# Patient Record
Sex: MALE | Race: WHITE | Employment: FULL TIME | URBAN - METROPOLITAN AREA
[De-identification: names, ages, dates, MRNs, and addresses within clinical notes are randomized per-mention and may not be internally consistent; named-entity substitution may affect disease eponyms.]

---

## 2017-02-02 ENCOUNTER — OFFICE VISIT (OUTPATIENT)
Dept: FAMILY MEDICINE | Facility: CLINIC | Age: 67
End: 2017-02-02
Payer: COMMERCIAL

## 2017-02-02 ENCOUNTER — HOSPITAL ENCOUNTER (OUTPATIENT)
Dept: GENERAL RADIOLOGY | Facility: CLINIC | Age: 67
Discharge: HOME | End: 2017-02-02
Attending: NURSE PRACTITIONER | Admitting: NURSE PRACTITIONER
Payer: COMMERCIAL

## 2017-02-02 VITALS
HEIGHT: 71 IN | WEIGHT: 181 LBS | HEART RATE: 80 BPM | SYSTOLIC BLOOD PRESSURE: 130 MMHG | BODY MASS INDEX: 25.34 KG/M2 | TEMPERATURE: 97.2 F | DIASTOLIC BLOOD PRESSURE: 76 MMHG

## 2017-02-02 DIAGNOSIS — S90.31XA CONTUSION OF FOOT, RIGHT: ICD-10-CM

## 2017-02-02 DIAGNOSIS — S99.921A FOOT INJURY, RIGHT, INITIAL ENCOUNTER: Primary | ICD-10-CM

## 2017-02-02 DIAGNOSIS — S99.921A FOOT INJURY, RIGHT, INITIAL ENCOUNTER: ICD-10-CM

## 2017-02-02 PROCEDURE — 73630 X-RAY EXAM OF FOOT: CPT | Mod: TC

## 2017-02-02 PROCEDURE — 99213 OFFICE O/P EST LOW 20 MIN: CPT | Performed by: NURSE PRACTITIONER

## 2017-02-02 NOTE — PROGRESS NOTES
"  SUBJECTIVE:                                                    Dion López is a 66 year old male who presents to clinic today for the following health issues:      Joint Pain     Onset: 1 week ago     Description:   Location: right foot  Character: Dull ache    Intensity: moderate    Progression of Symptoms: same    Accompanying Signs & Symptoms:  Other symptoms: radiation of pain to right knee, swelling and discoloration of foot, up leg   History:   Previous similar pain: no       Precipitating factors:   Trauma or overuse: YES- stubbed foot at home walking into a wall    Alleviating factors:  Improved : soaking       Therapies Tried and outcome: soaking      The patient is a 66-year-old male seen in clinic today with an injury to the right foot. Last week he got up during the middle of the night and stubbed the toes of the right foot into the wall. He states he jammed it very hard, actually felt pain radiate all the way up the front of his leg. He now has a sense of numbness and tightness in the calf of the leg, and numbness over the dorsum of the foot into the toes. Minimal pain.    Problem list and histories reviewed & adjusted, as indicated.  Additional history: as documented    BP Readings from Last 3 Encounters:   02/02/17 130/76   10/14/16 114/72   04/07/16 128/80    Wt Readings from Last 3 Encounters:   02/02/17 181 lb (82.101 kg)   10/14/16 188 lb (85.276 kg)   04/07/16 184 lb (83.462 kg)                    ROS:  Constitutional, HEENT, cardiovascular, pulmonary, gi and gu systems are negative, except as otherwise noted.    OBJECTIVE:                                                    /76 mmHg  Pulse 80  Temp(Src) 97.2  F (36.2  C) (Tympanic)  Ht 5' 11\" (1.803 m)  Wt 181 lb (82.101 kg)  BMI 25.26 kg/m2  Body mass index is 25.26 kg/(m^2).  GENERAL: healthy, alert and no distress  MS: Focused exam of right lower extremity: There is no edema. Pedal and posttibial pulses are easily palpated. " Diffuse ecchymosis across the second and third toes, the dorsum of the forefoot, the lateral aspect of the foot, and around the lateral malleolus. He is able to move all toes, there is still mild swelling of the second and third toes. His first toe is deformed appearing area and states this is due to an injury as a young child when it was severed and sewed back on. He has decreased sensation to tactile stimuli across the dorsum of the foot and around the ankle. No pain to palpation of the calf, no redness or obvious swelling.    X-ray appears negative for acute bony injury. Formal reading pending      ASSESSMENT/PLAN:                                                      Problem List Items Addressed This Visit        Medium    Foot injury, right, initial encounter - Primary    Relevant Orders    XR Foot Right G/E 3 Views      Other Visit Diagnoses     Contusion of foot, right              Warm water soaks for 10-15 minutes twice daily to improve circulation, help reabsorb the ecchymosis.    Avoid prolonged standing or walking for the next 2-3 days    Follow-up in clinic if still experiencing significant numbness in the foot    BP Screening:   Last 3 BP Readings:    BP Readings from Last 3 Encounters:   02/02/17 130/76   10/14/16 114/72   04/07/16 128/80       The following was recommended to the patient:  Re-screen BP within a year and recommended lifestyle modifications          DONNA Lezama [facility]

## 2017-02-02 NOTE — Clinical Note
[address] [address]  Phone: [phone]  Fax: [phone]    February 2, 2017        Dion López  [address] [address]          To whom it may concern:    RE: Dion López    Patient was seen and treated today at our clinic.  Patient may return to work 2/6/17 with the following:  No restrictions    Please contact me for questions or concerns.      Sincerely,        DONNA Lezama CNP

## 2017-02-02 NOTE — NURSING NOTE
"Chief Complaint   Patient presents with     Musculoskeletal Problem       Initial /76 mmHg  Pulse 80  Temp(Src) 97.2  F (36.2  C) (Tympanic)  Ht 5' 11\" (1.803 m)  Wt 181 lb (82.101 kg)  BMI 25.26 kg/m2 Estimated body mass index is 25.26 kg/(m^2) as calculated from the following:    Height as of this encounter: 5' 11\" (1.803 m).    Weight as of this encounter: 181 lb (82.101 kg).  BP completed using cuff size: regular  "

## 2017-04-13 ENCOUNTER — DOCUMENTATION ONLY (OUTPATIENT)
Dept: VASCULAR SURGERY | Facility: CLINIC | Age: 67
End: 2017-04-13

## 2017-04-13 DIAGNOSIS — Z13.6 ENCOUNTER FOR ABDOMINAL AORTIC ANEURYSM (AAA) SCREENING: Primary | ICD-10-CM

## 2017-09-01 ENCOUNTER — HOSPITAL ENCOUNTER (EMERGENCY)
Facility: CLINIC | Age: 67
Discharge: HOME | End: 2017-09-01
Attending: NURSE PRACTITIONER | Admitting: NURSE PRACTITIONER
Payer: COMMERCIAL

## 2017-09-01 ENCOUNTER — APPOINTMENT (OUTPATIENT)
Dept: GENERAL RADIOLOGY | Facility: CLINIC | Age: 67
End: 2017-09-01
Attending: NURSE PRACTITIONER
Payer: COMMERCIAL

## 2017-09-01 ENCOUNTER — TELEPHONE (OUTPATIENT)
Dept: FAMILY MEDICINE | Facility: CLINIC | Age: 67
End: 2017-09-01

## 2017-09-01 VITALS
BODY MASS INDEX: 25.1 KG/M2 | OXYGEN SATURATION: 96 % | HEART RATE: 63 BPM | TEMPERATURE: 97.7 F | SYSTOLIC BLOOD PRESSURE: 135 MMHG | DIASTOLIC BLOOD PRESSURE: 82 MMHG | RESPIRATION RATE: 14 BRPM | WEIGHT: 180 LBS

## 2017-09-01 DIAGNOSIS — R06.02 SHORTNESS OF BREATH: ICD-10-CM

## 2017-09-01 DIAGNOSIS — F41.9 ANXIETY: ICD-10-CM

## 2017-09-01 DIAGNOSIS — R07.9 CHEST PAIN, UNSPECIFIED: ICD-10-CM

## 2017-09-01 LAB
ALBUMIN SERPL-MCNC: 3.6 G/DL (ref 3.4–5)
ALP SERPL-CCNC: 104 U/L (ref 40–150)
ALT SERPL W P-5'-P-CCNC: 44 U/L (ref 0–70)
ANION GAP SERPL CALCULATED.3IONS-SCNC: 13 MMOL/L (ref 3–14)
AST SERPL W P-5'-P-CCNC: 32 U/L (ref 0–45)
BASOPHILS # BLD AUTO: 0 10E9/L (ref 0–0.2)
BASOPHILS NFR BLD AUTO: 0.4 %
BILIRUB SERPL-MCNC: 0.5 MG/DL (ref 0.2–1.3)
BUN SERPL-MCNC: 27 MG/DL (ref 7–30)
CALCIUM SERPL-MCNC: 8.5 MG/DL (ref 8.5–10.1)
CHLORIDE SERPL-SCNC: 107 MMOL/L (ref 94–109)
CO2 SERPL-SCNC: 24 MMOL/L (ref 20–32)
CREAT SERPL-MCNC: 1.05 MG/DL (ref 0.66–1.25)
DIFFERENTIAL METHOD BLD: NORMAL
EOSINOPHIL # BLD AUTO: 0.2 10E9/L (ref 0–0.7)
EOSINOPHIL NFR BLD AUTO: 2.3 %
ERYTHROCYTE [DISTWIDTH] IN BLOOD BY AUTOMATED COUNT: 13.4 % (ref 10–15)
GFR SERPL CREATININE-BSD FRML MDRD: 70 ML/MIN/1.7M2
GLUCOSE SERPL-MCNC: 106 MG/DL (ref 70–99)
HCT VFR BLD AUTO: 40.8 % (ref 40–53)
HGB BLD-MCNC: 13.9 G/DL (ref 13.3–17.7)
IMM GRANULOCYTES # BLD: 0 10E9/L (ref 0–0.4)
IMM GRANULOCYTES NFR BLD: 0.1 %
LYMPHOCYTES # BLD AUTO: 2.1 10E9/L (ref 0.8–5.3)
LYMPHOCYTES NFR BLD AUTO: 30 %
MCH RBC QN AUTO: 30.5 PG (ref 26.5–33)
MCHC RBC AUTO-ENTMCNC: 34.1 G/DL (ref 31.5–36.5)
MCV RBC AUTO: 90 FL (ref 78–100)
MONOCYTES # BLD AUTO: 0.6 10E9/L (ref 0–1.3)
MONOCYTES NFR BLD AUTO: 8.2 %
NEUTROPHILS # BLD AUTO: 4.2 10E9/L (ref 1.6–8.3)
NEUTROPHILS NFR BLD AUTO: 59 %
PLATELET # BLD AUTO: 227 10E9/L (ref 150–450)
POTASSIUM SERPL-SCNC: 3.8 MMOL/L (ref 3.4–5.3)
PROT SERPL-MCNC: 6.7 G/DL (ref 6.8–8.8)
RBC # BLD AUTO: 4.55 10E12/L (ref 4.4–5.9)
SODIUM SERPL-SCNC: 144 MMOL/L (ref 133–144)
TROPONIN I SERPL-MCNC: <0.015 UG/L (ref 0–0.04)
TSH SERPL DL<=0.005 MIU/L-ACNC: 1.7 MU/L (ref 0.4–4)
WBC # BLD AUTO: 7 10E9/L (ref 4–11)

## 2017-09-01 PROCEDURE — 84443 ASSAY THYROID STIM HORMONE: CPT | Performed by: NURSE PRACTITIONER

## 2017-09-01 PROCEDURE — 85025 COMPLETE CBC W/AUTO DIFF WBC: CPT | Performed by: NURSE PRACTITIONER

## 2017-09-01 PROCEDURE — 71020 XR CHEST 2 VW: CPT | Mod: TC

## 2017-09-01 PROCEDURE — 93005 ELECTROCARDIOGRAM TRACING: CPT | Performed by: NURSE PRACTITIONER

## 2017-09-01 PROCEDURE — 99285 EMERGENCY DEPT VISIT HI MDM: CPT | Mod: 25 | Performed by: NURSE PRACTITIONER

## 2017-09-01 PROCEDURE — 93010 ELECTROCARDIOGRAM REPORT: CPT | Mod: Z6 | Performed by: NURSE PRACTITIONER

## 2017-09-01 PROCEDURE — 84484 ASSAY OF TROPONIN QUANT: CPT | Performed by: NURSE PRACTITIONER

## 2017-09-01 PROCEDURE — 80053 COMPREHEN METABOLIC PANEL: CPT | Performed by: NURSE PRACTITIONER

## 2017-09-01 RX ORDER — LIDOCAINE 40 MG/G
CREAM TOPICAL
Status: DISCONTINUED | OUTPATIENT
Start: 2017-09-01 | End: 2017-09-01 | Stop reason: HOSPADM

## 2017-09-01 RX ORDER — ASPIRIN 81 MG/1
324 TABLET, CHEWABLE ORAL ONCE
Status: DISCONTINUED | OUTPATIENT
Start: 2017-09-01 | End: 2017-09-01 | Stop reason: HOSPADM

## 2017-09-01 ASSESSMENT — ENCOUNTER SYMPTOMS
NERVOUS/ANXIOUS: 1
CHEST TIGHTNESS: 1
MYALGIAS: 1

## 2017-09-01 NOTE — DISCHARGE INSTRUCTIONS

## 2017-09-01 NOTE — ED AVS SNAPSHOT
[facility] Emergency Department    [address] [place] [address]    Phone:  [phone]    Fax:  [phone]                                       Dion López   MRN: [id number]    Department:  [facility] Emergency Department   Date of Visit:  9/1/2017           Patient Information     Date Of Birth          1950        Your diagnoses for this visit were:     Anxiety        You were seen by Jolanta Cheema APRN CNP.      Follow-up Information     Follow up with Sharri Bonner MD.    Specialty:  Family Practice    Why:  As scheduled    Contact information:    [address] [place] [address] [phone]          Follow up with [facility] Emergency Department.    Specialty:  EMERGENCY MEDICINE    Why:  If symptoms worsen    Contact information:    [address] [place] [address] [phone]    Additional information:    From [address]: Exit at [address] on [place] side of [place]. Turn right on [address]. Turn left at stoplight on [address]. [facility] will be in view two blocks ahead        Discharge Instructions         Anxiety Reaction  Anxiety is the feeling we all get when we think something bad might happen. It is a normal response to stress and usually causes only a mild reaction. When anxiety becomes more severe, it can interfere with daily life. In some cases, you may not even be aware of what it is you re anxious about. There may also be a genetic link or it may be a learned behavior in the home.  Both psychological and physical triggers cause stress reaction. It's often a response to fear or emotional stress, real or imagined. This stress may come from home, family, work, or social relationships.  During an anxiety reaction, you may feel:    Helpless    Nervous    Depressed    Irritable  Your body may show signs of anxiety in many ways. You may experience:    Dry  mouth    Shakiness    Dizziness    Weakness    Trouble breathing    Breathing fast (hyperventilating)    Chest pressure    Sweating    Headache    Nausea    Diarrhea    Tiredness    Inability to sleep    Sexual problems  Home care    Try to locate the sources of stress in your life. They may not be obvious. These may include:    Daily hassles of life (traffic jams, missed appointments, car troubles, etc.)    Major life changes, both good (new baby, job promotion) and bad (loss of job, loss of loved one)    Overload: feeling that you have too many responsibilities and can't take care of all of them at once    Feeling helpless, feeling that your problems are beyond what you re able to solve    Notice how your body reacts to stress. Learn to listen to your body signals. This will help you take action before the stress becomes severe.    When you can, do something about the source of your stress. (Avoid hassles, limit the amount of change that happens in your life at one time and take a break when you feel overloaded).    Unfortunately, many stressful situations can't be avoided. It is necessary to learn how to better manage stress. There are many proven methods that will reduce your anxiety. These include simple things like exercise, good nutrition and adequate rest. Also, there are certain techniques that are helpful:    Relaxation    Breathing exercises    Visualization    Biofeedback    Meditation  For more information about this, consult your doctor or go to a local bookstore and review the many books and tapes available on this subject.  Follow-up care  If you feel that your anxiety is not responding to self-help measures, contact your doctor or make an appointment with a counselor. You may need short-term psychological counseling and temporary medicine to help you manage stress.  Call 911  Call your healthcare provider right away if any of these occur:    Trouble breathing    Confusion    Drowsiness or trouble  wakening    Fainting or loss of consciousness    Rapid heart rate    Seizure    New chest pain that becomes more severe, lasts longer, or spreads into your shoulder, arm, neck, jaw, or back  When to seek medical advice  Call your healthcare provider right away if any of these occur:    Your symptoms get worse    Severe headache not relieved by rest and mild pain reliever  Date Last Reviewed: 9/29/2015 2000-2017 [employer]. [address] [place] [address], [place] [address]. All rights reserved. This information is not intended as a substitute for professional medical care. Always follow your healthcare professional's instructions.          24 Hour Appointment Hotline       To make an appointment at any [facility], call [phone] ([phone]). If you don't have a family doctor or clinic, we will help you find one. [facility] are conveniently located to serve the needs of you and your family.             Review of your medicines      Our records show that you are taking the medicines listed below. If these are incorrect, please call your family doctor or clinic.        Dose / Directions Last dose taken    cinnamon 500 MG Tabs        Refills:  0        NAPROXEN PO   Dose:  250 mg        Take 250 mg by mouth 2 times daily (with meals)   Refills:  0        omega 3 1000 MG Caps   Dose:  1 g   Quantity:  90 capsule        Take 1 g by mouth daily   Refills:  0        RANITIDINE HCL PO   Dose:  150 mg        Take 150 mg by mouth daily   Refills:  0                Procedures and tests performed during your visit     CBC with platelets differential    Comprehensive metabolic panel    EKG 12 lead    Peripheral IV catheter    TSH with free T4 reflex    Troponin I    XR Chest 2 Views      Orders Needing Specimen Collection     None      Pending Results     No orders found from 8/30/2017 to 9/2/2017.            Pending Culture Results     No orders found from 8/30/2017 to 9/2/2017.            Pending  "Results Instructions     If you had any lab results that were not finalized at the time of your Discharge, you can call the ED Lab Result RN at [phone]. You will be contacted by this team for any positive Lab results or changes in treatment. The nurses are available 7 days a week from 10A to 6:30P.  You can leave a message 24 hours per day and they will return your call.        Thank you for choosing [facility]       Thank you for choosing [facility] for your care. Our goal is always to provide you with excellent care. Hearing back from our patients is one way we can continue to improve our services. Please take a few minutes to complete the written survey that you may receive in the mail after you visit with us. Thank you!        [employer] Information     [employer] lets you send messages to your doctor, view your test results, renew your prescriptions, schedule appointments and more. To sign up, go to [email or web address] . Click on \"Log in\" on the left side of the screen, which will take you to the Welcome page. Then click on \"Sign up Now\" on the right side of the page.     You will be asked to enter the access code listed below, as well as some personal information. Please follow the directions to create your username and password.     Your access code is: [email or web address]  Expires: 2017  4:30 PM     Your access code will  in 90 days. If you need help or a new code, please call your [facility] clinic or [phone].        Care EveryWhere ID     This is your Care EveryWhere ID. This could be used by other organizations to access your [facility] medical records  [id number]        Equal Access to Services     [facility]: Hadii anjel Landrum, waaxda luqadaha, qaybta kaaldesi gutier[address]. So [facility] [phone].    ATENCIÓN: Si habla español, tiene a garcia disposición servicios gratuitos de asistencia lingüística. Llame al [phone].    We comply with " applicable federal civil rights laws and [place] laws. We do not discriminate on the basis of race, color, national origin, age, disability sex, sexual orientation or gender identity.            After Visit Summary       This is your record. Keep this with you and show to your community pharmacist(s) and doctor(s) at your next visit.

## 2017-09-01 NOTE — ED AVS SNAPSHOT
[facility] Emergency Department    [address] [place] [address]    Phone:  [phone]    Fax:  [phone]                                       Dion López   MRN: [id number]    Department:  [facility] Emergency Department   Date of Visit:  9/1/2017           After Visit Summary Signature Page     I have received my discharge instructions, and my questions have been answered. I have discussed any challenges I see with this plan with the nurse or doctor.    ..........................................................................................................................................  Patient/Patient Representative Signature      ..........................................................................................................................................  Patient Representative Print Name and Relationship to Patient    ..................................................               ................................................  Date                                            Time    ..........................................................................................................................................  Reviewed by Signature/Title    ...................................................              ..............................................  Date                                                            Time

## 2017-09-01 NOTE — TELEPHONE ENCOUNTER
"Patient is calling to report he has been experiencing chest pain/tightness for a while now off and on.  Sometimes he reports it is very painful and feels like a bear is hugging him.  He states the pain will radiate around to his back in the shoulder blade area.  He is experiencing numbness in his toes.  He states when he has the pain, he does also have breathing problems but, \"I just work through it\".  He states he has had high BP's in the past and does not know the last time he had is cholesterol checked.  He states he is very fatigued, but cannot tell if this is due to the 50+ hours he has been working per week, or from this.      Patient is informed he will need to get to the ED as soon as possible.  He states he needs to take a shower and eat something first.  It is explained to him that he may be having intermittent slight heart attacks and needs to be seen ASAP.  He states he will be fine and that he is not experiencing any symptoms currently.  He is informed he will need to have someone drive him in case he has symptoms while on the road, but he is insistent that he is fine and driving will not be a problem.  He states he can pull over if he experiences any symptoms.  He again is informed he should have someone drive him to the ED now, but he states he is hungry and will get there as soon as he can.    Closing this encounter.  Luli Stockton RN      "

## 2017-09-01 NOTE — ED NOTES
Pt who is generally well taking naproxen and zantac PRn states that for some wks he has had intermittent left sided chest pain that goes into his shoulder blades.  He has had a complete cardiac workup some years ago with holter and w/o cardiac dx.  Today is the 3rd Anniversary of his wife's death from cancer. NAD. Denies any SOB/n/v/f/c.

## 2017-09-01 NOTE — ED PROVIDER NOTES
"  History     Chief Complaint   Patient presents with     Chest Pain     tighness     The history is provided by the patient.     Dion López is a 66 year old male who presents to the emergency department today with complaints of chest pain that radiates to his back and bilateral shoulder pain that has been occurring on and off for the last 2 weeks.  Patient reports that his symptoms usually start while he is at work and feels stressed to meet a deadline.  Patient denies any known heart issues, he has not seen a primary care provider in years.  Patient is not on any medication for blood pressure.  Patient takes multivitamins and Zantac daily, he is on naproxen as needed.  Patient currently denies any symptoms.    I have reviewed the Medications, Allergies, Past Medical and Surgical History, and Social History in the Epic system.    Allergies: No Known Allergies      No current facility-administered medications on file prior to encounter.   Current Outpatient Prescriptions on File Prior to Encounter:  omega 3 1000 MG CAPS Take 1 g by mouth daily   cinnamon 500 MG TABS    RANITIDINE HCL PO Take 150 mg by mouth daily   NAPROXEN PO Take 250 mg by mouth 2 times daily (with meals)       Patient Active Problem List   Diagnosis     Counseling regarding advanced directives     Foot injury, right, initial encounter       No past surgical history on file.    Social History   Substance Use Topics     Smoking status: Never Smoker     Smokeless tobacco: Never Used     Alcohol use No         There is no immunization history on file for this patient.    BMI: Estimated body mass index is 25.1 kg/(m^2) as calculated from the following:    Height as of 2/2/17: 1.803 m (5' 11\").    Weight as of this encounter: 81.6 kg (180 lb).      Review of Systems   Respiratory: Positive for chest tightness.    Musculoskeletal: Positive for myalgias.   Psychiatric/Behavioral: The patient is nervous/anxious.    All other systems reviewed and are " negative.      Physical Exam   BP: (!) 132/111  Pulse: 63  Heart Rate: 57  Temp: 97.7  F (36.5  C)  Resp: 18  Weight: 81.6 kg (180 lb)  SpO2: 95 %  Physical Exam   Constitutional: He is oriented to person, place, and time. He appears well-developed and well-nourished. No distress.   HENT:   Head: Normocephalic.   Mouth/Throat: Oropharynx is clear and moist.   Eyes: Conjunctivae are normal.   Neck: Normal range of motion. Neck supple.   Cardiovascular: Normal rate and regular rhythm.    Pulmonary/Chest: Effort normal and breath sounds normal. He exhibits no tenderness.   Abdominal: Soft. Bowel sounds are normal.   Musculoskeletal: Normal range of motion.   Neurological: He is alert and oriented to person, place, and time.   Skin: Skin is warm and dry. He is not diaphoretic.   Psychiatric: He has a normal mood and affect.       ED Course     ED Course     Procedures          EKG Interpretation:      Interpreted by Jolanta Cheema  Time reviewed:1440   Symptoms at time of EKG: None   Rhythm: Normal sinus   Rate: Mild Bradycardia at 58  Axis: Right Axis Deviation  Ectopy: None  Conduction: Normal and Left anterior fasciclar block  ST Segments/ T Waves: No ST-T wave changes and No acute ischemic changes  Q Waves: None  Comparison to prior: No old EKG available  Clinical Impression: no acute changes    Results for orders placed or performed during the hospital encounter of 09/01/17   XR Chest 2 Views    Narrative    CHEST TWO VIEWS  9/1/2017 4:00 PM     HISTORY: Chest pain.     COMPARISON: Chest x-ray 10/13/2016.      Impression    IMPRESSION: PA and lateral views of the chest. Lungs are clear. Heart  is normal in size. No effusions are evident. No pneumothorax. No  interval change.    VINNY KUMAR MD   CBC with platelets differential   Result Value Ref Range    WBC 7.0 4.0 - 11.0 10e9/L    RBC Count 4.55 4.4 - 5.9 10e12/L    Hemoglobin 13.9 13.3 - 17.7 g/dL    Hematocrit 40.8 40.0 - 53.0 %    MCV 90 78 - 100 fl    MCH  30.5 26.5 - 33.0 pg    MCHC 34.1 31.5 - 36.5 g/dL    RDW 13.4 10.0 - 15.0 %    Platelet Count 227 150 - 450 10e9/L    Diff Method Automated Method     % Neutrophils 59.0 %    % Lymphocytes 30.0 %    % Monocytes 8.2 %    % Eosinophils 2.3 %    % Basophils 0.4 %    % Immature Granulocytes 0.1 %    Absolute Neutrophil 4.2 1.6 - 8.3 10e9/L    Absolute Lymphocytes 2.1 0.8 - 5.3 10e9/L    Absolute Monocytes 0.6 0.0 - 1.3 10e9/L    Absolute Eosinophils 0.2 0.0 - 0.7 10e9/L    Absolute Basophils 0.0 0.0 - 0.2 10e9/L    Abs Immature Granulocytes 0.0 0 - 0.4 10e9/L   Comprehensive metabolic panel   Result Value Ref Range    Sodium 144 133 - 144 mmol/L    Potassium 3.8 3.4 - 5.3 mmol/L    Chloride 107 94 - 109 mmol/L    Carbon Dioxide 24 20 - 32 mmol/L    Anion Gap 13 3 - 14 mmol/L    Glucose 106 (H) 70 - 99 mg/dL    Urea Nitrogen 27 7 - 30 mg/dL    Creatinine 1.05 0.66 - 1.25 mg/dL    GFR Estimate 70 >60 mL/min/1.7m2    GFR Estimate If Black 85 >60 mL/min/1.7m2    Calcium 8.5 8.5 - 10.1 mg/dL    Bilirubin Total 0.5 0.2 - 1.3 mg/dL    Albumin 3.6 3.4 - 5.0 g/dL    Protein Total 6.7 (L) 6.8 - 8.8 g/dL    Alkaline Phosphatase 104 40 - 150 U/L    ALT 44 0 - 70 U/L    AST 32 0 - 45 U/L   Troponin I   Result Value Ref Range    Troponin I ES <0.015 0.000 - 0.045 ug/L   TSH with free T4 reflex   Result Value Ref Range    TSH 1.70 0.40 - 4.00 mU/L       Labs Ordered and Resulted from Time of ED Arrival Up to the Time of Departure from the ED - No data to display    Assessments & Plan (with Medical Decision Making)  Dion is an otherwise healthy male who presents to the emergency department today with complaints of chest pain that radiates to his back and bilateral shoulder pain with mild shortness of breath that happens on and off for the last 2 weeks typically when patient is feeling stressed to meet a deadline at work.  Patient's symptoms certainly sound consistent with anxiety.  Cardiac etiology will be ruled out today, EKG was  done and is as noted above with no acute ischemic findings.  Patient's blood pressure is stable here, he arrives here asymptomatic.  Blood work was obtained including a troponin and TSH, CBC, and CMP all of which are unremarkable.  Chest x-ray was obtained as well which is negative for any acute findings.  I discussed the findings of today's exam and test results with patient.  I did discuss with patient that I felt his symptoms were largely anxiety related.  Patient has not seen his primary care provider for years, he is overdue for a colonoscopy and general physical including a recent cholesterol test.  I encouraged these with the patient, a follow-up appointment was made with Dr. Bonner in clinic on the 14th.  Patient can discuss his symptoms that brought him to the emergency room today at that time.  Reasons to return to the emergency department were discussed with patient in detail, he is agreeable to plan of care and was discharged in stable condition.       I have reviewed the nursing notes.    I have reviewed the findings, diagnosis, plan and need for follow up with the patient.    Discharge Medication List as of 9/1/2017  4:30 PM          Final diagnoses:   Anxiety       9/1/2017   [facility] EMERGENCY DEPARTMENT     Jolanta Cheema, DONNA CNP  09/01/17 1954

## 2017-09-05 NOTE — TELEPHONE ENCOUNTER
Tried to reach patient, left message for patient to call the clinic back.  Wendy Bourgeois, [facility]

## 2017-09-14 ENCOUNTER — OFFICE VISIT (OUTPATIENT)
Dept: FAMILY MEDICINE | Facility: CLINIC | Age: 67
End: 2017-09-14
Payer: COMMERCIAL

## 2017-09-14 VITALS
TEMPERATURE: 97.2 F | HEART RATE: 68 BPM | BODY MASS INDEX: 25.66 KG/M2 | OXYGEN SATURATION: 95 % | DIASTOLIC BLOOD PRESSURE: 78 MMHG | WEIGHT: 184 LBS | RESPIRATION RATE: 16 BRPM | SYSTOLIC BLOOD PRESSURE: 136 MMHG

## 2017-09-14 DIAGNOSIS — R07.89 ATYPICAL CHEST PAIN: Primary | ICD-10-CM

## 2017-09-14 DIAGNOSIS — I10 BENIGN ESSENTIAL HYPERTENSION: ICD-10-CM

## 2017-09-14 LAB
ALBUMIN SERPL-MCNC: 3.7 G/DL (ref 3.4–5)
ALP SERPL-CCNC: 105 U/L (ref 40–150)
ALT SERPL W P-5'-P-CCNC: 43 U/L (ref 0–70)
ANION GAP SERPL CALCULATED.3IONS-SCNC: 9 MMOL/L (ref 3–14)
AST SERPL W P-5'-P-CCNC: 31 U/L (ref 0–45)
BASOPHILS # BLD AUTO: 0 10E9/L (ref 0–0.2)
BASOPHILS NFR BLD AUTO: 0.5 %
BILIRUB SERPL-MCNC: 0.4 MG/DL (ref 0.2–1.3)
BUN SERPL-MCNC: 27 MG/DL (ref 7–30)
CALCIUM SERPL-MCNC: 8.7 MG/DL (ref 8.5–10.1)
CHLORIDE SERPL-SCNC: 111 MMOL/L (ref 94–109)
CHOLEST SERPL-MCNC: 177 MG/DL
CO2 SERPL-SCNC: 26 MMOL/L (ref 20–32)
CREAT SERPL-MCNC: 0.86 MG/DL (ref 0.66–1.25)
D DIMER PPP FEU-MCNC: 0.3 UG/ML FEU (ref 0–0.5)
DIFFERENTIAL METHOD BLD: NORMAL
EOSINOPHIL # BLD AUTO: 0.2 10E9/L (ref 0–0.7)
EOSINOPHIL NFR BLD AUTO: 2.7 %
ERYTHROCYTE [DISTWIDTH] IN BLOOD BY AUTOMATED COUNT: 13.7 % (ref 10–15)
GFR SERPL CREATININE-BSD FRML MDRD: 89 ML/MIN/1.7M2
GLUCOSE SERPL-MCNC: 99 MG/DL (ref 70–99)
HCT VFR BLD AUTO: 40.9 % (ref 40–53)
HDLC SERPL-MCNC: 55 MG/DL
HGB BLD-MCNC: 14.1 G/DL (ref 13.3–17.7)
IMM GRANULOCYTES # BLD: 0 10E9/L (ref 0–0.4)
IMM GRANULOCYTES NFR BLD: 0.2 %
LDLC SERPL CALC-MCNC: 98 MG/DL
LYMPHOCYTES # BLD AUTO: 3.1 10E9/L (ref 0.8–5.3)
LYMPHOCYTES NFR BLD AUTO: 36.3 %
MCH RBC QN AUTO: 30.9 PG (ref 26.5–33)
MCHC RBC AUTO-ENTMCNC: 34.5 G/DL (ref 31.5–36.5)
MCV RBC AUTO: 90 FL (ref 78–100)
MONOCYTES # BLD AUTO: 0.7 10E9/L (ref 0–1.3)
MONOCYTES NFR BLD AUTO: 8.2 %
NEUTROPHILS # BLD AUTO: 4.4 10E9/L (ref 1.6–8.3)
NEUTROPHILS NFR BLD AUTO: 52.1 %
NONHDLC SERPL-MCNC: 122 MG/DL
PLATELET # BLD AUTO: 211 10E9/L (ref 150–450)
POTASSIUM SERPL-SCNC: 4 MMOL/L (ref 3.4–5.3)
PROT SERPL-MCNC: 6.6 G/DL (ref 6.8–8.8)
RBC # BLD AUTO: 4.56 10E12/L (ref 4.4–5.9)
SODIUM SERPL-SCNC: 146 MMOL/L (ref 133–144)
TRIGL SERPL-MCNC: 121 MG/DL
TROPONIN I SERPL-MCNC: <0.015 UG/L (ref 0–0.04)
WBC # BLD AUTO: 8.4 10E9/L (ref 4–11)

## 2017-09-14 PROCEDURE — 93000 ELECTROCARDIOGRAM COMPLETE: CPT | Performed by: FAMILY MEDICINE

## 2017-09-14 PROCEDURE — 80053 COMPREHEN METABOLIC PANEL: CPT | Performed by: FAMILY MEDICINE

## 2017-09-14 PROCEDURE — 36415 COLL VENOUS BLD VENIPUNCTURE: CPT | Performed by: FAMILY MEDICINE

## 2017-09-14 PROCEDURE — 80061 LIPID PANEL: CPT | Performed by: FAMILY MEDICINE

## 2017-09-14 PROCEDURE — 85379 FIBRIN DEGRADATION QUANT: CPT | Performed by: FAMILY MEDICINE

## 2017-09-14 PROCEDURE — 85025 COMPLETE CBC W/AUTO DIFF WBC: CPT | Performed by: FAMILY MEDICINE

## 2017-09-14 PROCEDURE — 84484 ASSAY OF TROPONIN QUANT: CPT | Performed by: FAMILY MEDICINE

## 2017-09-14 PROCEDURE — 99215 OFFICE O/P EST HI 40 MIN: CPT | Performed by: FAMILY MEDICINE

## 2017-09-14 RX ORDER — LISINOPRIL 5 MG/1
5 TABLET ORAL DAILY
Qty: 30 TABLET | Refills: 1 | Status: SHIPPED | OUTPATIENT
Start: 2017-09-14 | End: 2017-10-31

## 2017-09-14 RX ORDER — CALCIUM CARBONATE 500(1250)
1 TABLET ORAL DAILY
COMMUNITY

## 2017-09-14 RX ORDER — NITROGLYCERIN 0.4 MG/1
TABLET SUBLINGUAL
Qty: 25 TABLET | Refills: 0 | Status: SHIPPED | OUTPATIENT
Start: 2017-09-14

## 2017-09-14 ASSESSMENT — PAIN SCALES - GENERAL: PAINLEVEL: NO PAIN (0)

## 2017-09-14 NOTE — MR AVS SNAPSHOT
"              After Visit Summary   2017    Dion López    MRN: [id number]           Patient Information     Date Of Birth          1950        Visit Information        Provider Department      2017 2:40 PM Sharri Bonner MD [facility]        Today's Diagnoses     Atypical chest pain    -  1       Follow-ups after your visit        Future tests that were ordered for you today     Open Future Orders        Priority Expected Expires Ordered    NM Exercise stress test Routine  2018            Who to contact     If you have questions or need follow up information about today's clinic visit or your schedule please contact [facility] directly at [phone].  Normal or non-critical lab and imaging results will be communicated to you by MyChart, letter or phone within 4 business days after the clinic has received the results. If you do not hear from us within 7 days, please contact the clinic through MyChart or phone. If you have a critical or abnormal lab result, we will notify you by phone as soon as possible.  Submit refill requests through [employer] or call your pharmacy and they will forward the refill request to us. Please allow 3 business days for your refill to be completed.          Additional Information About Your Visit        MyChart Information     [employer] lets you send messages to your doctor, view your test results, renew your prescriptions, schedule appointments and more. To sign up, go to [email or web address] . Click on \"Log in\" on the left side of the screen, which will take you to the Welcome page. Then click on \"Sign up Now\" on the right side of the page.     You will be asked to enter the access code listed below, as well as some personal information. Please follow the directions to create your username and password.     Your access code is: [email or web address]  Expires: 2017  4:30 PM     Your access code will  in 90 days. If you need " help or a new code, please call your [facility] clinic or [phone].        Care EveryWhere ID     This is your Care EveryWhere ID. This could be used by other organizations to access your [facility] medical records  [id number]        Your Vitals Were     Pulse Temperature Respirations Pulse Oximetry BMI (Body Mass Index)       68 97.2  F (36.2  C) (Temporal) 16 95% 25.66 kg/m2        Blood Pressure from Last 3 Encounters:   09/14/17 (P) 150/72   09/01/17 135/82   02/02/17 130/76    Weight from Last 3 Encounters:   09/14/17 184 lb (83.5 kg)   09/01/17 180 lb (81.6 kg)   02/02/17 181 lb (82.1 kg)              We Performed the Following     CBC with platelets and differential     Comprehensive metabolic panel (BMP + Alb, Alk Phos, ALT, AST, Total. Bili, TP)     D dimer, quantitative     EKG 12-lead complete w/read - Clinics     Lipid panel reflex to direct LDL     Troponin I          Today's Medication Changes          These changes are accurate as of: 9/14/17  3:31 PM.  If you have any questions, ask your nurse or doctor.               Start taking these medicines.        Dose/Directions    aspirin 81 MG tablet   Used for:  Atypical chest pain   Started by:  Sharri Bonner MD        Dose:  81 mg   Take 1 tablet (81 mg) by mouth daily   Quantity:  30 tablet   Refills:  0            Where to get your medicines      Some of these will need a paper prescription and others can be bought over the counter.  Ask your nurse if you have questions.     You don't need a prescription for these medications     aspirin 81 MG tablet                Primary Care Provider Office Phone #    [facility] [phone]       No address on file        Equal Access to Services     MARGARETH ROCHA AH: Killian Landrum, boy arteaga, desi jerome. So [facility] [phone].    ATENCIÓN: Si habla español, tiene a garcia disposición servicios gratuitos de asistencia  lingüística. Ayana al [phone].    We comply with applicable federal civil rights laws and [place] laws. We do not discriminate on the basis of race, color, national origin, age, disability sex, sexual orientation or gender identity.            Thank you!     Thank you for choosing [facility]  for your care. Our goal is always to provide you with excellent care. Hearing back from our patients is one way we can continue to improve our services. Please take a few minutes to complete the written survey that you may receive in the mail after your visit with us. Thank you!             Your Updated Medication List - Protect others around you: Learn how to safely use, store and throw away your medicines at www.disposemymeds.org.          This list is accurate as of: 9/14/17  3:31 PM.  Always use your most recent med list.                   Brand Name Dispense Instructions for use Diagnosis    aspirin 81 MG tablet     30 tablet    Take 1 tablet (81 mg) by mouth daily    Atypical chest pain       calcium carbonate 1250 MG tablet    OS-QUIANA 500 mg [place]. [place]     Take 1 tablet by mouth daily        cinnamon 500 MG Tabs           IRON SUPPLEMENT PO      Take 325 mg by mouth daily        MAGNESIUM OXIDE PO      Take 200 mg by mouth daily        MULTIPLE VITAMIN PO      Take 1 tablet by mouth daily        NAPROXEN PO      Take 250 mg by mouth 2 times daily (with meals)        omega 3 1000 MG Caps     90 capsule    Take 1 g by mouth daily        PROBIOTIC DAILY PO      Take 1 tablet by mouth daily        RANITIDINE HCL PO      Take 150 mg by mouth daily        vitamin b complex w/vitamin C Tabs tablet      Take 1 tablet by mouth daily        VITAMIN C PO      Take 1,000 mg by mouth daily

## 2017-09-14 NOTE — PROGRESS NOTES
"  SUBJECTIVE:   Dion López is a 67 year old male who presents to clinic today for the following health issues:    ED/UC Followup:    Facility:  [facility]  Date of visit: 9/1/17  Reason for visit: Chest Pain - Anxiety  Current Status: Lillie Ashley is here today for ER follow up.  Was seen in the ER on 9/1/17 for chest pain that he has had for 2 weeks. The \"pain was triggered by stress or worked hard\". Stated that the pain was pressured that radiates to his back and shoulders bilaterally.  Never had it before. No unusual activities, but has been working logn hours.  Work in the ER with normal CXR, EKG, CBC, CMP, TSH and troponin.  He was diagnosed with anxiety and recommended to follow up in 2 weeks.    Stated that continue to have the chest pain on and off since discharged from the ER with the last episode was this morning.  Happens at least once a day.  Usually triggered by \"work hard, pushing and lifting\". Been having it on and off in the last 3 months and overall it is about the same. Never had this kind of pain before.  Pain is \"achy\" as \"muscle pain\". Pain radiates to both shoulders, back and right neck.  No associated SOB or diaphoresis.  Pain goes away soon with resting.  Denies of excessive stress in his life.  No hx of depression or anxiety.  Does not feel anxious.  Denies of unintended weight loss.  No leg swelling, orthopnea or dyspnea.    Has not seen a health care provider for years. Never been diagnosed with DM or HTN. Has not had cholesterol checked for years. Quitted smoking in 1978.  Drinks alcohol rarely and does not do drug. Does exercise on a regular basis.  Family hx is significant for CAD to his mother at the age of 55.      Problem list and histories reviewed & adjusted, as indicated.  Additional history: as documented    Patient Active Problem List   Diagnosis     Counseling regarding advanced directives     Foot injury, right, initial encounter     Past " Surgical History:   Procedure Laterality Date     AMPUTATION FINGER/THUMB       NO HISTORY OF SURGERY         Social History   Substance Use Topics     Smoking status: Former Smoker     Quit date: 9/14/1978     Smokeless tobacco: Never Used     Alcohol use No     Family History   Problem Relation Age of Onset     Coronary Artery Disease Mother 55     Hypertension Mother      Other Cancer Father      lung cancer     Unknown/Adopted Maternal Grandmother      Unknown/Adopted Maternal Grandfather      Unknown/Adopted Paternal Grandmother      Unknown/Adopted Paternal Grandfather      Substance Abuse Brother      alcoholism     Other Cancer Sister      throat cancer     Seizure Disorder Brother      Hypertension Sister          Current Outpatient Prescriptions   Medication Sig Dispense Refill     MULTIPLE VITAMIN PO Take 1 tablet by mouth daily       Probiotic Product (PROBIOTIC DAILY PO) Take 1 tablet by mouth daily       calcium carbonate (OS-QUIANA 500 MG [place]. [place]) 1250 MG tablet Take 1 tablet by mouth daily       MAGNESIUM OXIDE PO Take 200 mg by mouth daily       Ferrous Sulfate (IRON SUPPLEMENT PO) Take 325 mg by mouth daily       B Complex-C (VITAMIN B COMPLEX W/VITAMIN C) TABS tablet Take 1 tablet by mouth daily       Ascorbic Acid (VITAMIN C PO) Take 1,000 mg by mouth daily       aspirin 81 MG tablet Take 1 tablet (81 mg) by mouth daily 30 tablet      omega 3 1000 MG CAPS Take 1 g by mouth daily 90 capsule      cinnamon 500 MG TABS        RANITIDINE HCL PO Take 150 mg by mouth daily       NAPROXEN PO Take 250 mg by mouth 2 times daily (with meals)       No Known Allergies      Reviewed and updated as needed this visit by clinical staffTobacco  Allergies  Meds  Soc Hx      Reviewed and updated as needed this visit by Provider         ROS:  Constitutional, HEENT, cardiovascular, pulmonary, GI, , musculoskeletal, neuro, skin, endocrine and psych systems are negative, except as otherwise noted.      OBJECTIVE:    BP (P) 150/72  Pulse 68  Temp 97.2  F (36.2  C) (Temporal)  Resp 16  Wt 184 lb (83.5 kg)  SpO2 95%  BMI 25.66 kg/m2  Body mass index is 25.66 kg/(m^2).  GENERAL: healthy, alert and no distress.  Speak in full sentences  EYES: Eyes grossly normal to inspection, PERRL and conjunctivae and sclerae normal  HENT: ear canals and TM's normal.  Nares are non-congested. Oropharynx is pink and moist. No tender with palpation to the sinuses.  NECK: no adenopathy, supple and thyroid normal to palpation.  No JV distension or carotid bruits.  RESP: lungs clear to auscultation - no rales, rhonchi or wheezes. Good respiratory effort through out.  CV: regular rate and rhythm, no murmur, click or rub.  Strong pedal pulses bilaterally. No tender with palpation to the chest.  ABDOMEN: soft, non-distended, non-tender and bowel sounds normal. No tenderness with palpation to the epigastric area.  MS: no gross musculoskeletal defects noted, no edema. No focal weakness.  Shoulder exams were normal.  NEURO: Normal strength and tone, mentation intact and speech normal.  Cranial nerve 2-12 are intact.  DTR +2 through out. No focal neurological deficit.  PSYCH: mentation appears normal, affect normal/bright.    Diagnostic Test Results:  No results found for this or any previous visit (from the past 24 hour(s)).  Results for orders placed or performed in visit on 09/14/17   CBC with platelets and differential   Result Value Ref Range    WBC 8.4 4.0 - 11.0 10e9/L    RBC Count 4.56 4.4 - 5.9 10e12/L    Hemoglobin 14.1 13.3 - 17.7 g/dL    Hematocrit 40.9 40.0 - 53.0 %    MCV 90 78 - 100 fl    MCH 30.9 26.5 - 33.0 pg    MCHC 34.5 31.5 - 36.5 g/dL    RDW 13.7 10.0 - 15.0 %    Platelet Count 211 150 - 450 10e9/L    Diff Method Automated Method     % Neutrophils 52.1 %    % Lymphocytes 36.3 %    % Monocytes 8.2 %    % Eosinophils 2.7 %    % Basophils 0.5 %    % Immature Granulocytes 0.2 %    Absolute Neutrophil 4.4 1.6 - 8.3 10e9/L    Absolute  Lymphocytes 3.1 0.8 - 5.3 10e9/L    Absolute Monocytes 0.7 0.0 - 1.3 10e9/L    Absolute Eosinophils 0.2 0.0 - 0.7 10e9/L    Absolute Basophils 0.0 0.0 - 0.2 10e9/L    Abs Immature Granulocytes 0.0 0 - 0.4 10e9/L   Comprehensive metabolic panel (BMP + Alb, Alk Phos, ALT, AST, Total. Bili, TP)   Result Value Ref Range    Sodium 146 (H) 133 - 144 mmol/L    Potassium 4.0 3.4 - 5.3 mmol/L    Chloride 111 (H) 94 - 109 mmol/L    Carbon Dioxide 26 20 - 32 mmol/L    Anion Gap 9 3 - 14 mmol/L    Glucose 99 70 - 99 mg/dL    Urea Nitrogen 27 7 - 30 mg/dL    Creatinine 0.86 0.66 - 1.25 mg/dL    GFR Estimate 89 >60 mL/min/1.7m2    GFR Estimate If Black >90 >60 mL/min/1.7m2    Calcium 8.7 8.5 - 10.1 mg/dL    Bilirubin Total 0.4 0.2 - 1.3 mg/dL    Albumin 3.7 3.4 - 5.0 g/dL    Protein Total 6.6 (L) 6.8 - 8.8 g/dL    Alkaline Phosphatase 105 40 - 150 U/L    ALT 43 0 - 70 U/L    AST 31 0 - 45 U/L   Troponin I   Result Value Ref Range    Troponin I ES <0.015 0.000 - 0.045 ug/L   D dimer, quantitative   Result Value Ref Range    D Dimer 0.3 0.0 - 0.50 ug/ml FEU   Lipid panel reflex to direct LDL   Result Value Ref Range    Cholesterol 177 <200 mg/dL    Triglycerides 121 <150 mg/dL    HDL Cholesterol 55 >39 mg/dL    LDL Cholesterol Calculated 98 <100 mg/dL    Non HDL Cholesterol 122 <130 mg/dL     CXR on 9/1/17 was normal.    EKG today showed: NSR with Q-wave on II, III, AFV - inferior ischemia undetermined age.  No ST depression or elevation.    ASSESSMENT/PLAN:     1. Atypical chest pain  His symptoms are suggestive if angina that was induced by exertion.  Unlikely it is anxiety induced chest pain. Risk factor include first degree family hx of CAD to his mother at 55.  He does not smoke, drink alcohol or using drug.  Discussed with him about he nature of the condition.  Need nuclear stress test asap and it is scheduled for early next week as it is the earliest spot available at our hospital.  Offer to get it done at a different  facility but he declined. Labs as ordered. He was instructed to take it easy until after the stress test. Start him on Aspirin 81 mg daily and NTG as needed for chest pain.  He needs to return to the ER if has symptoms again especially if develops associated SOB, dyspnea or diaphoresis if has any concern.  He understand the repeated the instruction.    - CBC with platelets and differential  - Comprehensive metabolic panel (BMP + Alb, Alk Phos, ALT, AST, Total. Bili, TP)  - Troponin I  - EKG 12-lead complete w/read - Clinics  - D dimer, quantitative  - NM Exercise stress test; Future  - aspirin 81 MG tablet; Take 1 tablet (81 mg) by mouth daily  Dispense: 30 tablet  - Lipid panel reflex to direct LDL  - nitroGLYcerin (NITROSTAT) 0.4 MG sublingual tablet; For chest pain place 1 tablet under the tongue every 5 minutes for 3 doses. If symptoms persist 5 minutes after 1st dose call 911.  Dispense: 25 tablet; Refill: 0    2. Benign essential hypertension    New diagnoses and his BP is high today. He has never been on any medication for this.  Discussed with him about the nature of the condition and emphasize the importance of having it under controlled. Educate him about the long and short term consequences of uncontrolled HTN as well as the goal for his blood pressure.  Will restart him on low dose Lisinopril and side effects discussed.  Encouraged low salt diet.  Take easy for now until the nuclear stress test result is available.  Follow up in 1 month, earlier as needed.    - lisinopril (PRINIVIL/ZESTRIL) 5 MG tablet; Take 1 tablet (5 mg) by mouth daily  Dispense: 30 tablet; Refill: 1      Sharri Maya Mai, MD  [facility]

## 2017-09-15 ENCOUNTER — TELEPHONE (OUTPATIENT)
Dept: FAMILY MEDICINE | Facility: CLINIC | Age: 67
End: 2017-09-15

## 2017-09-15 ENCOUNTER — HOSPITAL ENCOUNTER (EMERGENCY)
Facility: CLINIC | Age: 67
Discharge: HOME | End: 2017-09-16
Attending: FAMILY MEDICINE | Admitting: FAMILY MEDICINE
Payer: COMMERCIAL

## 2017-09-15 DIAGNOSIS — I49.8 VENTRICULAR TRIGEMINY: ICD-10-CM

## 2017-09-15 DIAGNOSIS — R53.1 GENERALIZED WEAKNESS: ICD-10-CM

## 2017-09-15 DIAGNOSIS — I49.3 PVC'S (PREMATURE VENTRICULAR CONTRACTIONS): ICD-10-CM

## 2017-09-15 DIAGNOSIS — Z87.891 PERSONAL HISTORY OF SMOKING: ICD-10-CM

## 2017-09-15 PROCEDURE — 93010 ELECTROCARDIOGRAM REPORT: CPT | Mod: Z6 | Performed by: FAMILY MEDICINE

## 2017-09-15 PROCEDURE — 93005 ELECTROCARDIOGRAM TRACING: CPT | Performed by: FAMILY MEDICINE

## 2017-09-15 PROCEDURE — 99285 EMERGENCY DEPT VISIT HI MDM: CPT | Mod: 25 | Performed by: FAMILY MEDICINE

## 2017-09-15 PROCEDURE — 99285 EMERGENCY DEPT VISIT HI MDM: CPT | Mod: Z6 | Performed by: FAMILY MEDICINE

## 2017-09-15 NOTE — TELEPHONE ENCOUNTER
Tried to reach patient, left message for patient to call the clinic back.  Wendy Bourgeois, [facility]

## 2017-09-15 NOTE — TELEPHONE ENCOUNTER
----- Message from Sharri Maya Mai, MD sent at 9/15/2017  6:56 AM CDT -----  Please let patient know that his labs showed his kidney function test, liver function tests and electrolytes were normal.  His cardiac enzyme called troponins was negative. His cholesterol looks really good with normal total cholesterol, triglyceride, good cholesterol and bad cholesterol.  His d-dimer level was normal which suggests that this is unlikely that he had a blood clots in his lungs or legs.  His CBC was also normal, no anemia. I recommend to get a nuclear stress test as scheduled.

## 2017-09-15 NOTE — ED AVS SNAPSHOT
[facility] Emergency Department    [address] [place] [address]    Phone:  [phone]    Fax:  [phone]                                       Dion López   MRN: [id number]    Department:  [facility] Emergency Department   Date of Visit:  9/15/2017           After Visit Summary Signature Page     I have received my discharge instructions, and my questions have been answered. I have discussed any challenges I see with this plan with the nurse or doctor.    ..........................................................................................................................................  Patient/Patient Representative Signature      ..........................................................................................................................................  Patient Representative Print Name and Relationship to Patient    ..................................................               ................................................  Date                                            Time    ..........................................................................................................................................  Reviewed by Signature/Title    ...................................................              ..............................................  Date                                                            Time

## 2017-09-15 NOTE — ED AVS SNAPSHOT
[facility] Emergency Department    [address] [place] [address]    Phone:  [phone]    Fax:  [phone]                                       Dion Lópze   MRN: [id number]    Department:  [facility] Emergency Department   Date of Visit:  9/15/2017           Patient Information     Date Of Birth          1950        Your diagnoses for this visit were:     Generalized weakness     PVC's (premature ventricular contractions)     Ventricular trigeminy intermittent       You were seen by Israel Kidd MD.      Follow-up Information     Follow up with Sharri Bonner MD.    Specialty:  Family Practice    Contact information:    [facility] [address] [facility]   [place] [address] [phone]          Discharge Instructions       Go home and rest.  Sedentary activity over the weekend.  Keep your cardiac stress test appointment on Tuesday as scheduled.  Please return to the ED if you worsen or have any concerns.  It was a pleasure visiting with you this evening.  I'm glad you are feeling better and hope you continue to improve.    Thank you for choosing [facility]. We appreciate the opportunity to meet your urgent medical needs. Please let us know if we could have done anything to make your stay more satisfying.    After discharge, please closely monitor for any new or worsening symptoms. Return to the Emergency Department if you develop any acute worsening signs or symptoms.    If you had lab work, cultures or imaging studies done during your stay, the final results may still be pending. We will call you if your plan of care needs to change. However, if you are not improving as expected, please follow up with your primary care provider or clinic.     Start any prescription medications that were prescribed to you and take them as directed.     Please see additional handouts that may be pertinent to your condition.        Future Appointments        Provider Department  Dept Phone Center    9/19/2017 8:30 AM Stress TestPrince[facility]; [facility] MED ROOM 1 [facility] Nuclear Medicine [phone] [facility]      24 Hour Appointment Hotline       To make an appointment at any [place] clinic, call [phone] ([phone]). If you don't have a family doctor or clinic, we will help you find one. [place] clinics are conveniently located to serve the needs of you and your family.             Review of your medicines      Our records show that you are taking the medicines listed below. If these are incorrect, please call your family doctor or clinic.        Dose / Directions Last dose taken    aspirin 81 MG tablet   Dose:  81 mg   Quantity:  30 tablet        Take 1 tablet (81 mg) by mouth daily   Refills:  0        calcium carbonate 1250 MG tablet   Commonly known as:  OS-QUIANA 500 mg [place]. Ca   Dose:  1 tablet        Take 1 tablet by mouth daily   Refills:  0        cinnamon 500 MG Tabs        Refills:  0        IRON SUPPLEMENT PO   Dose:  325 mg        Take 325 mg by mouth daily   Refills:  0        lisinopril 5 MG tablet   Commonly known as:  PRINIVIL/ZESTRIL   Dose:  5 mg   Quantity:  30 tablet        Take 1 tablet (5 mg) by mouth daily   Refills:  1        MAGNESIUM OXIDE PO   Dose:  200 mg        Take 200 mg by mouth daily   Refills:  0        MULTIPLE VITAMIN PO   Dose:  1 tablet        Take 1 tablet by mouth daily   Refills:  0        NAPROXEN PO   Dose:  250 mg        Take 250 mg by mouth 2 times daily (with meals)   Refills:  0        nitroGLYcerin 0.4 MG sublingual tablet   Commonly known as:  NITROSTAT   Quantity:  25 tablet        For chest pain place 1 tablet under the tongue every 5 minutes for 3 doses. If symptoms persist 5 minutes after 1st dose call 911.   Refills:  0        omega 3 1000 MG Caps   Dose:  1 g   Quantity:  90 capsule        Take 1 g by mouth daily   Refills:  0        PROBIOTIC DAILY PO   Dose:  1 tablet        Take 1 tablet by  mouth daily   Refills:  0        RANITIDINE HCL PO   Dose:  150 mg        Take 150 mg by mouth daily   Refills:  0        vitamin b complex w/vitamin C Tabs tablet   Dose:  1 tablet        Take 1 tablet by mouth daily   Refills:  0        VITAMIN C PO   Dose:  1000 mg        Take 1,000 mg by mouth daily   Refills:  0                Procedures and tests performed during your visit     Procedure/Test Number of Times Performed    Blood gas venous 1    CBC with platelets differential 1    Cardiac Continuous Monitoring 1    Comprehensive metabolic panel 1    D dimer quantitative 1    EKG 12-lead, tracing only 2    Magnesium 1    Nt probnp inpatient (BNP) 1    Peripheral IV: Standard 1    Pulse oximetry nursing 1    Troponin I 2    XR Chest 2 Views 1      Orders Needing Specimen Collection     None      Pending Results     Date and Time Order Name Status Description    9/16/2017 0014 XR Chest 2 Views Preliminary             Pending Culture Results     No orders found for last 3 day(s).            Pending Results Instructions     If you had any lab results that were not finalized at the time of your Discharge, you can call the ED Lab Result RN at [phone]. You will be contacted by this team for any positive Lab results or changes in treatment. The nurses are available 7 days a week from 10A to 6:30P.  You can leave a message 24 hours per day and they will return your call.        Thank you for choosing [facility]       Thank you for choosing [facility] for your care. Our goal is always to provide you with excellent care. Hearing back from our patients is one way we can continue to improve our services. Please take a few minutes to complete the written survey that you may receive in the mail after you visit with us. Thank you!        Search[employer] Information     [employer] lets you send messages to your doctor, view your test results, renew your prescriptions, schedule appointments and more. To sign up, go to  [email or web address] . Click on \"Log in\" on the left side of the screen, which will take you to the Welcome page. Then click on \"Sign up Now\" on the right side of the page.     You will be asked to enter the access code listed below, as well as some personal information. Please follow the directions to create your username and password.     Your access code is: [email or web address]  Expires: 2017  4:30 PM     Your access code will  in 90 days. If you need help or a new code, please call your [place] clinic or [phone].        Care EveryWhere ID     This is your Care EveryWhere ID. This could be used by other organizations to access your [place] medical records  [id number]        Equal Access to Services     SAIRA ROCHA : Killian Landrum, boy arteaga, virgie morgan, desi alexander. So [facility] [phone].    ATENCIÓN: Si habla español, tiene a garcia disposición servicios gratuitos de asistencia lingüística. Llkaden al [phone].    We comply with applicable federal civil rights laws and [place] laws. We do not discriminate on the basis of race, color, national origin, age, disability sex, sexual orientation or gender identity.            After Visit Summary       This is your record. Keep this with you and show to your community pharmacist(s) and doctor(s) at your next visit.                  "

## 2017-09-15 NOTE — LETTER
[address] [address]  [phone]        September 18, 2017    Dion López  [address] [facility] [address]          Dear Dion,    Your labs showed his kidney function test, liver function tests and electrolytes were normal.  His cardiac enzyme called troponins was negative. His cholesterol looks really good with normal total cholesterol, triglyceride, good cholesterol and bad cholesterol.  His d-dimer level was normal which suggests that this is unlikely that he had a blood clots in his lungs or legs.  His CBC was also normal, no anemia. I recommend to get a nuclear stress test as scheduled.       Sincerely,        Sharri Bonner M.D. /Renée MENCHACA

## 2017-09-15 NOTE — TELEPHONE ENCOUNTER
Patient returned call, results per Dr Bonner were relayed to patient, no further questions at this time.  Patient requesting results be mailed to him as well with explanations.  Thank you,  Christina Dimas  Patient Representative

## 2017-09-16 ENCOUNTER — APPOINTMENT (OUTPATIENT)
Dept: GENERAL RADIOLOGY | Facility: CLINIC | Age: 67
End: 2017-09-16
Attending: FAMILY MEDICINE
Payer: COMMERCIAL

## 2017-09-16 VITALS
OXYGEN SATURATION: 95 % | DIASTOLIC BLOOD PRESSURE: 85 MMHG | RESPIRATION RATE: 18 BRPM | HEART RATE: 65 BPM | SYSTOLIC BLOOD PRESSURE: 128 MMHG | TEMPERATURE: 97.3 F

## 2017-09-16 LAB
ALBUMIN SERPL-MCNC: 3.3 G/DL (ref 3.4–5)
ALP SERPL-CCNC: 95 U/L (ref 40–150)
ALT SERPL W P-5'-P-CCNC: 40 U/L (ref 0–70)
ANION GAP SERPL CALCULATED.3IONS-SCNC: 14 MMOL/L (ref 3–14)
AST SERPL W P-5'-P-CCNC: 29 U/L (ref 0–45)
BASE DEFICIT BLDV-SCNC: 0.2 MMOL/L
BASOPHILS # BLD AUTO: 0.1 10E9/L (ref 0–0.2)
BASOPHILS NFR BLD AUTO: 0.7 %
BILIRUB SERPL-MCNC: 0.5 MG/DL (ref 0.2–1.3)
BUN SERPL-MCNC: 30 MG/DL (ref 7–30)
CALCIUM SERPL-MCNC: 8.3 MG/DL (ref 8.5–10.1)
CHLORIDE SERPL-SCNC: 110 MMOL/L (ref 94–109)
CO2 SERPL-SCNC: 24 MMOL/L (ref 20–32)
CREAT SERPL-MCNC: 0.92 MG/DL (ref 0.66–1.25)
D DIMER PPP FEU-MCNC: 0.3 UG/ML FEU (ref 0–0.5)
DIFFERENTIAL METHOD BLD: ABNORMAL
EOSINOPHIL # BLD AUTO: 0.2 10E9/L (ref 0–0.7)
EOSINOPHIL NFR BLD AUTO: 3 %
ERYTHROCYTE [DISTWIDTH] IN BLOOD BY AUTOMATED COUNT: 13.8 % (ref 10–15)
GFR SERPL CREATININE-BSD FRML MDRD: 82 ML/MIN/1.7M2
GLUCOSE SERPL-MCNC: 137 MG/DL (ref 70–99)
HCO3 BLDV-SCNC: 25 MMOL/L (ref 21–28)
HCT VFR BLD AUTO: 39.2 % (ref 40–53)
HGB BLD-MCNC: 13.4 G/DL (ref 13.3–17.7)
IMM GRANULOCYTES # BLD: 0 10E9/L (ref 0–0.4)
IMM GRANULOCYTES NFR BLD: 0.3 %
LYMPHOCYTES # BLD AUTO: 2 10E9/L (ref 0.8–5.3)
LYMPHOCYTES NFR BLD AUTO: 25.9 %
MAGNESIUM SERPL-MCNC: 2.1 MG/DL (ref 1.6–2.3)
MCH RBC QN AUTO: 30.9 PG (ref 26.5–33)
MCHC RBC AUTO-ENTMCNC: 34.2 G/DL (ref 31.5–36.5)
MCV RBC AUTO: 90 FL (ref 78–100)
MONOCYTES # BLD AUTO: 0.5 10E9/L (ref 0–1.3)
MONOCYTES NFR BLD AUTO: 7 %
NEUTROPHILS # BLD AUTO: 4.8 10E9/L (ref 1.6–8.3)
NEUTROPHILS NFR BLD AUTO: 63.1 %
NT-PROBNP SERPL-MCNC: 45 PG/ML (ref 0–900)
O2/TOTAL GAS SETTING VFR VENT: 21 %
PCO2 BLDV: 40 MM HG (ref 40–50)
PH BLDV: 7.4 PH (ref 7.32–7.43)
PLATELET # BLD AUTO: 213 10E9/L (ref 150–450)
PO2 BLDV: 61 MM HG (ref 25–47)
POTASSIUM SERPL-SCNC: 3.4 MMOL/L (ref 3.4–5.3)
PROT SERPL-MCNC: 6.3 G/DL (ref 6.8–8.8)
RBC # BLD AUTO: 4.34 10E12/L (ref 4.4–5.9)
SODIUM SERPL-SCNC: 148 MMOL/L (ref 133–144)
TROPONIN I SERPL-MCNC: <0.015 UG/L (ref 0–0.04)
TROPONIN I SERPL-MCNC: <0.015 UG/L (ref 0–0.04)
WBC # BLD AUTO: 7.6 10E9/L (ref 4–11)

## 2017-09-16 PROCEDURE — 84484 ASSAY OF TROPONIN QUANT: CPT | Performed by: FAMILY MEDICINE

## 2017-09-16 PROCEDURE — 83880 ASSAY OF NATRIURETIC PEPTIDE: CPT | Performed by: FAMILY MEDICINE

## 2017-09-16 PROCEDURE — 36415 COLL VENOUS BLD VENIPUNCTURE: CPT | Performed by: FAMILY MEDICINE

## 2017-09-16 PROCEDURE — 83735 ASSAY OF MAGNESIUM: CPT | Performed by: FAMILY MEDICINE

## 2017-09-16 PROCEDURE — 85379 FIBRIN DEGRADATION QUANT: CPT | Performed by: FAMILY MEDICINE

## 2017-09-16 PROCEDURE — 25000132 ZZH RX MED GY IP 250 OP 250 PS 637: Performed by: FAMILY MEDICINE

## 2017-09-16 PROCEDURE — 82803 BLOOD GASES ANY COMBINATION: CPT | Performed by: FAMILY MEDICINE

## 2017-09-16 PROCEDURE — 71020 XR CHEST 2 VW: CPT | Mod: TC

## 2017-09-16 PROCEDURE — 80053 COMPREHEN METABOLIC PANEL: CPT | Performed by: FAMILY MEDICINE

## 2017-09-16 PROCEDURE — 85025 COMPLETE CBC W/AUTO DIFF WBC: CPT | Performed by: FAMILY MEDICINE

## 2017-09-16 RX ORDER — ASPIRIN 81 MG/1
324 TABLET, CHEWABLE ORAL ONCE
Status: COMPLETED | OUTPATIENT
Start: 2017-09-16 | End: 2017-09-16

## 2017-09-16 RX ADMIN — ASPIRIN 81 MG 324 MG: 81 TABLET ORAL at 00:30

## 2017-09-16 NOTE — ED PROVIDER NOTES
History     Chief Complaint   Patient presents with     Generalized Weakness     HPI  Dion López is a 67 year old male who presents to the ED with generalized weakness.  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  He had to lay down on the couch for 75 minutes and was just too weak to even get up.  He finally regain some strength, finished his work and then presents to the ED.  He felt like his heart was pounding very hard.  He denies any chest pain or pressure.  Breathing has improved still feels a bit weak but not as profound as earlier.    He was seen in the ED just last week and had a normal EKG and troponin.  He followed up in clinic 2 days ago and had normal cholesterol, d-dimer.  He is scheduled for a nuclear cardiac stress test early this next week.    Cardiac risk factors: No diabetes, hypertension is being treated with lisinopril, former smoker,?  Family history, recent cholesterol was normal.    Past Medical History:   Diagnosis Date     NO ACTIVE PROBLEMS        Past Surgical History:   Procedure Laterality Date     AMPUTATION FINGER/THUMB       NO HISTORY OF SURGERY         Social History     Social History     Marital status:      Spouse name: N/A     Number of children: N/A     Years of education: N/A     Occupational History     Not on file.     Social History Main Topics     Smoking status: Former Smoker     Quit date: 9/14/1978     Smokeless tobacco: Never Used     Alcohol use No     Drug use: No     Sexual activity: Not Currently      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     Other Topics Concern     Not on file     Social History Narrative        No Known Allergies    Med List Reviewed       I have reviewed the Medications, Allergies, Past Medical and Surgical History, and Social History in the Epic system.    Allergies: No Known Allergies    Review of Systems   All other  systems reviewed and are negative.      Physical Exam   BP: (!) 150/91  Pulse: 65  Temp: 97.3  F (36.3  C)  Resp: 20  SpO2: 97 %  Physical Exam   Constitutional: He is oriented to person, place, and time. He appears well-developed and well-nourished. No distress.   HENT:   Head: Normocephalic.   Mouth/Throat: Oropharynx is clear and moist.   Eyes: EOM are normal.   Neck: Neck supple.   Cardiovascular: Normal rate, regular rhythm and intact distal pulses.    No murmur heard.  Pulmonary/Chest: Effort normal and breath sounds normal. No respiratory distress.   Abdominal: Soft. Bowel sounds are normal. There is no tenderness.   Musculoskeletal: Normal range of motion. He exhibits no edema or tenderness.   Neurological: He is alert and oriented to person, place, and time. No cranial nerve deficit.   Skin: Skin is warm and dry. No pallor.   Psychiatric: He has a normal mood and affect.       ED Course    EKG shows ventricular trigeminy .  He does have 1 mm of ST elevation in V1 and 1.5 mm of ST elevation in V2 is slightly increased from his EKG from just a couple of days ago.  Again he denies any chest pain.      IV placed.  Labs drawn.  Chest x-ray ordered.  Aspirin given.      Initial troponin was undetectable.  The rest of his labs are unremarkable.  Chest x-ray was negative.      We will check a delta two-hour troponin to be certain that it remains undetectable, > four hours after the onset of his sxs.  Overall, he is feeling improved.  He was maintained on a cardiac monitor and his PVCs did settle down.  He would have an occasional unifocal PVC and then occasional bigeminy for a few beats but then go back to normal sinus rhythm.  He was having no chest pain during any of this time.    His 2nd troponin was also undetectable.  He was steady on his feet and he feels ready to go home.  I offered to keep him on observation but he kindly declined.  He does have a nuclear stress test scheduled for early this next week.          ED Course     Procedures             EKG Interpretation:      Interpreted by Israel Kidd  Time reviewed: 0142  Symptoms at time of EKG: mild weakness   Rhythm: normal sinus   Rate: normal  Axis: normal  Ectopy: Trigeminal unifocal PVCs  Conduction: normal  ST Segments/ T Waves: 1 mm of ST elevation in V1 and 1.5 mm of ST elevation in V2 is slightly increased from his EKG from a couple of days ago.  No acute ischemic changes.  Q Waves: none  Comparison to prior: Other than the ventricular trigeminy, no changes from the EKG from 09/14/2017.    Clinical Impression: Sinus rhythm at 63 bpm.  Frequent unifocal PVCs in a trigeminal pattern.  No acute ischemic changes.            Critical Care time:  none          Results for orders placed or performed during the hospital encounter of 09/15/17 (from the past 24 hour(s))   CBC with platelets differential   Result Value Ref Range    WBC 7.6 4.0 - 11.0 10e9/L    RBC Count 4.34 (L) 4.4 - 5.9 10e12/L    Hemoglobin 13.4 13.3 - 17.7 g/dL    Hematocrit 39.2 (L) 40.0 - 53.0 %    MCV 90 78 - 100 fl    MCH 30.9 26.5 - 33.0 pg    MCHC 34.2 31.5 - 36.5 g/dL    RDW 13.8 10.0 - 15.0 %    Platelet Count 213 150 - 450 10e9/L    Diff Method Automated Method     % Neutrophils 63.1 %    % Lymphocytes 25.9 %    % Monocytes 7.0 %    % Eosinophils 3.0 %    % Basophils 0.7 %    % Immature Granulocytes 0.3 %    Absolute Neutrophil 4.8 1.6 - 8.3 10e9/L    Absolute Lymphocytes 2.0 0.8 - 5.3 10e9/L    Absolute Monocytes 0.5 0.0 - 1.3 10e9/L    Absolute Eosinophils 0.2 0.0 - 0.7 10e9/L    Absolute Basophils 0.1 0.0 - 0.2 10e9/L    Abs Immature Granulocytes 0.0 0 - 0.4 10e9/L   Troponin I   Result Value Ref Range    Troponin I ES <0.015 0.000 - 0.045 ug/L   Comprehensive metabolic panel   Result Value Ref Range    Sodium 148 (H) 133 - 144 mmol/L    Potassium 3.4 3.4 - 5.3 mmol/L    Chloride 110 (H) 94 - 109 mmol/L    Carbon Dioxide 24 20 - 32 mmol/L    Anion Gap 14 3 - 14 mmol/L     Glucose 137 (H) 70 - 99 mg/dL    Urea Nitrogen 30 7 - 30 mg/dL    Creatinine 0.92 0.66 - 1.25 mg/dL    GFR Estimate 82 >60 mL/min/1.7m2    GFR Estimate If Black >90 >60 mL/min/1.7m2    Calcium 8.3 (L) 8.5 - 10.1 mg/dL    Bilirubin Total 0.5 0.2 - 1.3 mg/dL    Albumin 3.3 (L) 3.4 - 5.0 g/dL    Protein Total 6.3 (L) 6.8 - 8.8 g/dL    Alkaline Phosphatase 95 40 - 150 U/L    ALT 40 0 - 70 U/L    AST 29 0 - 45 U/L   D dimer quantitative   Result Value Ref Range    D Dimer 0.3 0.0 - 0.50 ug/ml FEU   Blood gas venous   Result Value Ref Range    Ph Venous 7.40 7.32 - 7.43 pH    PCO2 Venous 40 40 - 50 mm Hg    PO2 Venous 61 (H) 25 - 47 mm Hg    Bicarbonate Venous 25 21 - 28 mmol/L    Base Deficit Venous 0.2 mmol/L    FIO2 21    Magnesium   Result Value Ref Range    Magnesium 2.1 1.6 - 2.3 mg/dL   Nt probnp inpatient (BNP)   Result Value Ref Range    N-Terminal Pro BNP Inpatient 45 0 - 900 pg/mL   XR Chest 2 Views    Narrative    XR CHEST 2 VW  9/16/2017 12:54 AM      HISTORY: Chest pain.     COMPARISON: 9/1/2017.    FINDINGS: The heart size is normal. The lungs are clear. No  pneumothorax or pleural effusion.      Impression    IMPRESSION: No acute abnormality.   Troponin I   Result Value Ref Range    Troponin I ES <0.015 0.000 - 0.045 ug/L        Assessments & Plan (with Medical Decision Making)    (R53.1) Generalized weakness  Comment: resolved  Plan: EKG showed no acute ischemic changes.  Troponin were negative ×2.  He has a nuclear stress test scheduled for Tuesday of this next week.    (I49.3) PVC's (premature ventricular contractions)  Comment: Now resolved  Plan: I offered to keep him on observation for telemetry monitoring but he kindly declined and wishes to go home.    (I49.8) Ventricular trigeminy  Comment: intermittent  Plan: As above.            I have reviewed the nursing notes.    I have reviewed the findings, diagnosis, plan and need for follow up with the patient.       New Prescriptions    No medications on  file       Final diagnoses:   Generalized weakness   PVC's (premature ventricular contractions)   Ventricular trigeminy - intermittent       9/15/2017   [facility] EMERGENCY DEPARTMENT     Israel Kidd MD  09/16/17 5924

## 2017-09-16 NOTE — DISCHARGE INSTRUCTIONS
Go home and rest.  Sedentary activity over the weekend.  Keep your cardiac stress test appointment on Tuesday as scheduled.  Please return to the ED if you worsen or have any concerns.  It was a pleasure visiting with you this evening.  I'm glad you are feeling better and hope you continue to improve.    Thank you for choosing [facility]. We appreciate the opportunity to meet your urgent medical needs. Please let us know if we could have done anything to make your stay more satisfying.    After discharge, please closely monitor for any new or worsening symptoms. Return to the Emergency Department if you develop any acute worsening signs or symptoms.    If you had lab work, cultures or imaging studies done during your stay, the final results may still be pending. We will call you if your plan of care needs to change. However, if you are not improving as expected, please follow up with your primary care provider or clinic.     Start any prescription medications that were prescribed to you and take them as directed.     Please see additional handouts that may be pertinent to your condition.

## 2017-09-16 NOTE — ED NOTES
Patient walked around ED without issue, states he feels he has full strength, denies C/P, SOB, Dizziness, and Nausea.  Patient states he feels fine and ready to go home.  Patient states he will not probably have a issue until he goes to work and is under stress.

## 2017-09-18 ENCOUNTER — CARE COORDINATION (OUTPATIENT)
Dept: CARE COORDINATION | Facility: CLINIC | Age: 67
End: 2017-09-18

## 2017-09-18 NOTE — LETTER
[place] CARE COORDINATION  [address] [address]  Phone: [phone]      September 20, 2017      Dion López  [address] [facility] [address]    Dear Dion,  I am the Clinic Care Coordinator that works with your primary care provider's clinic. I have been trying to reach you recently to introduce Clinic Care Coordination and to see if there was anything I could assist you with.  Below is a description of what Clinic Care Coordination is and how I can further assist you.     The Clinic Care Coordinator role is a Registered Nurse and/or  who understands the health care system. The goal of Clinic Care Coordination is to help you manage your health and improve access to the [place] system in the most efficient manner.  The Registered Nurse can assist you in meeting your health care goals by providing education, coordinating services, and strengthening the communication among your providers. The  can assist you with financial, behavioral, psychosocial, and chemical dependency and counseling/psychiatric resources.    Please feel free to keep this letter and contact information to contact me at [phone] with any further questions or concerns that may arise. We at [facility] are focused on providing you with the highest-quality healthcare experience possible and that all starts with you.       Sincerely,     DEZ Hernández  Care Navigator  [facility]  [phone]

## 2017-09-19 ENCOUNTER — HOSPITAL ENCOUNTER (OUTPATIENT)
Dept: NUCLEAR MEDICINE | Facility: CLINIC | Age: 67
Setting detail: NUCLEAR MEDICINE
Discharge: HOME | End: 2017-09-19
Attending: FAMILY MEDICINE | Admitting: FAMILY MEDICINE
Payer: COMMERCIAL

## 2017-09-19 DIAGNOSIS — R07.89 ATYPICAL CHEST PAIN: ICD-10-CM

## 2017-09-19 PROCEDURE — 93016 CV STRESS TEST SUPVJ ONLY: CPT | Performed by: INTERNAL MEDICINE

## 2017-09-19 PROCEDURE — 34300033 ZZH RX 343: Performed by: FAMILY MEDICINE

## 2017-09-19 PROCEDURE — 78452 HT MUSCLE IMAGE SPECT MULT: CPT | Mod: 26 | Performed by: INTERNAL MEDICINE

## 2017-09-19 PROCEDURE — 78452 HT MUSCLE IMAGE SPECT MULT: CPT

## 2017-09-19 PROCEDURE — 93017 CV STRESS TEST TRACING ONLY: CPT | Performed by: INTERNAL MEDICINE

## 2017-09-19 PROCEDURE — 93018 CV STRESS TEST I&R ONLY: CPT | Performed by: INTERNAL MEDICINE

## 2017-09-19 PROCEDURE — A9502 TC99M TETROFOSMIN: HCPCS | Performed by: FAMILY MEDICINE

## 2017-09-19 RX ADMIN — TETROFOSMIN 32.5 MCI.: 1.38 INJECTION, POWDER, LYOPHILIZED, FOR SOLUTION INTRAVENOUS at 10:15

## 2017-09-19 RX ADMIN — TETROFOSMIN 10.8 MCI.: 1.38 INJECTION, POWDER, LYOPHILIZED, FOR SOLUTION INTRAVENOUS at 08:30

## 2017-09-19 NOTE — PROGRESS NOTES
Clinic Care Coordination Contact  [facility]/Voicemail    Referral Source: IP/TCU Report  Clinical Data: Care Coordinator Outreach  Outreach attempted x 1.  Left message on voicemail with call back information and requested return call.  Plan: Care Coordinator will try to reach patient again in 1-2 business days.  DEZ Hernández  Care Navigator  [facility]  [phone]      Clinic Care Coordination Contact  [facility]/Voicemail    Referral Source: IP/TCU Report  Clinical Data: Care Coordinator Outreach  Outreach attempted x 2.  Left message on voicemail with call back information and requested return call.  Plan: Care Coordinator will mail out care coordination introduction letter with care coordinator contact information and explanation of care coordination services. Care Coordinator will try to reach patient again in 1-2 business days.  DEZ Hernández  Care Navigator  [facility]  [phone]

## 2017-09-20 ENCOUNTER — TELEPHONE (OUTPATIENT)
Dept: FAMILY MEDICINE | Facility: CLINIC | Age: 67
End: 2017-09-20

## 2017-09-20 DIAGNOSIS — R07.9 CHEST PAIN: ICD-10-CM

## 2017-09-20 DIAGNOSIS — R94.39 ABNORMAL STRESS TEST: Primary | ICD-10-CM

## 2017-09-20 NOTE — TELEPHONE ENCOUNTER
Message left of small abnormality with his stress test. Per provider unclear of its significance. Should follow up with cardiology for further evaluation. Informed we have him scheduled with cardiology on Oct. 10th at 2:00pm. Number left to call if this appointment does not work for him. Any questions feel free to contact the clinic. Informed we will also send out an appointment letter. Yadira Harris LPN

## 2017-09-20 NOTE — TELEPHONE ENCOUNTER
----- Message from Sharri Maya Mai, MD sent at 9/19/2017  7:50 PM CDT -----  Please let patient know that his stress tested small area of abnormality. Unclear its significance. Will refer to cardiology for further evaluation.  Please refer him to cardiology consult for chest pain/angina with abnormal nuclear stress test. henny

## 2017-10-10 ENCOUNTER — OFFICE VISIT (OUTPATIENT)
Dept: CARDIOLOGY | Facility: CLINIC | Age: 67
End: 2017-10-10
Payer: COMMERCIAL

## 2017-10-10 VITALS
OXYGEN SATURATION: 93 % | SYSTOLIC BLOOD PRESSURE: 122 MMHG | WEIGHT: 187.9 LBS | BODY MASS INDEX: 26.31 KG/M2 | HEART RATE: 68 BPM | DIASTOLIC BLOOD PRESSURE: 72 MMHG | HEIGHT: 71 IN

## 2017-10-10 DIAGNOSIS — R94.39 ABNORMAL CARDIOVASCULAR STRESS TEST: Primary | ICD-10-CM

## 2017-10-10 PROCEDURE — 99204 OFFICE O/P NEW MOD 45 MIN: CPT | Performed by: INTERNAL MEDICINE

## 2017-10-10 RX ORDER — METOPROLOL TARTRATE 25 MG/1
12.5 TABLET, FILM COATED ORAL 2 TIMES DAILY
Qty: 45 TABLET | Refills: 3 | Status: ON HOLD | OUTPATIENT
Start: 2017-10-10 | End: 2017-10-20

## 2017-10-10 RX ORDER — ATORVASTATIN CALCIUM 20 MG/1
20 TABLET, FILM COATED ORAL DAILY
Qty: 90 TABLET | Refills: 3 | Status: SHIPPED | OUTPATIENT
Start: 2017-10-10 | End: 2018-11-05

## 2017-10-10 NOTE — PROGRESS NOTES
HPI and Plan:   See dictation(#[id number])    No orders of the defined types were placed in this encounter.      Orders Placed This Encounter   Medications     atorvastatin (LIPITOR) 20 MG tablet     Sig: Take 1 tablet (20 mg) by mouth daily     Dispense:  90 tablet     Refill:  3     metoprolol (LOPRESSOR) 25 MG tablet     Sig: Take 0.5 tablets (12.5 mg) by mouth 2 times daily     Dispense:  45 tablet     Refill:  3       There are no discontinued medications.      Encounter Diagnosis   Name Primary?     Abnormal cardiovascular stress test Yes       CURRENT MEDICATIONS:  Current Outpatient Prescriptions   Medication Sig Dispense Refill     atorvastatin (LIPITOR) 20 MG tablet Take 1 tablet (20 mg) by mouth daily 90 tablet 3     metoprolol (LOPRESSOR) 25 MG tablet Take 0.5 tablets (12.5 mg) by mouth 2 times daily 45 tablet 3     MULTIPLE VITAMIN PO Take 1 tablet by mouth daily       Probiotic Product (PROBIOTIC DAILY PO) Take 1 tablet by mouth daily       calcium carbonate (OS-QUIANA 500 MG [place]. [place]) 1250 MG tablet Take 1 tablet by mouth daily       MAGNESIUM OXIDE PO Take 200 mg by mouth daily       Ferrous Sulfate (IRON SUPPLEMENT PO) Take 325 mg by mouth daily       B Complex-C (VITAMIN B COMPLEX W/VITAMIN C) TABS tablet Take 1 tablet by mouth daily       Ascorbic Acid (VITAMIN C PO) Take 1,000 mg by mouth daily       aspirin 81 MG tablet Take 1 tablet (81 mg) by mouth daily 30 tablet      lisinopril (PRINIVIL/ZESTRIL) 5 MG tablet Take 1 tablet (5 mg) by mouth daily 30 tablet 1     omega 3 1000 MG CAPS Take 1 g by mouth daily 90 capsule      cinnamon 500 MG TABS        RANITIDINE HCL PO Take 150 mg by mouth daily       NAPROXEN PO Take 250 mg by mouth 2 times daily (with meals)       nitroGLYcerin (NITROSTAT) 0.4 MG sublingual tablet For chest pain place 1 tablet under the tongue every 5 minutes for 3 doses. If symptoms persist 5 minutes after 1st dose call 911. (Patient not taking: Reported on 10/10/2017) 25 tablet 0  "      ALLERGIES   No Known Allergies    PAST MEDICAL HISTORY:  Past Medical History:   Diagnosis Date     NO ACTIVE PROBLEMS        PAST SURGICAL HISTORY:  Past Surgical History:   Procedure Laterality Date     AMPUTATION FINGER/THUMB       NO HISTORY OF SURGERY         FAMILY HISTORY:  Family History   Problem Relation Age of Onset     Coronary Artery Disease Mother 55     Hypertension Mother      Other Cancer Father      lung cancer     Unknown/Adopted Maternal Grandmother      Unknown/Adopted Maternal Grandfather      Unknown/Adopted Paternal Grandmother      Unknown/Adopted Paternal Grandfather      Substance Abuse Brother      alcoholism     Other Cancer Sister      throat cancer     Seizure Disorder Brother      Hypertension Sister        SOCIAL HISTORY:  Social History     Social History     Marital status:      Spouse name: N/A     Number of children: N/A     Years of education: N/A     Social History Main Topics     Smoking status: Former Smoker     Quit date: 9/14/1978     Smokeless tobacco: Never Used     Alcohol use No     Drug use: No     Sexual activity: Not Currently      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     Other Topics Concern     Not on file     Social History Narrative       Review of Systems:  Skin:  Negative       Eyes:  Positive for glasses    ENT:  Negative      Respiratory:  Negative       Cardiovascular:  Negative for;palpitations;edema;lightheadedness;dizziness chest pain;Positive for;fatigue chest pain across the chest 3 out of 10 on the pain scale felt like sore muscle, has had same pain before   Gastroenterology:        Genitourinary:  Negative      Musculoskeletal:  Negative      Neurologic:  Negative      Psychiatric:  Negative      Heme/Lymph/Imm:  Negative      Endocrine:  Negative        Physical Exam:  Vitals: /72 (BP Location: Left arm, Patient Position: Fowlers, Cuff Size: Adult Large)  Pulse 68  Ht 1.803 m (5' 11\")  Wt 85.2 kg (187 lb 14.4 oz)  " SpO2 93%  BMI 26.21 kg/m2    Constitutional:           Skin:           Head:           Eyes:           ENT:           Neck:           Chest:             Cardiac:                    Abdomen:           Vascular:                                          Extremities and Back:                 Neurological:                 CC  Sharri Maya Mai, MD  [address] [facility] [place] [address]

## 2017-10-10 NOTE — LETTER
10/10/2017      RE: [facility]pez  [address] [facility] [address]       Dear Colleague,    Thank you for the opportunity to participate in the care of your patient, Dion López, at the [facility]. Please see a copy of my visit note below.    HPI and Plan:   See dictation(#[id number])    No orders of the defined types were placed in this encounter.      Orders Placed This Encounter   Medications     atorvastatin (LIPITOR) 20 MG tablet     Sig: Take 1 tablet (20 mg) by mouth daily     Dispense:  90 tablet     Refill:  3     metoprolol (LOPRESSOR) 25 MG tablet     Sig: Take 0.5 tablets (12.5 mg) by mouth 2 times daily     Dispense:  45 tablet     Refill:  3       There are no discontinued medications.      Encounter Diagnosis   Name Primary?     Abnormal cardiovascular stress test Yes       CURRENT MEDICATIONS:  Current Outpatient Prescriptions   Medication Sig Dispense Refill     atorvastatin (LIPITOR) 20 MG tablet Take 1 tablet (20 mg) by mouth daily 90 tablet 3     metoprolol (LOPRESSOR) 25 MG tablet Take 0.5 tablets (12.5 mg) by mouth 2 times daily 45 tablet 3     MULTIPLE VITAMIN PO Take 1 tablet by mouth daily       Probiotic Product (PROBIOTIC DAILY PO) Take 1 tablet by mouth daily       calcium carbonate (OS-QUIANA 500 MG [place]. [place]) 1250 MG tablet Take 1 tablet by mouth daily       MAGNESIUM OXIDE PO Take 200 mg by mouth daily       Ferrous Sulfate (IRON SUPPLEMENT PO) Take 325 mg by mouth daily       B Complex-C (VITAMIN B COMPLEX W/VITAMIN C) TABS tablet Take 1 tablet by mouth daily       Ascorbic Acid (VITAMIN C PO) Take 1,000 mg by mouth daily       aspirin 81 MG tablet Take 1 tablet (81 mg) by mouth daily 30 tablet      lisinopril (PRINIVIL/ZESTRIL) 5 MG tablet Take 1 tablet (5 mg) by mouth daily 30 tablet 1     omega 3 1000 MG CAPS Take 1 g by mouth daily 90 capsule      cinnamon 500 MG TABS        RANITIDINE HCL PO Take 150 mg by mouth  daily       NAPROXEN PO Take 250 mg by mouth 2 times daily (with meals)       nitroGLYcerin (NITROSTAT) 0.4 MG sublingual tablet For chest pain place 1 tablet under the tongue every 5 minutes for 3 doses. If symptoms persist 5 minutes after 1st dose call 911. (Patient not taking: Reported on 10/10/2017) 25 tablet 0       ALLERGIES   No Known Allergies    PAST MEDICAL HISTORY:  Past Medical History:   Diagnosis Date     NO ACTIVE PROBLEMS        PAST SURGICAL HISTORY:  Past Surgical History:   Procedure Laterality Date     AMPUTATION FINGER/THUMB       NO HISTORY OF SURGERY         FAMILY HISTORY:  Family History   Problem Relation Age of Onset     Coronary Artery Disease Mother 55     Hypertension Mother      Other Cancer Father      lung cancer     Unknown/Adopted Maternal Grandmother      Unknown/Adopted Maternal Grandfather      Unknown/Adopted Paternal Grandmother      Unknown/Adopted Paternal Grandfather      Substance Abuse Brother      alcoholism     Other Cancer Sister      throat cancer     Seizure Disorder Brother      Hypertension Sister        SOCIAL HISTORY:  Social History     Social History     Marital status:      Spouse name: N/A     Number of children: N/A     Years of education: N/A     Social History Main Topics     Smoking status: Former Smoker     Quit date: 9/14/1978     Smokeless tobacco: Never Used     Alcohol use No     Drug use: No     Sexual activity: Not Currently      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     Other Topics Concern     Not on file     Social History Narrative       Review of Systems:  Skin:  Negative       Eyes:  Positive for glasses    ENT:  Negative      Respiratory:  Negative       Cardiovascular:  Negative for;palpitations;edema;lightheadedness;dizziness chest pain;Positive for;fatigue chest pain across the chest 3 out of 10 on the pain scale felt like sore muscle, has had same pain before   Gastroenterology:        Genitourinary:  Negative     "  Musculoskeletal:  Negative      Neurologic:  Negative      Psychiatric:  Negative      Heme/Lymph/Imm:  Negative      Endocrine:  Negative        Physical Exam:  Vitals: /72 (BP Location: Left arm, Patient Position: Fowlers, Cuff Size: Adult Large)  Pulse 68  Ht 1.803 m (5' 11\")  Wt 85.2 kg (187 lb 14.4 oz)  SpO2 93%  BMI 26.21 kg/m2    Constitutional:           Skin:           Head:           Eyes:           ENT:           Neck:           Chest:             Cardiac:                    Abdomen:           Vascular:                                          Extremities and Back:                 Neurological:           Eliot Hernandez MD        Sharri Maya Mai, MD  [address] [facility] [place] [address]                 "

## 2017-10-10 NOTE — PATIENT INSTRUCTIONS
Angiogram prep gone through with patient and written instructions given to patient. Patient verbalized understanding.   Britta Gonsalez RN

## 2017-10-10 NOTE — LETTER
10/10/2017    Sharri Maya Mai, MD  [address] [place] [address]    RE: Dion López       Dear Colleague,    I had the pleasure of seeing Dion López in the [facility].    REASON FOR CLINIC VISIT:  Abnormal stress test, chest pain.      HISTORY OF PRESENT ILLNESS:  Mr. López is a pleasant 67-year-old gentleman with history of hypertension on lisinopril who recently underwent a stress test.  The reason stress test was done was because of chest discomfort episodes.  The patient underwent exercise nuclear stress test where he exercised for over 11 minutes achieving 13 METs and had 4/10 chest discomfort with exercise.  Stress EKG was nondiagnostic due to resting EKG abnormalities.  LVEF was 656% on stress test.  On myocardial perfusion imaging portion, there was small size mild intensity basal inferoseptal reversible defect.        The patient tells me about a month ago he had an episode of chest discomfort while he was talking to his chiropractor.  He felt an aching soreness across his back that was 8/10 in intensity and lasted for 2 minutes.  No other associated symptoms like shortness of breath, nausea, vomiting or sweating.  He had another episode a few days ago.  Again this time, he was talking to the chiropractor and had this time chest discomfort across the precordium that went to the back.  At this time, it was 3/10 in intensity and felt like a soreness, a little bit of a pressure sensation without any associated symptoms like shortness of breath, nausea, vomiting or sweating.  On both occasions, the discomfort went away on its own.        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  The more strenuous physical activity he does is simple walking, sometimes climbing stairs.  He tells me that he can get shortness of breath after climbing stairs but no particular chest discomfort.  He does not have any tobacco exposure.  His mother had a minor heart  attack when she was 50 years of age.  She  a few years ago at age 95.      The patient does not have any bleeding issues or any anticipated surgery.        Lipid panel done last month shows LDL of 98, HDL of 55.  Total cholesterol 177.  Recent BMP showed normal creatinine.        The other complaint the patient has is a feeling of exhaustion and fatigue and tiredness, which can happen with physical activity and can also happen without physical activity.      PHYSICAL EXAMINATION:   VITAL SIGNS:  Blood pressure 132/72, heart rate 68 irregular, weight 187 pounds, BMI 26.26.   GENERAL:  The patient appears pleasant, comfortable.   NECK:  Normal JVP, no bruit.   CARDIOVASCULAR SYSTEM:  S1, S2 normal, no murmur, rub or gallop.   RESPIRATORY SYSTEM:  Clear to auscultation bilaterally.     GASTROINTESTINAL:  Abdomen is soft and nontender.   EXTREMITIES:  No pitting pedal edema.   NEUROLOGICAL:  Alert course x3.   PSYCH:  Normal affect.   SKIN:  No obvious rash.     HEENT:  No pallor, icterus.      Nuclear exercise stress test imaging personally reviewed shows small size, mild intensity basal inferoseptal reversible defect.     Outpatient Encounter Prescriptions as of 10/10/2017   Medication Sig Dispense Refill     atorvastatin (LIPITOR) 20 MG tablet Take 1 tablet (20 mg) by mouth daily 90 tablet 3     [DISCONTINUED] metoprolol (LOPRESSOR) 25 MG tablet Take 0.5 tablets (12.5 mg) by mouth 2 times daily 45 tablet 3     MULTIPLE VITAMIN PO Take 1 tablet by mouth daily       Probiotic Product (PROBIOTIC DAILY PO) Take 1 tablet by mouth daily       calcium carbonate (OS-QUIANA 500 MG [place]. [place]) 1250 MG tablet Take 1 tablet by mouth daily       MAGNESIUM OXIDE PO Take 200 mg by mouth daily       Ferrous Sulfate (IRON SUPPLEMENT PO) Take 325 mg by mouth daily       B Complex-C (VITAMIN B COMPLEX W/VITAMIN C) TABS tablet Take 1 tablet by mouth daily       Ascorbic Acid (VITAMIN C PO) Take 1,000 mg by mouth daily       aspirin 81 MG  tablet Take 1 tablet (81 mg) by mouth daily (Patient not taking: Reported on 10/31/2017) 30 tablet      [DISCONTINUED] lisinopril (PRINIVIL/ZESTRIL) 5 MG tablet Take 1 tablet (5 mg) by mouth daily 30 tablet 1     omega 3 1000 MG CAPS Take 1 g by mouth daily 90 capsule      cinnamon 500 MG TABS        RANITIDINE HCL PO Take 150 mg by mouth daily       NAPROXEN PO Take 250 mg by mouth 2 times daily (with meals)       nitroGLYcerin (NITROSTAT) 0.4 MG sublingual tablet For chest pain place 1 tablet under the tongue every 5 minutes for 3 doses. If symptoms persist 5 minutes after 1st dose call 911. (Patient not taking: Reported on 10/10/2017) 25 tablet 0     No facility-administered encounter medications on file as of 10/10/2017.       IMPRESSION AND PLAN:  A pleasant 67-year-old gentleman with CAD risk factors of hypertension, possible family history of premature coronary artery disease who recently had chest discomfort episodes and then underwent exercise nuclear stress test and had 4/10 chest discomfort with exercise with evidence of some basal inferoseptal reversible defect.     I had a long discussion with patient regarding the nature of his symptoms and the stress test.  The symptoms of chest discomfort which he had twice over the last month sound somewhat atypical as it happens at rest and was short lasting.  The chest discomfort with exercise is somewhat concerning because of the nature of happening with exercise.  Remarkably, he exercised over 11 minutes.     We also discussed about the sensitivity and specificity of stress tests.  There is evidence of basal inferoseptal reversible defect.  We discussed at length about option of medical management for presumed coronary artery disease versus medical management plus coronary angiogram to confirm the diagnosis of coronary artery disease, risk stratify and possible revascularization.  We discussed the pros and cons of the each strategy with risks including but not  limited to risk of stroke, MI, death, need for PRBC transfusion, emergent bypass surgery for coronary angiogram.     After a long discussion, the patient wishes to proceed with coronary angiogram.  He understands the rationale for coronary angiogram, the risk involved, and the alternative of only medical therapy.  If needed he will be a reasonable candidate for long-term dual antiplatelet therapy.  I recommend adding Lipitor 20 mg daily and also low-dose beta blocker, metoprolol tartrate 12.5 mg b.i.d.  Common adverse of these medications were discussed with patient.  I cautioned him not to do any strenuous physical activity until coronary angiogram is done.   1.  Possible coronary artery disease on the basis of abnormal stress test.   2.  Episode of chest discomfort, somewhat atypical in nature as noted above.   3.  Hypertension, well controlled.   4.  Family history of possibly premature coronary artery disease.   5.  LDL 98.      RECOMMENDATIONS:     1.  Coronary angiogram with possible revascularization.   2.  Add Lipitor 20 mg daily.   3.  Add metoprolol tartrate 12.5 mg b.i.d.   4.  Continue lisinopril, aspirin.   5.  No strenuous physical activity until coronary angiogram is done.      Again, thank you for allowing me to participate in the care of your patient.      Sincerely,    Eliot Hernandez MD     [facility]

## 2017-10-10 NOTE — PROGRESS NOTES
REASON FOR CLINIC VISIT:  Abnormal stress test, chest pain.      HISTORY OF PRESENT ILLNESS:  Mr. López is a pleasant 67-year-old gentleman with history of hypertension on lisinopril who recently underwent a stress test.  The reason stress test was done was because of chest discomfort episodes.  The patient underwent exercise nuclear stress test where he exercised for over 11 minutes achieving 13 METs and had 4/10 chest discomfort with exercise.  Stress EKG was nondiagnostic due to resting EKG abnormalities.  LVEF was 656% on stress test.  On myocardial perfusion imaging portion, there was small size mild intensity basal inferoseptal reversible defect.        The patient tells me about a month ago he had an episode of chest discomfort while he was talking to his chiropractor.  He felt an aching soreness across his back that was 8/10 in intensity and lasted for 2 minutes.  No other associated symptoms like shortness of breath, nausea, vomiting or sweating.  He had another episode a few days ago.  Again this time, he was talking to the chiropractor and had this time chest discomfort across the precordium that went to the back.  At this time, it was 3/10 in intensity and felt like a soreness, a little bit of a pressure sensation without any associated symptoms like shortness of breath, nausea, vomiting or sweating.  On both occasions, the discomfort went away on its own.        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  The more strenuous physical activity he does is simple walking, sometimes climbing stairs.  He tells me that he can get shortness of breath after climbing stairs but no particular chest discomfort.  He does not have any tobacco exposure.  His mother had a minor heart attack when she was 50 years of age.  She  a few years ago at age 95.      The patient does not have any bleeding issues or any anticipated surgery.        Lipid panel done last month shows LDL of 98, HDL of  55.  Total cholesterol 177.  Recent BMP showed normal creatinine.        The other complaint the patient has is a feeling of exhaustion and fatigue and tiredness, which can happen with physical activity and can also happen without physical activity.      PHYSICAL EXAMINATION:   VITAL SIGNS:  Blood pressure 132/72, heart rate 68 irregular, weight 187 pounds, BMI 26.26.   GENERAL:  The patient appears pleasant, comfortable.   NECK:  Normal JVP, no bruit.   CARDIOVASCULAR SYSTEM:  S1, S2 normal, no murmur, rub or gallop.   RESPIRATORY SYSTEM:  Clear to auscultation bilaterally.     GASTROINTESTINAL:  Abdomen is soft and nontender.   EXTREMITIES:  No pitting pedal edema.   NEUROLOGICAL:  Alert course x3.   PSYCH:  Normal affect.   SKIN:  No obvious rash.     HEENT:  No pallor, icterus.      Nuclear exercise stress test imaging personally reviewed shows small size, mild intensity basal inferoseptal reversible defect.      IMPRESSION AND PLAN:  A pleasant 67-year-old gentleman with CAD risk factors of hypertension, possible family history of premature coronary artery disease who recently had chest discomfort episodes and then underwent exercise nuclear stress test and had 4/10 chest discomfort with exercise with evidence of some basal inferoseptal reversible defect.     I had a long discussion with patient regarding the nature of his symptoms and the stress test.  The symptoms of chest discomfort which he had twice over the last month sound somewhat atypical as it happens at rest and was short lasting.  The chest discomfort with exercise is somewhat concerning because of the nature of happening with exercise.  Remarkably, he exercised over 11 minutes.     We also discussed about the sensitivity and specificity of stress tests.  There is evidence of basal inferoseptal reversible defect.  We discussed at length about option of medical management for presumed coronary artery disease versus medical management plus coronary  angiogram to confirm the diagnosis of coronary artery disease, risk stratify and possible revascularization.  We discussed the pros and cons of the each strategy with risks including but not limited to risk of stroke, MI, death, need for PRBC transfusion, emergent bypass surgery for coronary angiogram.     After a long discussion, the patient wishes to proceed with coronary angiogram.  He understands the rationale for coronary angiogram, the risk involved, and the alternative of only medical therapy.  If needed he will be a reasonable candidate for long-term dual antiplatelet therapy.  I recommend adding Lipitor 20 mg daily and also low-dose beta blocker, metoprolol tartrate 12.5 mg b.i.d.  Common adverse of these medications were discussed with patient.  I cautioned him not to do any strenuous physical activity until coronary angiogram is done.   1.  Possible coronary artery disease on the basis of abnormal stress test.   2.  Episode of chest discomfort, somewhat atypical in nature as noted above.   3.  Hypertension, well controlled.   4.  Family history of possibly premature coronary artery disease.   5.  LDL 98.      RECOMMENDATIONS:     1.  Coronary angiogram with possible revascularization.   2.  Add Lipitor 20 mg daily.   3.  Add metoprolol tartrate 12.5 mg b.i.d.   4.  Continue lisinopril, aspirin.   5.  No strenuous physical activity until coronary angiogram is done.         AMY GIMENEZ MD             D: 10/10/2017 15:04   T: 10/10/2017 17:06   MT: PETE      Name:     KAVEH ROSA   MRN:      [id number]        Account:      [id number]   :      1950           Service Date: 10/10/2017      Document: [id number]

## 2017-10-10 NOTE — MR AVS SNAPSHOT
"              After Visit Summary   10/10/2017    Dion López    MRN: [id number]           Patient Information     Date Of Birth          1950        Visit Information        Provider Department      10/10/2017 2:00 PM Eliot Hernandez MD [facility]        Today's Diagnoses     Abnormal cardiovascular stress test    -  1       Follow-ups after your visit        Future tests that were ordered for you today     Open Future Orders        Priority Expected Expires Ordered    Cardiac Cath: Coronary Angiography Adult Order Routine 10/17/2017 10/10/2018 10/10/2017            Who to contact     If you have questions or need follow up information about today's clinic visit or your schedule please contact [facility] directly at [phone].  Normal or non-critical lab and imaging results will be communicated to you by MyChart, letter or phone within 4 business days after the clinic has received the results. If you do not hear from us within 7 days, please contact the clinic through HelloTelhart or phone. If you have a critical or abnormal lab result, we will notify you by phone as soon as possible.  Submit refill requests through Cartavi or call your pharmacy and they will forward the refill request to us. Please allow 3 business days for your refill to be completed.          Additional Information About Your Visit        MyChart Information     Cartavi lets you send messages to your doctor, view your test results, renew your prescriptions, schedule appointments and more. To sign up, go to [email or web address] . Click on \"Log in\" on the left side of the screen, which will take you to the Welcome page. Then click on \"Sign up Now\" on the right side of the page.     You will be asked to enter the access code listed below, as well as some personal information. Please follow the directions to create your username and password.     Your access code is: [email or web address]  Expires: 11/30/2017  4:30 PM   " "  Your access code will  in 90 days. If you need help or a new code, please call your [place] clinic or [phone].        Care EveryWhere ID     This is your Care EveryWhere ID. This could be used by other organizations to access your [facility] [place] medical records  [id number]        Your Vitals Were     Pulse Height Pulse Oximetry BMI (Body Mass Index)          68 1.803 m (5' 11\") 93% 26.21 kg/m2         Blood Pressure from Last 3 Encounters:   10/10/17 122/72   17 128/85   17 (P) 150/72    Weight from Last 3 Encounters:   10/10/17 85.2 kg (187 lb 14.4 oz)   17 83.5 kg (184 lb)   17 81.6 kg (180 lb)                 Today's Medication Changes          These changes are accurate as of: 10/10/17  2:55 PM.  If you have any questions, ask your nurse or doctor.               Start taking these medicines.        Dose/Directions    atorvastatin 20 MG tablet   Commonly known as:  LIPITOR   Used for:  Abnormal cardiovascular stress test        Dose:  20 mg   Take 1 tablet (20 mg) by mouth daily   Quantity:  90 tablet   Refills:  3       metoprolol 25 MG tablet   Commonly known as:  LOPRESSOR   Used for:  Abnormal cardiovascular stress test        Dose:  12.5 mg   Take 0.5 tablets (12.5 mg) by mouth 2 times daily   Quantity:  45 tablet   Refills:  3            Where to get your medicines      These medications were sent to [place] [address]  [address] [facility] [address]     Phone:  [phone]     atorvastatin 20 MG tablet    metoprolol 25 MG tablet                Primary Care Provider Office Phone # Fax #    Sharri Maya Mai, [phone] [address] [facility]   [facility] [address]        Equal Access to Services     SAIRA ROCHA : Killian Landrum, wamary ellen arteaga, qaybta kaalmajovani morgan, desi alexander. So [facility] [phone].    ATENCIÓN: Si habla español, tiene a garcia disposición servicios gratuitos " de asistencia lingüística. Ayana dale [phone].    We comply with applicable federal civil rights laws and [place] laws. We do not discriminate on the basis of race, color, national origin, age, disability, sex, sexual orientation, or gender identity.            Thank you!     Thank you for choosing [facility]  for your care. Our goal is always to provide you with excellent care. Hearing back from our patients is one way we can continue to improve our services. Please take a few minutes to complete the written survey that you may receive in the mail after your visit with us. Thank you!             Your Updated Medication List - Protect others around you: Learn how to safely use, store and throw away your medicines at www.disposemymeds.org.          This list is accurate as of: 10/10/17  2:55 PM.  Always use your most recent med list.                   Brand Name Dispense Instructions for use Diagnosis    aspirin 81 MG tablet     30 tablet    Take 1 tablet (81 mg) by mouth daily    Atypical chest pain       atorvastatin 20 MG tablet    LIPITOR    90 tablet    Take 1 tablet (20 mg) by mouth daily    Abnormal cardiovascular stress test       calcium carbonate 1250 MG tablet    OS-QUIANA 500 mg [place]. Ca     Take 1 tablet by mouth daily        cinnamon 500 MG Tabs           IRON SUPPLEMENT PO      Take 325 mg by mouth daily        lisinopril 5 MG tablet    PRINIVIL/ZESTRIL    30 tablet    Take 1 tablet (5 mg) by mouth daily    Benign essential hypertension       MAGNESIUM OXIDE PO      Take 200 mg by mouth daily        metoprolol 25 MG tablet    LOPRESSOR    45 tablet    Take 0.5 tablets (12.5 mg) by mouth 2 times daily    Abnormal cardiovascular stress test       MULTIPLE VITAMIN PO      Take 1 tablet by mouth daily        NAPROXEN PO      Take 250 mg by mouth 2 times daily (with meals)        nitroGLYcerin 0.4 MG sublingual tablet    NITROSTAT    25 tablet    For chest pain place 1 tablet under the  tongue every 5 minutes for 3 doses. If symptoms persist 5 minutes after 1st dose call 911.    Atypical chest pain       omega 3 1000 MG Caps     90 capsule    Take 1 g by mouth daily        PROBIOTIC DAILY PO      Take 1 tablet by mouth daily        RANITIDINE HCL PO      Take 150 mg by mouth daily        vitamin b complex w/vitamin C Tabs tablet      Take 1 tablet by mouth daily        VITAMIN C PO      Take 1,000 mg by mouth daily

## 2017-10-17 ENCOUNTER — TELEPHONE (OUTPATIENT)
Dept: CARDIOLOGY | Facility: CLINIC | Age: 67
End: 2017-10-17

## 2017-10-17 NOTE — TELEPHONE ENCOUNTER
Called in with questions concerning medications recently started by Dr. Hernandez.   Thought Acetaminophen was the same as ASA. Explained that he NEEDS to take the 81 mg of ASA daily, he can still use the tylenol for aches and pains, but not Ibuprofen.  Thought Atorvastatin was Calcium and wanted to clarify that he already took a supplement of calcium and vitamin D. Explained that Atorvastatin was to assist with getting his bad cholesterol lower.   Asked if he should stop his BP medication since he was starting on Metoprolol. Explained to him that for now he needs to take both.  Isn't there some natural Herb that I can take instead of all these prescribed medication? Instructed to take the medications for now. Angiogram on 10/20/17. Can have these questions reviewed afterwards at the f/u OV.   Reviewed the pre cath check list. Going over the medications to take on day of procedure, instructed to just take ASA, Metoprolol and Lisinopril. Verbalized understanding, but will call again and review.

## 2017-10-19 ENCOUNTER — TELEPHONE (OUTPATIENT)
Dept: CARDIOLOGY | Facility: CLINIC | Age: 67
End: 2017-10-19

## 2017-10-19 DIAGNOSIS — R94.39 ABNORMAL CARDIOVASCULAR STRESS TEST: Primary | ICD-10-CM

## 2017-10-19 RX ORDER — LIDOCAINE 40 MG/G
CREAM TOPICAL
Status: CANCELLED | OUTPATIENT
Start: 2017-10-19

## 2017-10-19 RX ORDER — POTASSIUM CHLORIDE 1500 MG/1
20 TABLET, EXTENDED RELEASE ORAL
Status: CANCELLED | OUTPATIENT
Start: 2017-10-19

## 2017-10-19 RX ORDER — SODIUM CHLORIDE 9 MG/ML
INJECTION, SOLUTION INTRAVENOUS CONTINUOUS
Status: CANCELLED | OUTPATIENT
Start: 2017-10-19

## 2017-10-19 NOTE — TELEPHONE ENCOUNTER
Called patient with Pre cath instructions:     Contrast allergy: NO  Anticoagulation: none   Metformin: none  Oral DM meds: none  Insulin: none  Diuretic: none  Use of phosphodiesterase type 5 inhibitor: no  Aspirin: 81 mg daily   Pt informed to be NPO at midnight  Renal issues none, Cr of 0.92  Pt has transportation and 24 hours post procedure monitoring set up.- Fuad Redd   Pt aware of no driving for 24 hours post procedure.     Pt aware of arrival time of 6:30 on 10/20/17 and location. Pt verbalized understanding of instructions.   Orders placed.

## 2017-10-20 ENCOUNTER — HOSPITAL ENCOUNTER (OUTPATIENT)
Facility: CLINIC | Age: 67
Discharge: HOME | End: 2017-10-20
Attending: INTERNAL MEDICINE | Admitting: INTERNAL MEDICINE
Payer: COMMERCIAL

## 2017-10-20 ENCOUNTER — APPOINTMENT (OUTPATIENT)
Dept: CARDIOLOGY | Facility: CLINIC | Age: 67
End: 2017-10-20
Attending: INTERNAL MEDICINE
Payer: COMMERCIAL

## 2017-10-20 VITALS
HEART RATE: 51 BPM | SYSTOLIC BLOOD PRESSURE: 116 MMHG | HEIGHT: 72 IN | BODY MASS INDEX: 25.27 KG/M2 | OXYGEN SATURATION: 97 % | WEIGHT: 186.6 LBS | DIASTOLIC BLOOD PRESSURE: 75 MMHG | TEMPERATURE: 97.7 F | RESPIRATION RATE: 20 BRPM

## 2017-10-20 DIAGNOSIS — Z98.890 STATUS POST CORONARY ANGIOGRAM: Primary | ICD-10-CM

## 2017-10-20 DIAGNOSIS — R94.39 ABNORMAL CARDIOVASCULAR STRESS TEST: ICD-10-CM

## 2017-10-20 LAB
ANION GAP SERPL CALCULATED.3IONS-SCNC: 6 MMOL/L (ref 3–14)
APTT PPP: 32 SEC (ref 22–37)
BUN SERPL-MCNC: 25 MG/DL (ref 7–30)
CALCIUM SERPL-MCNC: 8.5 MG/DL (ref 8.5–10.1)
CHLORIDE SERPL-SCNC: 112 MMOL/L (ref 94–109)
CO2 SERPL-SCNC: 26 MMOL/L (ref 20–32)
CREAT SERPL-MCNC: 0.95 MG/DL (ref 0.66–1.25)
ERYTHROCYTE [DISTWIDTH] IN BLOOD BY AUTOMATED COUNT: 13.3 % (ref 10–15)
GFR SERPL CREATININE-BSD FRML MDRD: 79 ML/MIN/1.7M2
GLUCOSE SERPL-MCNC: 86 MG/DL (ref 70–99)
HCT VFR BLD AUTO: 40.1 % (ref 40–53)
HGB BLD-MCNC: 14.2 G/DL (ref 13.3–17.7)
INR PPP: 0.86 (ref 0.86–1.14)
MCH RBC QN AUTO: 31.5 PG (ref 26.5–33)
MCHC RBC AUTO-ENTMCNC: 35.4 G/DL (ref 31.5–36.5)
MCV RBC AUTO: 89 FL (ref 78–100)
PLATELET # BLD AUTO: 225 10E9/L (ref 150–450)
POTASSIUM SERPL-SCNC: 3.8 MMOL/L (ref 3.4–5.3)
RBC # BLD AUTO: 4.51 10E12/L (ref 4.4–5.9)
SODIUM SERPL-SCNC: 144 MMOL/L (ref 133–144)
WBC # BLD AUTO: 6.5 10E9/L (ref 4–11)

## 2017-10-20 PROCEDURE — 25000125 ZZHC RX 250: Performed by: INTERNAL MEDICINE

## 2017-10-20 PROCEDURE — 27210856 ZZH ACCESS HEART CATH CR2

## 2017-10-20 PROCEDURE — 4A023N7 MEASUREMENT OF CARDIAC SAMPLING AND PRESSURE, LEFT HEART, PERCUTANEOUS APPROACH: ICD-10-PCS | Performed by: INTERNAL MEDICINE

## 2017-10-20 PROCEDURE — 99152 MOD SED SAME PHYS/QHP 5/>YRS: CPT

## 2017-10-20 PROCEDURE — 85730 THROMBOPLASTIN TIME PARTIAL: CPT | Performed by: INTERNAL MEDICINE

## 2017-10-20 PROCEDURE — 40000852 ZZH STATISTIC HEART CATH LAB OR EP LAB

## 2017-10-20 PROCEDURE — 93005 ELECTROCARDIOGRAM TRACING: CPT

## 2017-10-20 PROCEDURE — C1769 GUIDE WIRE: HCPCS

## 2017-10-20 PROCEDURE — 27210795 ZZH PAD DEFIB QUICK CR4

## 2017-10-20 PROCEDURE — 93010 ELECTROCARDIOGRAM REPORT: CPT | Mod: 59 | Performed by: INTERNAL MEDICINE

## 2017-10-20 PROCEDURE — 85027 COMPLETE CBC AUTOMATED: CPT | Performed by: INTERNAL MEDICINE

## 2017-10-20 PROCEDURE — 40000065 ZZH STATISTIC EKG NON-CHARGEABLE

## 2017-10-20 PROCEDURE — 27211089 ZZH KIT ACIST INJECTOR CR3

## 2017-10-20 PROCEDURE — 27210914 ZZH SHEATH CR8

## 2017-10-20 PROCEDURE — 85610 PROTHROMBIN TIME: CPT | Performed by: INTERNAL MEDICINE

## 2017-10-20 PROCEDURE — 99153 MOD SED SAME PHYS/QHP EA: CPT

## 2017-10-20 PROCEDURE — 27210787 ZZH MANIFOLD CR2

## 2017-10-20 PROCEDURE — B2111ZZ FLUOROSCOPY OF MULTIPLE CORONARY ARTERIES USING LOW OSMOLAR CONTRAST: ICD-10-PCS | Performed by: INTERNAL MEDICINE

## 2017-10-20 PROCEDURE — 25000128 H RX IP 250 OP 636: Performed by: INTERNAL MEDICINE

## 2017-10-20 PROCEDURE — 93458 L HRT ARTERY/VENTRICLE ANGIO: CPT | Mod: 26 | Performed by: INTERNAL MEDICINE

## 2017-10-20 PROCEDURE — 93458 L HRT ARTERY/VENTRICLE ANGIO: CPT

## 2017-10-20 PROCEDURE — 27210946 ZZH KIT HC TOTES DISP CR8

## 2017-10-20 PROCEDURE — 99152 MOD SED SAME PHYS/QHP 5/>YRS: CPT | Performed by: INTERNAL MEDICINE

## 2017-10-20 PROCEDURE — 80048 BASIC METABOLIC PNL TOTAL CA: CPT | Performed by: INTERNAL MEDICINE

## 2017-10-20 PROCEDURE — 27210892 ZZH CATH CR4

## 2017-10-20 RX ORDER — ASPIRIN 81 MG/1
81-324 TABLET, CHEWABLE ORAL
Status: DISCONTINUED | OUTPATIENT
Start: 2017-10-20 | End: 2017-10-20 | Stop reason: HOSPADM

## 2017-10-20 RX ORDER — METOPROLOL TARTRATE 1 MG/ML
5 INJECTION, SOLUTION INTRAVENOUS EVERY 5 MIN PRN
Status: DISCONTINUED | OUTPATIENT
Start: 2017-10-20 | End: 2017-10-20 | Stop reason: HOSPADM

## 2017-10-20 RX ORDER — FLUMAZENIL 0.1 MG/ML
0.2 INJECTION, SOLUTION INTRAVENOUS
Status: DISCONTINUED | OUTPATIENT
Start: 2017-10-20 | End: 2017-10-20 | Stop reason: HOSPADM

## 2017-10-20 RX ORDER — ATROPINE SULFATE 0.1 MG/ML
.5-1 INJECTION INTRAVENOUS
Status: DISCONTINUED | OUTPATIENT
Start: 2017-10-20 | End: 2017-10-20 | Stop reason: HOSPADM

## 2017-10-20 RX ORDER — IOPAMIDOL 755 MG/ML
50 INJECTION, SOLUTION INTRAVASCULAR ONCE
Status: COMPLETED | OUTPATIENT
Start: 2017-10-20 | End: 2017-10-20

## 2017-10-20 RX ORDER — METHYLPREDNISOLONE SODIUM SUCCINATE 125 MG/2ML
125 INJECTION, POWDER, LYOPHILIZED, FOR SOLUTION INTRAMUSCULAR; INTRAVENOUS
Status: DISCONTINUED | OUTPATIENT
Start: 2017-10-20 | End: 2017-10-20 | Stop reason: HOSPADM

## 2017-10-20 RX ORDER — DOBUTAMINE HYDROCHLORIDE 200 MG/100ML
2-20 INJECTION INTRAVENOUS CONTINUOUS PRN
Status: DISCONTINUED | OUTPATIENT
Start: 2017-10-20 | End: 2017-10-20 | Stop reason: HOSPADM

## 2017-10-20 RX ORDER — ATROPINE SULFATE 0.1 MG/ML
0.5 INJECTION INTRAVENOUS EVERY 5 MIN PRN
Status: DISCONTINUED | OUTPATIENT
Start: 2017-10-20 | End: 2017-10-20 | Stop reason: HOSPADM

## 2017-10-20 RX ORDER — FUROSEMIDE 10 MG/ML
20-100 INJECTION INTRAMUSCULAR; INTRAVENOUS
Status: DISCONTINUED | OUTPATIENT
Start: 2017-10-20 | End: 2017-10-20 | Stop reason: HOSPADM

## 2017-10-20 RX ORDER — NALOXONE HYDROCHLORIDE 0.4 MG/ML
.2-.4 INJECTION, SOLUTION INTRAMUSCULAR; INTRAVENOUS; SUBCUTANEOUS
Status: DISCONTINUED | OUTPATIENT
Start: 2017-10-20 | End: 2017-10-20 | Stop reason: HOSPADM

## 2017-10-20 RX ORDER — CLOPIDOGREL BISULFATE 75 MG/1
75 TABLET ORAL
Status: DISCONTINUED | OUTPATIENT
Start: 2017-10-20 | End: 2017-10-20 | Stop reason: HOSPADM

## 2017-10-20 RX ORDER — BUPIVACAINE HYDROCHLORIDE 2.5 MG/ML
1-10 INJECTION, SOLUTION EPIDURAL; INFILTRATION; INTRACAUDAL
Status: DISCONTINUED | OUTPATIENT
Start: 2017-10-20 | End: 2017-10-20 | Stop reason: HOSPADM

## 2017-10-20 RX ORDER — ONDANSETRON 2 MG/ML
4 INJECTION INTRAMUSCULAR; INTRAVENOUS EVERY 4 HOURS PRN
Status: DISCONTINUED | OUTPATIENT
Start: 2017-10-20 | End: 2017-10-20 | Stop reason: HOSPADM

## 2017-10-20 RX ORDER — DIPHENHYDRAMINE HYDROCHLORIDE 50 MG/ML
25-50 INJECTION INTRAMUSCULAR; INTRAVENOUS
Status: DISCONTINUED | OUTPATIENT
Start: 2017-10-20 | End: 2017-10-20 | Stop reason: HOSPADM

## 2017-10-20 RX ORDER — LIDOCAINE HYDROCHLORIDE 10 MG/ML
1-10 INJECTION, SOLUTION EPIDURAL; INFILTRATION; INTRACAUDAL; PERINEURAL
Status: COMPLETED | OUTPATIENT
Start: 2017-10-20 | End: 2017-10-20

## 2017-10-20 RX ORDER — MORPHINE SULFATE 2 MG/ML
1-2 INJECTION, SOLUTION INTRAMUSCULAR; INTRAVENOUS EVERY 5 MIN PRN
Status: DISCONTINUED | OUTPATIENT
Start: 2017-10-20 | End: 2017-10-20 | Stop reason: HOSPADM

## 2017-10-20 RX ORDER — ADENOSINE 3 MG/ML
12-12000 INJECTION, SOLUTION INTRAVENOUS
Status: DISCONTINUED | OUTPATIENT
Start: 2017-10-20 | End: 2017-10-20 | Stop reason: HOSPADM

## 2017-10-20 RX ORDER — PROTAMINE SULFATE 10 MG/ML
1-5 INJECTION, SOLUTION INTRAVENOUS
Status: DISCONTINUED | OUTPATIENT
Start: 2017-10-20 | End: 2017-10-20 | Stop reason: HOSPADM

## 2017-10-20 RX ORDER — NITROGLYCERIN 5 MG/ML
100-200 VIAL (ML) INTRAVENOUS
Status: DISCONTINUED | OUTPATIENT
Start: 2017-10-20 | End: 2017-10-20 | Stop reason: HOSPADM

## 2017-10-20 RX ORDER — LIDOCAINE 40 MG/G
CREAM TOPICAL
Status: DISCONTINUED | OUTPATIENT
Start: 2017-10-20 | End: 2017-10-20 | Stop reason: HOSPADM

## 2017-10-20 RX ORDER — NITROGLYCERIN 0.4 MG/1
0.4 TABLET SUBLINGUAL EVERY 5 MIN PRN
Status: DISCONTINUED | OUTPATIENT
Start: 2017-10-20 | End: 2017-10-20 | Stop reason: HOSPADM

## 2017-10-20 RX ORDER — SODIUM NITROPRUSSIDE 25 MG/ML
100-200 INJECTION INTRAVENOUS
Status: DISCONTINUED | OUTPATIENT
Start: 2017-10-20 | End: 2017-10-20 | Stop reason: HOSPADM

## 2017-10-20 RX ORDER — PROTAMINE SULFATE 10 MG/ML
25-100 INJECTION, SOLUTION INTRAVENOUS EVERY 5 MIN PRN
Status: DISCONTINUED | OUTPATIENT
Start: 2017-10-20 | End: 2017-10-20 | Stop reason: HOSPADM

## 2017-10-20 RX ORDER — NALOXONE HYDROCHLORIDE 0.4 MG/ML
0.4 INJECTION, SOLUTION INTRAMUSCULAR; INTRAVENOUS; SUBCUTANEOUS EVERY 5 MIN PRN
Status: DISCONTINUED | OUTPATIENT
Start: 2017-10-20 | End: 2017-10-20 | Stop reason: HOSPADM

## 2017-10-20 RX ORDER — PRASUGREL 10 MG/1
10-60 TABLET, FILM COATED ORAL
Status: DISCONTINUED | OUTPATIENT
Start: 2017-10-20 | End: 2017-10-20 | Stop reason: HOSPADM

## 2017-10-20 RX ORDER — POTASSIUM CHLORIDE 29.8 MG/ML
20 INJECTION INTRAVENOUS
Status: DISCONTINUED | OUTPATIENT
Start: 2017-10-20 | End: 2017-10-20 | Stop reason: HOSPADM

## 2017-10-20 RX ORDER — ENALAPRILAT 1.25 MG/ML
1.25-2.5 INJECTION INTRAVENOUS
Status: DISCONTINUED | OUTPATIENT
Start: 2017-10-20 | End: 2017-10-20 | Stop reason: HOSPADM

## 2017-10-20 RX ORDER — PROMETHAZINE HYDROCHLORIDE 25 MG/ML
6.25-25 INJECTION, SOLUTION INTRAMUSCULAR; INTRAVENOUS EVERY 4 HOURS PRN
Status: DISCONTINUED | OUTPATIENT
Start: 2017-10-20 | End: 2017-10-20 | Stop reason: HOSPADM

## 2017-10-20 RX ORDER — POTASSIUM CHLORIDE 1500 MG/1
20 TABLET, EXTENDED RELEASE ORAL
Status: DISCONTINUED | OUTPATIENT
Start: 2017-10-20 | End: 2017-10-20 | Stop reason: HOSPADM

## 2017-10-20 RX ORDER — ASPIRIN 325 MG
325 TABLET ORAL
Status: DISCONTINUED | OUTPATIENT
Start: 2017-10-20 | End: 2017-10-20 | Stop reason: HOSPADM

## 2017-10-20 RX ORDER — VERAPAMIL HYDROCHLORIDE 2.5 MG/ML
1-2.5 INJECTION, SOLUTION INTRAVENOUS
Status: COMPLETED | OUTPATIENT
Start: 2017-10-20 | End: 2017-10-20

## 2017-10-20 RX ORDER — NICARDIPINE HYDROCHLORIDE 2.5 MG/ML
100 INJECTION INTRAVENOUS
Status: DISCONTINUED | OUTPATIENT
Start: 2017-10-20 | End: 2017-10-20 | Stop reason: HOSPADM

## 2017-10-20 RX ORDER — SODIUM CHLORIDE 9 MG/ML
INJECTION, SOLUTION INTRAVENOUS CONTINUOUS
Status: DISCONTINUED | OUTPATIENT
Start: 2017-10-20 | End: 2017-10-20 | Stop reason: HOSPADM

## 2017-10-20 RX ORDER — FENTANYL CITRATE 50 UG/ML
25-50 INJECTION, SOLUTION INTRAMUSCULAR; INTRAVENOUS
Status: DISCONTINUED | OUTPATIENT
Start: 2017-10-20 | End: 2017-10-20 | Stop reason: HOSPADM

## 2017-10-20 RX ORDER — NITROGLYCERIN 5 MG/ML
100-500 VIAL (ML) INTRAVENOUS
Status: COMPLETED | OUTPATIENT
Start: 2017-10-20 | End: 2017-10-20

## 2017-10-20 RX ORDER — HEPARIN SODIUM 1000 [USP'U]/ML
1000-10000 INJECTION, SOLUTION INTRAVENOUS; SUBCUTANEOUS EVERY 5 MIN PRN
Status: DISCONTINUED | OUTPATIENT
Start: 2017-10-20 | End: 2017-10-20 | Stop reason: HOSPADM

## 2017-10-20 RX ORDER — PHENYLEPHRINE HCL IN 0.9% NACL 1 MG/10 ML
20-100 SYRINGE (ML) INTRAVENOUS
Status: DISCONTINUED | OUTPATIENT
Start: 2017-10-20 | End: 2017-10-20 | Stop reason: HOSPADM

## 2017-10-20 RX ORDER — EPINEPHRINE 1 MG/ML
0.3 INJECTION, SOLUTION, CONCENTRATE INTRAVENOUS
Status: DISCONTINUED | OUTPATIENT
Start: 2017-10-20 | End: 2017-10-20 | Stop reason: HOSPADM

## 2017-10-20 RX ORDER — HYDRALAZINE HYDROCHLORIDE 20 MG/ML
10-20 INJECTION INTRAMUSCULAR; INTRAVENOUS
Status: DISCONTINUED | OUTPATIENT
Start: 2017-10-20 | End: 2017-10-20 | Stop reason: HOSPADM

## 2017-10-20 RX ORDER — NALOXONE HYDROCHLORIDE 0.4 MG/ML
.1-.4 INJECTION, SOLUTION INTRAMUSCULAR; INTRAVENOUS; SUBCUTANEOUS
Status: DISCONTINUED | OUTPATIENT
Start: 2017-10-20 | End: 2017-10-20 | Stop reason: HOSPADM

## 2017-10-20 RX ORDER — CLOPIDOGREL 300 MG/1
300-600 TABLET, FILM COATED ORAL
Status: DISCONTINUED | OUTPATIENT
Start: 2017-10-20 | End: 2017-10-20 | Stop reason: HOSPADM

## 2017-10-20 RX ORDER — LIDOCAINE HYDROCHLORIDE 10 MG/ML
30 INJECTION, SOLUTION EPIDURAL; INFILTRATION; INTRACAUDAL; PERINEURAL
Status: DISCONTINUED | OUTPATIENT
Start: 2017-10-20 | End: 2017-10-20 | Stop reason: HOSPADM

## 2017-10-20 RX ORDER — NIFEDIPINE 10 MG/1
10 CAPSULE ORAL
Status: DISCONTINUED | OUTPATIENT
Start: 2017-10-20 | End: 2017-10-20 | Stop reason: HOSPADM

## 2017-10-20 RX ORDER — DOPAMINE HYDROCHLORIDE 160 MG/100ML
2-20 INJECTION, SOLUTION INTRAVENOUS CONTINUOUS PRN
Status: DISCONTINUED | OUTPATIENT
Start: 2017-10-20 | End: 2017-10-20 | Stop reason: HOSPADM

## 2017-10-20 RX ORDER — LORAZEPAM 2 MG/ML
.5-2 INJECTION INTRAMUSCULAR EVERY 4 HOURS PRN
Status: DISCONTINUED | OUTPATIENT
Start: 2017-10-20 | End: 2017-10-20 | Stop reason: HOSPADM

## 2017-10-20 RX ORDER — ACETAMINOPHEN 325 MG/1
325-650 TABLET ORAL EVERY 4 HOURS PRN
Status: DISCONTINUED | OUTPATIENT
Start: 2017-10-20 | End: 2017-10-20 | Stop reason: HOSPADM

## 2017-10-20 RX ORDER — DEXTROSE MONOHYDRATE 25 G/50ML
12.5-5 INJECTION, SOLUTION INTRAVENOUS EVERY 30 MIN PRN
Status: DISCONTINUED | OUTPATIENT
Start: 2017-10-20 | End: 2017-10-20 | Stop reason: HOSPADM

## 2017-10-20 RX ORDER — NITROGLYCERIN 20 MG/100ML
.07-2 INJECTION INTRAVENOUS CONTINUOUS PRN
Status: DISCONTINUED | OUTPATIENT
Start: 2017-10-20 | End: 2017-10-20 | Stop reason: HOSPADM

## 2017-10-20 RX ORDER — HYDROCODONE BITARTRATE AND ACETAMINOPHEN 5; 325 MG/1; MG/1
1-2 TABLET ORAL EVERY 4 HOURS PRN
Status: DISCONTINUED | OUTPATIENT
Start: 2017-10-20 | End: 2017-10-20 | Stop reason: HOSPADM

## 2017-10-20 RX ORDER — POTASSIUM CHLORIDE 7.45 MG/ML
10 INJECTION INTRAVENOUS
Status: DISCONTINUED | OUTPATIENT
Start: 2017-10-20 | End: 2017-10-20 | Stop reason: HOSPADM

## 2017-10-20 RX ADMIN — MIDAZOLAM HYDROCHLORIDE 1 MG: 1 INJECTION, SOLUTION INTRAMUSCULAR; INTRAVENOUS at 08:48

## 2017-10-20 RX ADMIN — Medication 4500 UNITS: at 08:52

## 2017-10-20 RX ADMIN — FENTANYL CITRATE 50 MCG: 50 INJECTION, SOLUTION INTRAMUSCULAR; INTRAVENOUS at 08:47

## 2017-10-20 RX ADMIN — SODIUM CHLORIDE: 9 INJECTION, SOLUTION INTRAVENOUS at 07:27

## 2017-10-20 RX ADMIN — IOPAMIDOL 50 ML: 755 INJECTION, SOLUTION INTRAVASCULAR at 09:15

## 2017-10-20 RX ADMIN — LIDOCAINE HYDROCHLORIDE 10 MG: 10 INJECTION, SOLUTION EPIDURAL; INFILTRATION; INTRACAUDAL; PERINEURAL at 08:48

## 2017-10-20 RX ADMIN — NITROGLYCERIN 200 MCG: 5 INJECTION, SOLUTION INTRAVENOUS at 08:48

## 2017-10-20 RX ADMIN — VERAPAMIL HYDROCHLORIDE 2.5 MG: 2.5 INJECTION, SOLUTION INTRAVENOUS at 08:49

## 2017-10-20 NOTE — IP AVS SNAPSHOT
MRN:[id number]                      After Visit Summary   10/20/2017    Dion López    MRN: [id number]           Visit Information        Department      10/20/2017  6:27 AM [facility]          Review of your medicines      CONTINUE these medicines which have NOT CHANGED        Dose / Directions    aspirin 81 MG tablet   Used for:  Atypical chest pain        Dose:  81 mg   Take 1 tablet (81 mg) by mouth daily   Quantity:  30 tablet   Refills:  0       atorvastatin 20 MG tablet   Commonly known as:  LIPITOR   Used for:  Abnormal cardiovascular stress test        Dose:  20 mg   Take 1 tablet (20 mg) by mouth daily   Quantity:  90 tablet   Refills:  3       calcium carbonate 1250 MG tablet   Commonly known as:  OS-QUIANA 500 mg [place]. Ca        Dose:  1 tablet   Take 1 tablet by mouth daily   Refills:  0       cinnamon 500 MG Tabs        Refills:  0       IRON SUPPLEMENT PO        Dose:  325 mg   Take 325 mg by mouth daily   Refills:  0       lisinopril 5 MG tablet   Commonly known as:  PRINIVIL/ZESTRIL   Used for:  Benign essential hypertension        Dose:  5 mg   Take 1 tablet (5 mg) by mouth daily   Quantity:  30 tablet   Refills:  1       MAGNESIUM OXIDE PO        Dose:  200 mg   Take 200 mg by mouth daily   Refills:  0       MULTIPLE VITAMIN PO        Dose:  1 tablet   Take 1 tablet by mouth daily   Refills:  0       NAPROXEN PO        Dose:  250 mg   Take 250 mg by mouth 2 times daily (with meals)   Refills:  0       nitroGLYcerin 0.4 MG sublingual tablet   Commonly known as:  NITROSTAT   Used for:  Atypical chest pain        For chest pain place 1 tablet under the tongue every 5 minutes for 3 doses. If symptoms persist 5 minutes after 1st dose call 911.   Quantity:  25 tablet   Refills:  0       omega 3 1000 MG Caps        Dose:  1 g   Take 1 g by mouth daily   Quantity:  90 capsule   Refills:  0       PROBIOTIC DAILY PO        Dose:  1 tablet   Take 1 tablet by mouth daily    Refills:  0       RANITIDINE HCL PO        Dose:  150 mg   Take 150 mg by mouth daily   Refills:  0       vitamin b complex w/vitamin C Tabs tablet        Dose:  1 tablet   Take 1 tablet by mouth daily   Refills:  0       VITAMIN C PO        Dose:  1000 mg   Take 1,000 mg by mouth daily   Refills:  0         STOP taking     metoprolol 25 MG tablet   Commonly known as:  LOPRESSOR                    Protect others around you: Learn how to safely use, store and throw away your medicines at www.disposemymeds.org.         Follow-ups after your visit        Your next 10 appointments already scheduled     Oct 31, 2017  2:15 PM CDT   [facility] Shanika Mckenzie [facility]-C   [facility]    [address] [place] [id number] [phone]               Care Instructions        After Care Instructions     Discharge Instructions - Follow up with [facility] Nurse Practitioner        Follow up with [facility] Nurse Practitioner at [facility] of patient preference in 7-10 days.            Discharge Instructions - IF on Metformin (Glucophage or Glucovance) or Metformin containing medications       IF on Metformin (Glucophage or Glucovance) or Metformin containing medications , schedule a Basic Metabolic Panel at [facility] or Primary Clinic in 48 - 72 hours post procedure and PRIOR TO resuming the Metformin or Metformin containing medications.  Hold Metformin (Glucophage or Glucovance) or Metformin containing medications until after the Basic Metabolic Panel on the 2nd or 3rd day following the procedure.  May resume after blood draw is complete.                  Further instructions from your care team       Cardiac Angiogram Discharge Instructions - Radial    After you go home:      Have an adult stay with you until tomorrow.    Drink extra fluids for 2 days.    You may resume your normal diet.    No smoking       For 24 hours - due to the sedation you  received:    Relax and take it easy.    Do NOT make any important or legal decisions.    Do NOT drive or operate machines at home or at work.    Do NOT drink alcohol.    Care of Wrist Puncture Site:      For the first 24 hrs - check the puncture site every 1-2 hours while awake.    It is normal to have soreness at the puncture site and mild tingling in your hand for up to 3 days.    Remove the bandaid after 24 hours. If there is minor oozing, apply another bandaid and remove it after 12 hours.    You may shower tomorrow.  Do NOT take a bath, or use a hot tub or pool for at least 3 days. Do NOT scrub the site. Do not use lotion or powder near the puncture site.           Activity:        For 2 days:     do not use your hand or arm to support your weight (such as rising from a chair)     do not bend your wrist (such as lifting a garage door).    do not lift more than 5 pounds or exercise your arm (such as tennis, golf or bowling).    Do NOT do any heavy activity such as exercise, lifting, or straining.     Bleeding:      If you start bleeding from the site in your wrist, sit down and press firmly on/above the site for 10 minutes.     Once bleeding stops, keep arm still for 2 hours.     Call [facility] as soon as you can.       Call 911 right away if you have heavy bleeding or bleeding that does not stop.      Medicines:        Take your medications, unless your provider tells you not to.      If you have stopped any medicines, check with your provider about when to restart them.    Follow Up Appointments:      Follow up with [facility] Nurse Practitioner at [facility] of patient preference in 7-10 days.    Call the clinic if:      You have a large or growing hard lump around the site.    The site is red, swollen, hot or tender.    Blood or fluid is draining from the site.    You have chills or a fever greater than 101 F (38 C).    Your arm turns feels numb, cool or changes color.    You have hives, a rash or  "unusual itching.    Any questions or concerns.          [facility] [place]:    [phone] UMP (7 days a week)             Additional Information About Your Visit        MyChart Information     Exogenesishart lets you send messages to your doctor, view your test results, renew your prescriptions, schedule appointments and more. To sign up, go to [email or web address] . Click on \"Log in\" on the left side of the screen, which will take you to the Welcome page. Then click on \"Sign up Now\" on the right side of the page.     You will be asked to enter the access code listed below, as well as some personal information. Please follow the directions to create your username and password.     Your access code is: [email or web address]  Expires: 2017  4:30 PM     Your access code will  in 90 days. If you need help or a new code, please call your [place] clinic or [phone].        Care EveryWhere ID     This is your Care EveryWhere ID. This could be used by other organizations to access your [place] medical records  [id number]        Your Vitals Were     Blood Pressure Pulse Temperature Respirations Height Weight    129/73 51 97.7  F (36.5  C) (Oral) 20 1.829 m (6') 84.6 kg (186 lb 9.6 oz)    Pulse Oximetry BMI (Body Mass Index)                95% 25.31 kg/m2           Primary Care Provider Office Phone # Fax #    Daltonjohn Maya Mai, [phone]      Equal Access to Services     [facility] : Hadii aad ku hadasho Sorosaali, waaxda luqadaha, qaybta kaalmada adeegyada, desi alexander. So [facility] [phone].    ATENCIÓN: Si habla español, tiene a garcia disposición servicios gratuitos de asistencia lingüística. Llame al [phone].    We comply with applicable federal civil rights laws and [place] laws. We do not discriminate on the basis of race, color, national origin, age, disability, sex, sexual orientation, or gender identity.            Thank you!  "    Thank you for choosing [facility] for your care. Our goal is always to provide you with excellent care. Hearing back from our patients is one way we can continue to improve our services. Please take a few minutes to complete the written survey that you may receive in the mail after you visit with us. Thank you!             Medication List: This is a list of all your medications and when to take them. Check marks below indicate your daily home schedule. Keep this list as a reference.      Medications           Morning Afternoon Evening Bedtime As Needed    aspirin 81 MG tablet   Take 1 tablet (81 mg) by mouth daily                                atorvastatin 20 MG tablet   Commonly known as:  LIPITOR   Take 1 tablet (20 mg) by mouth daily                                calcium carbonate 1250 MG tablet   Commonly known as:  OS-QUIANA 500 mg [place]. Ca   Take 1 tablet by mouth daily                                cinnamon 500 MG Tabs                                IRON SUPPLEMENT PO   Take 325 mg by mouth daily                                lisinopril 5 MG tablet   Commonly known as:  PRINIVIL/ZESTRIL   Take 1 tablet (5 mg) by mouth daily                                MAGNESIUM OXIDE PO   Take 200 mg by mouth daily                                MULTIPLE VITAMIN PO   Take 1 tablet by mouth daily                                NAPROXEN PO   Take 250 mg by mouth 2 times daily (with meals)                                nitroGLYcerin 0.4 MG sublingual tablet   Commonly known as:  NITROSTAT   For chest pain place 1 tablet under the tongue every 5 minutes for 3 doses. If symptoms persist 5 minutes after 1st dose call 911.                                omega 3 1000 MG Caps   Take 1 g by mouth daily                                PROBIOTIC DAILY PO   Take 1 tablet by mouth daily                                RANITIDINE HCL PO   Take 150 mg by mouth daily                                vitamin b complex w/vitamin C Tabs tablet    Take 1 tablet by mouth daily                                VITAMIN C PO   Take 1,000 mg by mouth daily

## 2017-10-20 NOTE — IP AVS SNAPSHOT
[place] [address]    [place] [address]    Phone:  [phone]                                       After Visit Summary   10/20/2017    Dion López    MRN: [id number]           After Visit Summary Signature Page     I have received my discharge instructions, and my questions have been answered. I have discussed any challenges I see with this plan with the nurse or doctor.    ..........................................................................................................................................  Patient/Patient Representative Signature      ..........................................................................................................................................  Patient Representative Print Name and Relationship to Patient    ..................................................               ................................................  Date                                            Time    ..........................................................................................................................................  Reviewed by Signature/Title    ...................................................              ..............................................  Date                                                            Time

## 2017-10-20 NOTE — PROGRESS NOTES
Pt returned from cath lab via bed from cath lab.  Denies pain, nausea or vomiting. Right radial TR band in place without bleeding or hematoma. Tolerating po food and fluids.  Family at bedside and MD in to see pt.

## 2017-10-20 NOTE — PROGRESS NOTES
1130  R radial site remains stable.  Bandaid/armboard applied.  Patient ambulated without difficulty.  Patient states understanding of discharge instructions.  Plan is to discharge at noon.

## 2017-10-20 NOTE — PROGRESS NOTES
Pre procedure plan of care reviewed with pt and son prior to sedation.  All questions answered and pt appears to accept and understand.

## 2017-10-20 NOTE — PROGRESS NOTES
Pt D/C to home with son driving.  Ambulating safely at time of D/C Right radial site dry and intact without bleeding or hematoma.  Denies pain, nausea or vomiting.  No additional questions at this time.

## 2017-10-20 NOTE — DISCHARGE INSTRUCTIONS
Cardiac Angiogram Discharge Instructions - Radial    After you go home:      Have an adult stay with you until tomorrow.    Drink extra fluids for 2 days.    You may resume your normal diet.    No smoking       For 24 hours - due to the sedation you received:    Relax and take it easy.    Do NOT make any important or legal decisions.    Do NOT drive or operate machines at home or at work.    Do NOT drink alcohol.    Care of Wrist Puncture Site:      For the first 24 hrs - check the puncture site every 1-2 hours while awake.    It is normal to have soreness at the puncture site and mild tingling in your hand for up to 3 days.    Remove the bandaid after 24 hours. If there is minor oozing, apply another bandaid and remove it after 12 hours.    You may shower tomorrow.  Do NOT take a bath, or use a hot tub or pool for at least 3 days. Do NOT scrub the site. Do not use lotion or powder near the puncture site.           Activity:        For 2 days:     do not use your hand or arm to support your weight (such as rising from a chair)     do not bend your wrist (such as lifting a garage door).    do not lift more than 5 pounds or exercise your arm (such as tennis, golf or bowling).    Do NOT do any heavy activity such as exercise, lifting, or straining.     Bleeding:      If you start bleeding from the site in your wrist, sit down and press firmly on/above the site for 10 minutes.     Once bleeding stops, keep arm still for 2 hours.     Call [facility] as soon as you can.       Call 911 right away if you have heavy bleeding or bleeding that does not stop.      Medicines:        Take your medications, unless your provider tells you not to.      If you have stopped any medicines, check with your provider about when to restart them.    Follow Up Appointments:      Follow up with [facility] Nurse Practitioner at [facility] of patient preference in 7-10 days.    Call the clinic if:      You have a large or growing hard lump  around the site.    The site is red, swollen, hot or tender.    Blood or fluid is draining from the site.    You have chills or a fever greater than 101 F (38 C).    Your arm turns feels numb, cool or changes color.    You have hives, a rash or unusual itching.    Any questions or concerns.          [facility] [place]:    [phone] UMP (7 days a week)

## 2017-10-27 LAB — INTERPRETATION ECG - MUSE: NORMAL

## 2017-10-31 ENCOUNTER — OFFICE VISIT (OUTPATIENT)
Dept: CARDIOLOGY | Facility: CLINIC | Age: 67
End: 2017-10-31
Payer: COMMERCIAL

## 2017-10-31 VITALS
WEIGHT: 189.3 LBS | HEIGHT: 71 IN | DIASTOLIC BLOOD PRESSURE: 70 MMHG | SYSTOLIC BLOOD PRESSURE: 126 MMHG | BODY MASS INDEX: 26.5 KG/M2 | OXYGEN SATURATION: 93 % | HEART RATE: 68 BPM

## 2017-10-31 DIAGNOSIS — I25.10 CORONARY ARTERY DISEASE INVOLVING NATIVE CORONARY ARTERY OF NATIVE HEART WITHOUT ANGINA PECTORIS: Primary | ICD-10-CM

## 2017-10-31 DIAGNOSIS — E78.5 HYPERLIPIDEMIA LDL GOAL <70: ICD-10-CM

## 2017-10-31 PROCEDURE — 99213 OFFICE O/P EST LOW 20 MIN: CPT | Performed by: PHYSICIAN ASSISTANT

## 2017-10-31 NOTE — LETTER
10/31/2017    Sharri Maya Mai, MD  [address] [place] [address]    RE: Dion Carterisen       Dear Colleague,    I had the pleasure of seeing Dion Carterisen in the [facility].    History of Present Illness:   This is a 67-year-old gentleman who presents to cardiology clinic for post angiogram follow-up.  He was recently seen by Dr. Hernandez for further evaluation of recently done exercise nuclear study.  The patient was able to exercise over 11 minutes but developed 4 out of 10 chest discomfort with exercise.  He had preserved ejection fraction.  The stress study was done as patient had achy sensation across his chest as well as across his upper back.  He ultimately was referred for coronary angiogram for more definitive evaluation.  This showed only mild coronary artery disease.  There were no flow-limiting lesions.  He was started on atorvastatin.  Metoprolol which was started previously by Dr. Hernandez was discontinued due to significant bradycardia.  He comes back today for follow-up.    The patient states that he continues to have intermittent chest discomfort but is not as severe as what it was before.  He denies any shortness of breath, PND, orthopnea, palpitations, near-syncope, or syncope.  He has no other questions or concerns at this time.  He wonders if he needs to keep taking atorvastatin.      Assessment and plan:  This is a 67-year-old gentleman who presents to cardiology clinic for post angiogram follow-up.  Due to chest soreness he underwent stress nuclear study and this showed some inferior ischemia.  He also developed chest discomfort during exercise and was ultimately taken to catheterization lab.  This showed only mild coronary artery disease.  I did encourage patient to continue to take atorvastatin.  We will check his lipids in 2 months.  I also encouraged him to continue with aspirin 81 mg daily S he does have coronary artery disease.  His metoprolol was discontinued as  he was noted to be bradycardic during coronary angiogram.  His blood pressure and heart rate are normal.  We talked about  diet and importance of exercise.      Thank you for allowing me to participate in the care of this delightful patient today.        This note was completed in part using Dragon voice recognition software. Although reviewed after completion, some word and grammatical errors may occur.    Orders this Visit:  Orders Placed This Encounter   Procedures     Lipid Profile     ALT     Follow-Up with Cardiologist     Orders Placed This Encounter   Medications     LISINOPRIL PO     Sig: Take 5 mg by mouth daily     Medications Discontinued During This Encounter   Medication Reason     lisinopril (PRINIVIL/ZESTRIL) 5 MG tablet          Encounter Diagnoses   Name Primary?     Hyperlipidemia LDL goal <70      Coronary artery disease involving native coronary artery of native heart without angina pectoris Yes       CURRENT MEDICATIONS:  Current Outpatient Prescriptions   Medication Sig Dispense Refill     LISINOPRIL PO Take 5 mg by mouth daily       atorvastatin (LIPITOR) 20 MG tablet Take 1 tablet (20 mg) by mouth daily 90 tablet 3     MULTIPLE VITAMIN PO Take 1 tablet by mouth daily       Probiotic Product (PROBIOTIC DAILY PO) Take 1 tablet by mouth daily       calcium carbonate (OS-QUIANA 500 MG [place]. CA) 1250 MG tablet Take 1 tablet by mouth daily       MAGNESIUM OXIDE PO Take 200 mg by mouth daily       Ferrous Sulfate (IRON SUPPLEMENT PO) Take 325 mg by mouth daily       B Complex-C (VITAMIN B COMPLEX W/VITAMIN C) TABS tablet Take 1 tablet by mouth daily       Ascorbic Acid (VITAMIN C PO) Take 1,000 mg by mouth daily       omega 3 1000 MG CAPS Take 1 g by mouth daily 90 capsule      cinnamon 500 MG TABS        RANITIDINE HCL PO Take 150 mg by mouth daily       NAPROXEN PO Take 250 mg by mouth 2 times daily (with meals)       aspirin 81 MG tablet Take 1 tablet (81 mg) by mouth daily (Patient not  taking: Reported on 10/31/2017) 30 tablet      nitroGLYcerin (NITROSTAT) 0.4 MG sublingual tablet For chest pain place 1 tablet under the tongue every 5 minutes for 3 doses. If symptoms persist 5 minutes after 1st dose call 911. (Patient not taking: Reported on 10/10/2017) 25 tablet 0       ALLERGIES   No Known Allergies    PAST MEDICAL HISTORY:  Past Medical History:   Diagnosis Date     NO ACTIVE PROBLEMS        PAST SURGICAL HISTORY:  Past Surgical History:   Procedure Laterality Date     AMPUTATION FINGER/THUMB       NO HISTORY OF SURGERY         FAMILY HISTORY:  Family History   Problem Relation Age of Onset     Coronary Artery Disease Mother 55     Hypertension Mother      Other Cancer Father      lung cancer     Unknown/Adopted Maternal Grandmother      Unknown/Adopted Maternal Grandfather      Unknown/Adopted Paternal Grandmother      Unknown/Adopted Paternal Grandfather      Substance Abuse Brother      alcoholism     Other Cancer Sister      throat cancer     Seizure Disorder Brother      Hypertension Sister        SOCIAL HISTORY:  Social History     Social History     Marital status:      Spouse name: N/A     Number of children: N/A     Years of education: N/A     Social History Main Topics     Smoking status: Former Smoker     Quit date: 9/14/1978     Smokeless tobacco: Never Used     Alcohol use No     Drug use: No     Sexual activity: Not Currently      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     Other Topics Concern     Not on file     Social History Narrative       Review of Systems:  Skin:  Negative     Eyes:  Positive for glasses  ENT:  Negative    Respiratory:  Negative    Cardiovascular:  Negative for;palpitations;edema;lightheadedness;dizziness;chest pain Positive for;fatigue  Gastroenterology: Positive for reflux  Genitourinary:  Negative    Musculoskeletal:  Negative    Neurologic:  Negative    Psychiatric:  Negative    Heme/Lymph/Imm:  Negative    Endocrine:  Negative   "    Physical Exam:  Vitals: /70 (BP Location: Right arm, Patient Position: Fowlers, Cuff Size: Adult Large)  Pulse 68  Ht 1.803 m (5' 11\")  Wt 85.9 kg (189 lb 4.8 oz)  SpO2 93%  BMI 26.4 kg/m2   Please refer to dictation for physical exam    Recent Lab Results:  LIPID RESULTS:  Lab Results   Component Value Date    CHOL 177 09/14/2017    HDL 55 09/14/2017    LDL 98 09/14/2017    TRIG 121 09/14/2017       LIVER ENZYME RESULTS:  Lab Results   Component Value Date    AST 29 09/16/2017    ALT 40 09/16/2017       CBC RESULTS:  Lab Results   Component Value Date    WBC 6.5 10/20/2017    RBC 4.51 10/20/2017    HGB 14.2 10/20/2017    HCT 40.1 10/20/2017    MCV 89 10/20/2017    MCH 31.5 10/20/2017    MCHC 35.4 10/20/2017    RDW 13.3 10/20/2017     10/20/2017       BMP RESULTS:  Lab Results   Component Value Date     10/20/2017    POTASSIUM 3.8 10/20/2017    CHLORIDE 112 (H) 10/20/2017    CO2 26 10/20/2017    ANIONGAP 6 10/20/2017    GLC 86 10/20/2017    BUN 25 10/20/2017    CR 0.95 10/20/2017    GFRESTIMATED 79 10/20/2017    GFRESTBLACK >90 10/20/2017    QUIANA 8.5 10/20/2017        A1C RESULTS:  No results found for: A1C    INR RESULTS:  Lab Results   Component Value Date    INR 0.86 10/20/2017     Thank you for allowing me to participate in the care of your patient.    Sincerely,     Shanika Mckenzie PA-C     [facility]    "

## 2017-10-31 NOTE — PROGRESS NOTES
History of Present Illness:   This is a 67-year-old gentleman who presents to cardiology clinic for post angiogram follow-up.  He was recently seen by Dr. Hernandez for further evaluation of recently done exercise nuclear study.  The patient was able to exercise over 11 minutes but developed 4 out of 10 chest discomfort with exercise.  He had preserved ejection fraction.  The stress study was done as patient had achy sensation across his chest as well as across his upper back.  He ultimately was referred for coronary angiogram for more definitive evaluation.  This showed only mild coronary artery disease.  There were no flow-limiting lesions.  He was started on atorvastatin.  Metoprolol which was started previously by Dr. Hernandez was discontinued due to significant bradycardia.  He comes back today for follow-up.    The patient states that he continues to have intermittent chest discomfort but is not as severe as what it was before.  He denies any shortness of breath, PND, orthopnea, palpitations, near-syncope, or syncope.  He has no other questions or concerns at this time.  He wonders if he needs to keep taking atorvastatin.      Assessment and plan:  This is a 67-year-old gentleman who presents to cardiology clinic for post angiogram follow-up.  Due to chest soreness he underwent stress nuclear study and this showed some inferior ischemia.  He also developed chest discomfort during exercise and was ultimately taken to catheterization lab.  This showed only mild coronary artery disease.  I did encourage patient to continue to take atorvastatin.  We will check his lipids in 2 months.  I also encouraged him to continue with aspirin 81 mg daily S he does have coronary artery disease.  His metoprolol was discontinued as he was noted to be bradycardic during coronary angiogram.  His blood pressure and heart rate are normal.  We talked about  diet and importance of exercise.      Thank you for allowing me to participate  in the care of this delightful patient today.        This note was completed in part using Dragon voice recognition software. Although reviewed after completion, some word and grammatical errors may occur.    Orders this Visit:  Orders Placed This Encounter   Procedures     Lipid Profile     ALT     Follow-Up with Cardiologist     Orders Placed This Encounter   Medications     LISINOPRIL PO     Sig: Take 5 mg by mouth daily     Medications Discontinued During This Encounter   Medication Reason     lisinopril (PRINIVIL/ZESTRIL) 5 MG tablet          Encounter Diagnoses   Name Primary?     Hyperlipidemia LDL goal <70      Coronary artery disease involving native coronary artery of native heart without angina pectoris Yes       CURRENT MEDICATIONS:  Current Outpatient Prescriptions   Medication Sig Dispense Refill     LISINOPRIL PO Take 5 mg by mouth daily       atorvastatin (LIPITOR) 20 MG tablet Take 1 tablet (20 mg) by mouth daily 90 tablet 3     MULTIPLE VITAMIN PO Take 1 tablet by mouth daily       Probiotic Product (PROBIOTIC DAILY PO) Take 1 tablet by mouth daily       calcium carbonate (OS-QUIANA 500 MG [place]. CA) 1250 MG tablet Take 1 tablet by mouth daily       MAGNESIUM OXIDE PO Take 200 mg by mouth daily       Ferrous Sulfate (IRON SUPPLEMENT PO) Take 325 mg by mouth daily       B Complex-C (VITAMIN B COMPLEX W/VITAMIN C) TABS tablet Take 1 tablet by mouth daily       Ascorbic Acid (VITAMIN C PO) Take 1,000 mg by mouth daily       omega 3 1000 MG CAPS Take 1 g by mouth daily 90 capsule      cinnamon 500 MG TABS        RANITIDINE HCL PO Take 150 mg by mouth daily       NAPROXEN PO Take 250 mg by mouth 2 times daily (with meals)       aspirin 81 MG tablet Take 1 tablet (81 mg) by mouth daily (Patient not taking: Reported on 10/31/2017) 30 tablet      nitroGLYcerin (NITROSTAT) 0.4 MG sublingual tablet For chest pain place 1 tablet under the tongue every 5 minutes for 3 doses. If symptoms persist 5 minutes after 1st  "dose call 911. (Patient not taking: Reported on 10/10/2017) 25 tablet 0       ALLERGIES   No Known Allergies    PAST MEDICAL HISTORY:  Past Medical History:   Diagnosis Date     NO ACTIVE PROBLEMS        PAST SURGICAL HISTORY:  Past Surgical History:   Procedure Laterality Date     AMPUTATION FINGER/THUMB       NO HISTORY OF SURGERY         FAMILY HISTORY:  Family History   Problem Relation Age of Onset     Coronary Artery Disease Mother 55     Hypertension Mother      Other Cancer Father      lung cancer     Unknown/Adopted Maternal Grandmother      Unknown/Adopted Maternal Grandfather      Unknown/Adopted Paternal Grandmother      Unknown/Adopted Paternal Grandfather      Substance Abuse Brother      alcoholism     Other Cancer Sister      throat cancer     Seizure Disorder Brother      Hypertension Sister        SOCIAL HISTORY:  Social History     Social History     Marital status:      Spouse name: N/A     Number of children: N/A     Years of education: N/A     Social History Main Topics     Smoking status: Former Smoker     Quit date: 9/14/1978     Smokeless tobacco: Never Used     Alcohol use No     Drug use: No     Sexual activity: Not Currently      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     Other Topics Concern     Not on file     Social History Narrative       Review of Systems:  Skin:  Negative     Eyes:  Positive for glasses  ENT:  Negative    Respiratory:  Negative    Cardiovascular:  Negative for;palpitations;edema;lightheadedness;dizziness;chest pain Positive for;fatigue  Gastroenterology: Positive for reflux  Genitourinary:  Negative    Musculoskeletal:  Negative    Neurologic:  Negative    Psychiatric:  Negative    Heme/Lymph/Imm:  Negative    Endocrine:  Negative      Physical Exam:  Vitals: /70 (BP Location: Right arm, Patient Position: Fowlers, Cuff Size: Adult Large)  Pulse 68  Ht 1.803 m (5' 11\")  Wt 85.9 kg (189 lb 4.8 oz)  SpO2 93%  BMI 26.4 kg/m2   Please refer to " dictation for physical exam    Recent Lab Results:  LIPID RESULTS:  Lab Results   Component Value Date    CHOL 177 09/14/2017    HDL 55 09/14/2017    LDL 98 09/14/2017    TRIG 121 09/14/2017       LIVER ENZYME RESULTS:  Lab Results   Component Value Date    AST 29 09/16/2017    ALT 40 09/16/2017       CBC RESULTS:  Lab Results   Component Value Date    WBC 6.5 10/20/2017    RBC 4.51 10/20/2017    HGB 14.2 10/20/2017    HCT 40.1 10/20/2017    MCV 89 10/20/2017    MCH 31.5 10/20/2017    MCHC 35.4 10/20/2017    RDW 13.3 10/20/2017     10/20/2017       BMP RESULTS:  Lab Results   Component Value Date     10/20/2017    POTASSIUM 3.8 10/20/2017    CHLORIDE 112 (H) 10/20/2017    CO2 26 10/20/2017    ANIONGAP 6 10/20/2017    GLC 86 10/20/2017    BUN 25 10/20/2017    CR 0.95 10/20/2017    GFRESTIMATED 79 10/20/2017    GFRESTBLACK >90 10/20/2017    QUIANA 8.5 10/20/2017        A1C RESULTS:  No results found for: A1C    INR RESULTS:  Lab Results   Component Value Date    INR 0.86 10/20/2017           Shanika Mckenzie PA-C   October 31, 2017

## 2017-10-31 NOTE — PATIENT INSTRUCTIONS
Today's Plan:   1) Continue with Atorvastatin- this is a cholesterol medication.   2) Your angiogram showed that you have some cholesterol build up in your heart arteries but nothing too significant to stent.   3) We should check your cholesterol numbers in 2 months with fasting blood work.   4) I recommend that you continue with Aspirin. Talk with your primary if you can take something other than naproxen to minimize too much thinning of your blood.     If you have questions or concerns please call my nurse at [phone].     Scheduling phone number: [phone]  Reminder: Please bring in all current medications, over the counter supplements and vitamin bottles to your next appointment.    It was a pleasure seeing you today!     Shanika Mckenzie  10/31/2017

## 2017-10-31 NOTE — MR AVS SNAPSHOT
After Visit Summary   10/31/2017    Dion López    MRN: [id number]           Patient Information     Date Of Birth          1950        Visit Information        Provider Department      10/31/2017 2:15 PM Shanika Mckenzie PA-C [facility]        Today's Diagnoses     Coronary artery disease involving native coronary artery of native heart without angina pectoris    -  1    Hyperlipidemia LDL goal <70          Care Instructions    Today's Plan:   1) Continue with Atorvastatin- this is a cholesterol medication.   2) Your angiogram showed that you have some cholesterol build up in your heart arteries but nothing too significant to stent.   3) We should check your cholesterol numbers in 2 months with fasting blood work.   4) I recommend that you continue with Aspirin. Talk with your primary if you can take something other than naproxen to minimize too much thinning of your blood.     If you have questions or concerns please call my nurse at [phone].     Scheduling phone number: [phone]  Reminder: Please bring in all current medications, over the counter supplements and vitamin bottles to your next appointment.    It was a pleasure seeing you today!     Shanika Mckenzie  10/31/2017              Follow-ups after your visit        Additional Services     Follow-Up with Cardiologist                 Future tests that were ordered for you today     Open Future Orders        Priority Expected Expires Ordered    Follow-Up with Cardiologist Routine 10/31/2018 11/1/2018 10/31/2017    Lipid Profile Routine 1/29/2018 10/31/2018 10/31/2017    ALT Routine 1/29/2018 10/31/2018 10/31/2017            Who to contact     If you have questions or need follow up information about today's clinic visit or your schedule please contact [facility] directly at [phone].  Normal or non-critical lab and imaging results  "will be communicated to you by MyChart, letter or phone within 4 business days after the clinic has received the results. If you do not hear from us within 7 days, please contact the clinic through [employer] or phone. If you have a critical or abnormal lab result, we will notify you by phone as soon as possible.  Submit refill requests through [employer] or call your pharmacy and they will forward the refill request to us. Please allow 3 business days for your refill to be completed.          Additional Information About Your Visit        [employer] Information     [employer] lets you send messages to your doctor, view your test results, renew your prescriptions, schedule appointments and more. To sign up, go to [email or web address] . Click on \"Log in\" on the left side of the screen, which will take you to the Welcome page. Then click on \"Sign up Now\" on the right side of the page.     You will be asked to enter the access code listed below, as well as some personal information. Please follow the directions to create your username and password.     Your access code is: [email or web address]  Expires: 2017  4:30 PM     Your access code will  in 90 days. If you need help or a new code, please call your [facility] clinic or [phone].        Care EveryWhere ID     This is your Care EveryWhere ID. This could be used by other organizations to access your [facility] medical records  [id number]        Your Vitals Were     Pulse Height Pulse Oximetry BMI (Body Mass Index)          68 1.803 m (5' 11\") 93% 26.4 kg/m2         Blood Pressure from Last 3 Encounters:   10/31/17 126/70   10/20/17 116/75   10/10/17 122/72    Weight from Last 3 Encounters:   10/31/17 85.9 kg (189 lb 4.8 oz)   10/20/17 84.6 kg (186 lb 9.6 oz)   10/10/17 85.2 kg (187 lb 14.4 oz)                 Today's Medication Changes          These changes are accurate as of: 10/31/17  2:34 PM.  If you have any questions, ask your nurse or doctor.               These " medicines have changed or have updated prescriptions.        Dose/Directions    LISINOPRIL PO   This may have changed:  Another medication with the same name was removed. Continue taking this medication, and follow the directions you see here.        Dose:  5 mg   Take 5 mg by mouth daily   Refills:  0                Primary Care Provider Office Phone # Fax #    Sharri Maya Mai, [phone] [address] [place] [address]        Equal Access to Services     SAIRA ROCHA : Hadii aad ku hadasho Soomaali, waaxda luqadaha, qaybta kaalmada adeegyada, waxay idiin hayaan adeeg kharash lajerri . So [facility] [phone].    ATENCIÓN: Si habla español, tiene a garcia disposición servicios gratuitos de asistencia lingüística. Llame al [phone].    We comply with applicable federal civil rights laws and [place] laws. We do not discriminate on the basis of race, color, national origin, age, disability, sex, sexual orientation, or gender identity.            Thank you!     Thank you for choosing [facility]  for your care. Our goal is always to provide you with excellent care. Hearing back from our patients is one way we can continue to improve our services. Please take a few minutes to complete the written survey that you may receive in the mail after your visit with us. Thank you!             Your Updated Medication List - Protect others around you: Learn how to safely use, store and throw away your medicines at www.disposemymeds.org.          This list is accurate as of: 10/31/17  2:34 PM.  Always use your most recent med list.                   Brand Name Dispense Instructions for use Diagnosis    aspirin 81 MG tablet     30 tablet    Take 1 tablet (81 mg) by mouth daily    Atypical chest pain       atorvastatin 20 MG tablet    LIPITOR    90 tablet    Take 1 tablet (20 mg) by mouth daily    Abnormal cardiovascular stress test       calcium carbonate 1250 MG  tablet    OS-QUIANA 500 mg [place]. Ca     Take 1 tablet by mouth daily        cinnamon 500 MG Tabs           IRON SUPPLEMENT PO      Take 325 mg by mouth daily        LISINOPRIL PO      Take 5 mg by mouth daily        MAGNESIUM OXIDE PO      Take 200 mg by mouth daily        MULTIPLE VITAMIN PO      Take 1 tablet by mouth daily        NAPROXEN PO      Take 250 mg by mouth 2 times daily (with meals)        nitroGLYcerin 0.4 MG sublingual tablet    NITROSTAT    25 tablet    For chest pain place 1 tablet under the tongue every 5 minutes for 3 doses. If symptoms persist 5 minutes after 1st dose call 911.    Atypical chest pain       omega 3 1000 MG Caps     90 capsule    Take 1 g by mouth daily        PROBIOTIC DAILY PO      Take 1 tablet by mouth daily        RANITIDINE HCL PO      Take 150 mg by mouth daily        vitamin b complex w/vitamin C Tabs tablet      Take 1 tablet by mouth daily        VITAMIN C PO      Take 1,000 mg by mouth daily

## 2017-10-31 NOTE — LETTER
10/31/2017      RE: Dion López  [address] [facility] [address]       Dear Colleague,    Thank you for the opportunity to participate in the care of your patient, Dion López, at the [facility] at [facility]. Please see a copy of my visit note below.    History of Present Illness:   This is a 67-year-old gentleman who presents to cardiology clinic for post angiogram follow-up.  He was recently seen by Dr. Hernandez for further evaluation of recently done exercise nuclear study.  The patient was able to exercise over 11 minutes but developed 4 out of 10 chest discomfort with exercise.  He had preserved ejection fraction.  The stress study was done as patient had achy sensation across his chest as well as across his upper back.  He ultimately was referred for coronary angiogram for more definitive evaluation.  This showed only mild coronary artery disease.  There were no flow-limiting lesions.  He was started on atorvastatin.  Metoprolol which was started previously by Dr. Hernandez was discontinued due to significant bradycardia.  He comes back today for follow-up.    The patient states that he continues to have intermittent chest discomfort but is not as severe as what it was before.  He denies any shortness of breath, PND, orthopnea, palpitations, near-syncope, or syncope.  He has no other questions or concerns at this time.  He wonders if he needs to keep taking atorvastatin.      Assessment and plan:  This is a 67-year-old gentleman who presents to cardiology clinic for post angiogram follow-up.  Due to chest soreness he underwent stress nuclear study and this showed some inferior ischemia.  He also developed chest discomfort during exercise and was ultimately taken to catheterization lab.  This showed only mild coronary artery disease.  I did encourage patient to continue to take atorvastatin.  We will check his lipids in 2 months.   I also encouraged him to continue with aspirin 81 mg daily S he does have coronary artery disease.  His metoprolol was discontinued as he was noted to be bradycardic during coronary angiogram.  His blood pressure and heart rate are normal.  We talked about  diet and importance of exercise.      Thank you for allowing me to participate in the care of this delightful patient today.        This note was completed in part using Dragon voice recognition software. Although reviewed after completion, some word and grammatical errors may occur.    Orders this Visit:  Orders Placed This Encounter   Procedures     Lipid Profile     ALT     Follow-Up with Cardiologist     Orders Placed This Encounter   Medications     LISINOPRIL PO     Sig: Take 5 mg by mouth daily     Medications Discontinued During This Encounter   Medication Reason     lisinopril (PRINIVIL/ZESTRIL) 5 MG tablet          Encounter Diagnoses   Name Primary?     Hyperlipidemia LDL goal <70      Coronary artery disease involving native coronary artery of native heart without angina pectoris Yes       CURRENT MEDICATIONS:  Current Outpatient Prescriptions   Medication Sig Dispense Refill     LISINOPRIL PO Take 5 mg by mouth daily       atorvastatin (LIPITOR) 20 MG tablet Take 1 tablet (20 mg) by mouth daily 90 tablet 3     MULTIPLE VITAMIN PO Take 1 tablet by mouth daily       Probiotic Product (PROBIOTIC DAILY PO) Take 1 tablet by mouth daily       calcium carbonate (OS-QUIANA 500 MG [place]. [place]) 1250 MG tablet Take 1 tablet by mouth daily       MAGNESIUM OXIDE PO Take 200 mg by mouth daily       Ferrous Sulfate (IRON SUPPLEMENT PO) Take 325 mg by mouth daily       B Complex-C (VITAMIN B COMPLEX W/VITAMIN C) TABS tablet Take 1 tablet by mouth daily       Ascorbic Acid (VITAMIN C PO) Take 1,000 mg by mouth daily       omega 3 1000 MG CAPS Take 1 g by mouth daily 90 capsule      cinnamon 500 MG TABS        RANITIDINE HCL PO Take 150 mg by mouth daily        NAPROXEN PO Take 250 mg by mouth 2 times daily (with meals)       aspirin 81 MG tablet Take 1 tablet (81 mg) by mouth daily (Patient not taking: Reported on 10/31/2017) 30 tablet      nitroGLYcerin (NITROSTAT) 0.4 MG sublingual tablet For chest pain place 1 tablet under the tongue every 5 minutes for 3 doses. If symptoms persist 5 minutes after 1st dose call 911. (Patient not taking: Reported on 10/10/2017) 25 tablet 0       ALLERGIES   No Known Allergies    PAST MEDICAL HISTORY:  Past Medical History:   Diagnosis Date     NO ACTIVE PROBLEMS        PAST SURGICAL HISTORY:  Past Surgical History:   Procedure Laterality Date     AMPUTATION FINGER/THUMB       NO HISTORY OF SURGERY         FAMILY HISTORY:  Family History   Problem Relation Age of Onset     Coronary Artery Disease Mother 55     Hypertension Mother      Other Cancer Father      lung cancer     Unknown/Adopted Maternal Grandmother      Unknown/Adopted Maternal Grandfather      Unknown/Adopted Paternal Grandmother      Unknown/Adopted Paternal Grandfather      Substance Abuse Brother      alcoholism     Other Cancer Sister      throat cancer     Seizure Disorder Brother      Hypertension Sister        SOCIAL HISTORY:  Social History     Social History     Marital status:      Spouse name: N/A     Number of children: N/A     Years of education: N/A     Social History Main Topics     Smoking status: Former Smoker     Quit date: 9/14/1978     Smokeless tobacco: Never Used     Alcohol use No     Drug use: No     Sexual activity: Not Currently      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     Other Topics Concern     Not on file     Social History Narrative       Review of Systems:  Skin:  Negative     Eyes:  Positive for glasses  ENT:  Negative    Respiratory:  Negative    Cardiovascular:  Negative for;palpitations;edema;lightheadedness;dizziness;chest pain Positive for;fatigue  Gastroenterology: Positive for reflux  Genitourinary:  Negative   "  Musculoskeletal:  Negative    Neurologic:  Negative    Psychiatric:  Negative    Heme/Lymph/Imm:  Negative    Endocrine:  Negative      Physical Exam:  Vitals: /70 (BP Location: Right arm, Patient Position: Fowlers, Cuff Size: Adult Large)  Pulse 68  Ht 1.803 m (5' 11\")  Wt 85.9 kg (189 lb 4.8 oz)  SpO2 93%  BMI 26.4 kg/m2   Please refer to dictation for physical exam    Recent Lab Results:  LIPID RESULTS:  Lab Results   Component Value Date    CHOL 177 09/14/2017    HDL 55 09/14/2017    LDL 98 09/14/2017    TRIG 121 09/14/2017       LIVER ENZYME RESULTS:  Lab Results   Component Value Date    AST 29 09/16/2017    ALT 40 09/16/2017       CBC RESULTS:  Lab Results   Component Value Date    WBC 6.5 10/20/2017    RBC 4.51 10/20/2017    HGB 14.2 10/20/2017    HCT 40.1 10/20/2017    MCV 89 10/20/2017    MCH 31.5 10/20/2017    MCHC 35.4 10/20/2017    RDW 13.3 10/20/2017     10/20/2017       BMP RESULTS:  Lab Results   Component Value Date     10/20/2017    POTASSIUM 3.8 10/20/2017    CHLORIDE 112 (H) 10/20/2017    CO2 26 10/20/2017    ANIONGAP 6 10/20/2017    GLC 86 10/20/2017    BUN 25 10/20/2017    CR 0.95 10/20/2017    GFRESTIMATED 79 10/20/2017    GFRESTBLACK >90 10/20/2017    QUIANA 8.5 10/20/2017        A1C RESULTS:  No results found for: A1C    INR RESULTS:  Lab Results   Component Value Date    INR 0.86 10/20/2017           Shanika Mckenzie PA-C   October 31, 2017       Please do not hesitate to contact me if you have any questions/concerns.     Sincerely,     Shanika Mckenzie PA-C    "

## 2017-11-24 ENCOUNTER — TELEPHONE (OUTPATIENT)
Dept: FAMILY MEDICINE | Facility: CLINIC | Age: 67
End: 2017-11-24

## 2017-11-24 DIAGNOSIS — I10 ESSENTIAL HYPERTENSION: Primary | ICD-10-CM

## 2017-11-24 PROBLEM — S99.921A FOOT INJURY, RIGHT, INITIAL ENCOUNTER: Status: RESOLVED | Noted: 2017-02-02 | Resolved: 2017-11-24

## 2017-11-24 RX ORDER — LISINOPRIL 5 MG/1
5 TABLET ORAL DAILY
Qty: 30 TABLET | Refills: 0 | Status: SHIPPED | OUTPATIENT
Start: 2017-11-24 | End: 2018-01-29

## 2017-11-24 NOTE — TELEPHONE ENCOUNTER
Ran out of Lisinopril. Did not follow up as recommended.  1 month refill - must follow up before med runs out.

## 2017-11-25 ENCOUNTER — HOSPITAL ENCOUNTER (EMERGENCY)
Facility: CLINIC | Age: 67
Discharge: HOME | End: 2017-11-25
Attending: PHYSICIAN ASSISTANT | Admitting: PHYSICIAN ASSISTANT
Payer: COMMERCIAL

## 2017-11-25 VITALS
HEART RATE: 82 BPM | BODY MASS INDEX: 26.41 KG/M2 | WEIGHT: 189.38 LBS | OXYGEN SATURATION: 96 % | TEMPERATURE: 96.8 F | RESPIRATION RATE: 16 BRPM | SYSTOLIC BLOOD PRESSURE: 132 MMHG | DIASTOLIC BLOOD PRESSURE: 78 MMHG

## 2017-11-25 DIAGNOSIS — R11.0 NAUSEA: ICD-10-CM

## 2017-11-25 DIAGNOSIS — R52 BODY ACHES: ICD-10-CM

## 2017-11-25 LAB
ALBUMIN SERPL-MCNC: 3.7 G/DL (ref 3.4–5)
ALP SERPL-CCNC: 119 U/L (ref 40–150)
ALT SERPL W P-5'-P-CCNC: 44 U/L (ref 0–70)
ANION GAP SERPL CALCULATED.3IONS-SCNC: 10 MMOL/L (ref 3–14)
AST SERPL W P-5'-P-CCNC: 28 U/L (ref 0–45)
BASOPHILS # BLD AUTO: 0 10E9/L (ref 0–0.2)
BASOPHILS NFR BLD AUTO: 0.2 %
BILIRUB SERPL-MCNC: 0.6 MG/DL (ref 0.2–1.3)
BUN SERPL-MCNC: 23 MG/DL (ref 7–30)
CALCIUM SERPL-MCNC: 8.4 MG/DL (ref 8.5–10.1)
CHLORIDE SERPL-SCNC: 109 MMOL/L (ref 94–109)
CO2 SERPL-SCNC: 25 MMOL/L (ref 20–32)
CREAT SERPL-MCNC: 0.83 MG/DL (ref 0.66–1.25)
DIFFERENTIAL METHOD BLD: ABNORMAL
EOSINOPHIL # BLD AUTO: 0 10E9/L (ref 0–0.7)
EOSINOPHIL NFR BLD AUTO: 0.1 %
ERYTHROCYTE [DISTWIDTH] IN BLOOD BY AUTOMATED COUNT: 13.6 % (ref 10–15)
FLUAV+FLUBV AG SPEC QL: NEGATIVE
FLUAV+FLUBV AG SPEC QL: NEGATIVE
GFR SERPL CREATININE-BSD FRML MDRD: >90 ML/MIN/1.7M2
GLUCOSE SERPL-MCNC: 110 MG/DL (ref 70–99)
HCT VFR BLD AUTO: 43.8 % (ref 40–53)
HGB BLD-MCNC: 14.9 G/DL (ref 13.3–17.7)
IMM GRANULOCYTES # BLD: 0 10E9/L (ref 0–0.4)
IMM GRANULOCYTES NFR BLD: 0.2 %
LIPASE SERPL-CCNC: 115 U/L (ref 73–393)
LYMPHOCYTES # BLD AUTO: 0.3 10E9/L (ref 0.8–5.3)
LYMPHOCYTES NFR BLD AUTO: 2.6 %
MCH RBC QN AUTO: 30.6 PG (ref 26.5–33)
MCHC RBC AUTO-ENTMCNC: 34 G/DL (ref 31.5–36.5)
MCV RBC AUTO: 90 FL (ref 78–100)
MONOCYTES # BLD AUTO: 0.5 10E9/L (ref 0–1.3)
MONOCYTES NFR BLD AUTO: 3.7 %
NEUTROPHILS # BLD AUTO: 12.1 10E9/L (ref 1.6–8.3)
NEUTROPHILS NFR BLD AUTO: 93.2 %
PLATELET # BLD AUTO: 243 10E9/L (ref 150–450)
POTASSIUM SERPL-SCNC: 4 MMOL/L (ref 3.4–5.3)
PROT SERPL-MCNC: 7.4 G/DL (ref 6.8–8.8)
RBC # BLD AUTO: 4.87 10E12/L (ref 4.4–5.9)
SODIUM SERPL-SCNC: 144 MMOL/L (ref 133–144)
SPECIMEN SOURCE: NORMAL
WBC # BLD AUTO: 13 10E9/L (ref 4–11)

## 2017-11-25 PROCEDURE — 83690 ASSAY OF LIPASE: CPT | Performed by: PHYSICIAN ASSISTANT

## 2017-11-25 PROCEDURE — 36415 COLL VENOUS BLD VENIPUNCTURE: CPT | Performed by: PHYSICIAN ASSISTANT

## 2017-11-25 PROCEDURE — 99283 EMERGENCY DEPT VISIT LOW MDM: CPT | Performed by: PHYSICIAN ASSISTANT

## 2017-11-25 PROCEDURE — 85025 COMPLETE CBC W/AUTO DIFF WBC: CPT | Performed by: PHYSICIAN ASSISTANT

## 2017-11-25 PROCEDURE — 80053 COMPREHEN METABOLIC PANEL: CPT | Performed by: PHYSICIAN ASSISTANT

## 2017-11-25 PROCEDURE — 25000132 ZZH RX MED GY IP 250 OP 250 PS 637: Performed by: PHYSICIAN ASSISTANT

## 2017-11-25 PROCEDURE — 87804 INFLUENZA ASSAY W/OPTIC: CPT | Performed by: PHYSICIAN ASSISTANT

## 2017-11-25 PROCEDURE — 25000125 ZZHC RX 250: Performed by: PHYSICIAN ASSISTANT

## 2017-11-25 PROCEDURE — 99284 EMERGENCY DEPT VISIT MOD MDM: CPT | Mod: Z6 | Performed by: PHYSICIAN ASSISTANT

## 2017-11-25 RX ORDER — ONDANSETRON 4 MG/1
4 TABLET, ORALLY DISINTEGRATING ORAL ONCE
Status: COMPLETED | OUTPATIENT
Start: 2017-11-25 | End: 2017-11-25

## 2017-11-25 RX ORDER — ONDANSETRON 4 MG/1
4 TABLET, ORALLY DISINTEGRATING ORAL EVERY 8 HOURS PRN
Qty: 10 TABLET | Refills: 0 | Status: SHIPPED | OUTPATIENT
Start: 2017-11-25 | End: 2017-11-28

## 2017-11-25 RX ORDER — ACETAMINOPHEN 325 MG/1
975 TABLET ORAL ONCE
Status: COMPLETED | OUTPATIENT
Start: 2017-11-25 | End: 2017-11-25

## 2017-11-25 RX ADMIN — ACETAMINOPHEN 975 MG: 325 TABLET ORAL at 15:53

## 2017-11-25 RX ADMIN — ONDANSETRON 4 MG: 4 TABLET, ORALLY DISINTEGRATING ORAL at 15:53

## 2017-11-25 ASSESSMENT — ENCOUNTER SYMPTOMS
COUGH: 0
CHILLS: 0
SHORTNESS OF BREATH: 0
DIARRHEA: 0
NUMBNESS: 0
ABDOMINAL PAIN: 1
HEADACHES: 1
FEVER: 0
DYSURIA: 0
VOMITING: 0
NAUSEA: 1
WEAKNESS: 0
CONSTIPATION: 1

## 2017-11-25 NOTE — DISCHARGE INSTRUCTIONS
At this time, the cause of your symptoms is not clear but it does not appear serious. Encourage you to rest over the next few days and drink plenty of fluids.  Use Tylenol as needed to manage body aches or headache.  The Zofran is intended for recurrent nausea.  Please follow up next week in the clinic if symptoms are not improving.  Return to the emergency department as discussed for worsening symptoms.    Thank you for choosing [facility]'s Emergency Department. It was a pleasure taking care of you today. If you have any questions, please call [phone].    Zhane Ponce PA-C    Home care  Follow these guidelines for taking care of yourself at home:    If symptoms are severe, rest at home for the first 2 to 3 days.    Stay away from cigarette smoke - both your smoke and the smoke from others.    You may use over-the-counter acetaminophen or ibuprofen for fever, muscle aching, and headache, unless another medicine was prescribed for this. If you have chronic liver or kidney disease or ever had a stomach ulcer or GI bleeding, talk with your doctor before using these medicines. No one who is younger than 18 and ill with a fever should take aspirin. It may cause severe disease or death.    Your appetite may be poor, so a light diet is fine. Avoid dehydration by drinking 8 to 12 8-ounce glasses of fluids each day. This may include water; orange juice; lemonade; apple, grape, and cranberry juice; clear fruit drinks; electrolyte replacement and sports drinks; and decaffeinated teas and coffee. If you have been diagnosed with a kidney disease, ask your doctor how much and what types of fluids you should drink to prevent dehydration. If you have kidney disease, drinking too much fluid can cause it build up in the your body and be dangerous to your health.    Over-the-counter remedies won't shorten the length of the illness but may be helpful for cough, sore throat; and nasal and sinus congestion. Don't use  decongestants if you have high blood pressure.  Follow-up care  Follow up with your healthcare provider if you do not improve over the next week.  Call 911  Get emergency medical care if any of the following occur:    Convulsion    Feeling weak, dizzy, or like you are going to faint    Chest pain, shortness of breath, wheezing, or difficulty breathing  When to seek medical advice  Call your healthcare provider right away if any of these occur:    Cough with lots of colored sputum (mucus) or blood in your sputum    Chest pain, shortness of breath, wheezing, or difficulty breathing    Severe headache; face, neck, or ear pain    Severe, constant pain in the lower right side of your belly (abdominal)    Continued vomiting (can t keep liquids down)    Frequent diarrhea (more than 5 times a day); blood (red or black color) or mucus in diarrhea    Feeling weak, dizzy, or like you are going to faint    Extreme thirst    Fever of 100.4 F (38 C) or higher, or as directed by your healthcare provider

## 2017-11-25 NOTE — ED PROVIDER NOTES
"  History     Chief Complaint   Patient presents with     Nausea     HPI  Dion López is a 67 year old male who presents to the emergency department complaining of nausea and body aches.  Around 2 AM this morning he woke up feeling poorly.  He said his \"whole body hurt\" and he felt nauseated. Also has mild headache. He has been sleeping on and off since then.  He has not taken anything for these symptoms.  He denies associated fever or chills, vomiting or diarrhea.  He notes occasional constipation for which he takes stool softeners twice daily.  Last bowel movement was yesterday and was somewhat harder than usual.  He states his abdomen hurts right around his belly button.  Denies dysuria.  Denies URI symptoms.    Problem List:    Patient Active Problem List    Diagnosis Date Noted     Essential hypertension 11/24/2017     Priority: Medium     Counseling regarding advanced directives 10/14/2016     Priority: Medium        Past Medical History:    Past Medical History:   Diagnosis Date     NO ACTIVE PROBLEMS        Past Surgical History:    Past Surgical History:   Procedure Laterality Date     AMPUTATION FINGER/THUMB       NO HISTORY OF SURGERY         Family History:    Family History   Problem Relation Age of Onset     Coronary Artery Disease Mother 55     Hypertension Mother      Other Cancer Father      lung cancer     Unknown/Adopted Maternal Grandmother      Unknown/Adopted Maternal Grandfather      Unknown/Adopted Paternal Grandmother      Unknown/Adopted Paternal Grandfather      Substance Abuse Brother      alcoholism     Other Cancer Sister      throat cancer     Seizure Disorder Brother      Hypertension Sister        Social History:  Marital Status:   [5]  Social History   Substance Use Topics     Smoking status: Former Smoker     Quit date: 9/14/1978     Smokeless tobacco: Never Used     Alcohol use No        Medications:      ondansetron (ZOFRAN ODT) 4 MG ODT tab   lisinopril " (PRINIVIL/ZESTRIL) 5 MG tablet   atorvastatin (LIPITOR) 20 MG tablet   MULTIPLE VITAMIN PO   Probiotic Product (PROBIOTIC DAILY PO)   calcium carbonate (OS-QUIANA 500 MG [place]. [place]) 1250 MG tablet   MAGNESIUM OXIDE PO   Ferrous Sulfate (IRON SUPPLEMENT PO)   B Complex-C (VITAMIN B COMPLEX W/VITAMIN C) TABS tablet   Ascorbic Acid (VITAMIN C PO)   aspirin 81 MG tablet   nitroGLYcerin (NITROSTAT) 0.4 MG sublingual tablet   omega 3 1000 MG CAPS   cinnamon 500 MG TABS   RANITIDINE HCL PO   NAPROXEN PO         Review of Systems   Constitutional: Negative for chills and fever.   HENT: Negative for congestion.    Eyes: Negative for visual disturbance.   Respiratory: Negative for cough and shortness of breath.    Cardiovascular: Negative for chest pain.   Gastrointestinal: Positive for abdominal pain (periumbilical), constipation (takes stool softeners) and nausea. Negative for diarrhea and vomiting.   Genitourinary: Negative for dysuria.   Neurological: Positive for headaches. Negative for weakness and numbness.   All other systems reviewed and are negative.      Physical Exam   BP: 133/85  Pulse: 82  Heart Rate: 90  Temp: 96.8  F (36  C)  Resp: 16  Weight: 85.9 kg (189 lb 6 oz)  SpO2: 96 %      Physical Exam   Constitutional: He is oriented to person, place, and time. He appears well-developed and well-nourished.  Non-toxic appearance. He does not have a sickly appearance. He does not appear ill. No distress.   HENT:   Head: Normocephalic and atraumatic.   Eyes: Conjunctivae and EOM are normal. Pupils are equal, round, and reactive to light.   Neck: Normal range of motion. Neck supple.   Cardiovascular: Normal rate, regular rhythm and normal heart sounds.    Pulmonary/Chest: Effort normal and breath sounds normal. No respiratory distress. He has no wheezes. He has no rales.   Abdominal: Soft. He exhibits no distension. There is tenderness (mild periumbilical tenderness). There is no rebound and no guarding.   Musculoskeletal:  Normal range of motion.   Neurological: He is alert and oriented to person, place, and time.   Skin: Skin is warm and dry. He is not diaphoretic.   Psychiatric: He has a normal mood and affect.   Nursing note and vitals reviewed.      ED Course     ED Course     Procedures    Results for orders placed or performed during the hospital encounter of 11/25/17 (from the past 24 hour(s))   CBC with platelets differential   Result Value Ref Range    WBC 13.0 (H) 4.0 - 11.0 10e9/L    RBC Count 4.87 4.4 - 5.9 10e12/L    Hemoglobin 14.9 13.3 - 17.7 g/dL    Hematocrit 43.8 40.0 - 53.0 %    MCV 90 78 - 100 fl    MCH 30.6 26.5 - 33.0 pg    MCHC 34.0 31.5 - 36.5 g/dL    RDW 13.6 10.0 - 15.0 %    Platelet Count 243 150 - 450 10e9/L    Diff Method Automated Method     % Neutrophils 93.2 %    % Lymphocytes 2.6 %    % Monocytes 3.7 %    % Eosinophils 0.1 %    % Basophils 0.2 %    % Immature Granulocytes 0.2 %    Absolute Neutrophil 12.1 (H) 1.6 - 8.3 10e9/L    Absolute Lymphocytes 0.3 (L) 0.8 - 5.3 10e9/L    Absolute Monocytes 0.5 0.0 - 1.3 10e9/L    Absolute Eosinophils 0.0 0.0 - 0.7 10e9/L    Absolute Basophils 0.0 0.0 - 0.2 10e9/L    Abs Immature Granulocytes 0.0 0 - 0.4 10e9/L   Comprehensive metabolic panel   Result Value Ref Range    Sodium 144 133 - 144 mmol/L    Potassium 4.0 3.4 - 5.3 mmol/L    Chloride 109 94 - 109 mmol/L    Carbon Dioxide 25 20 - 32 mmol/L    Anion Gap 10 3 - 14 mmol/L    Glucose 110 (H) 70 - 99 mg/dL    Urea Nitrogen 23 7 - 30 mg/dL    Creatinine 0.83 0.66 - 1.25 mg/dL    GFR Estimate >90 >60 mL/min/1.7m2    GFR Estimate If Black >90 >60 mL/min/1.7m2    Calcium 8.4 (L) 8.5 - 10.1 mg/dL    Bilirubin Total 0.6 0.2 - 1.3 mg/dL    Albumin 3.7 3.4 - 5.0 g/dL    Protein Total 7.4 6.8 - 8.8 g/dL    Alkaline Phosphatase 119 40 - 150 U/L    ALT 44 0 - 70 U/L    AST 28 0 - 45 U/L   Lipase   Result Value Ref Range    Lipase 115 73 - 393 U/L   Influenza A/B antigen   Result Value Ref Range    Influenza A/B Agn  Specimen Nasopharyngeal     Influenza A Negative NEG^Negative    Influenza B Negative NEG^Negative       Medications   ondansetron (ZOFRAN-ODT) ODT tab 4 mg (4 mg Oral Given 11/25/17 1553)   acetaminophen (TYLENOL) tablet 975 mg (975 mg Oral Given 11/25/17 1553)        Assessments & Plan (with Medical Decision Making)  Dion López is a 67 year old male who presented to the ED complaining of body aches and nausea that began this morning. He essentially has no other symptoms.  He has not taken anything for his symptoms.  On arrival to the ED his vitals are within normal limits.  He appeared nontoxic and not ill appearing. He had very mild periumbilical tenderness on exam but otherwise no acute findings.  Patient was given ODT Zofran and oral Tylenol here in the ED. Labs drawn, notable for mild leukocytosis of 13.0.  CMP was normal as was lipase. Rapid influenza screen was negative. I discussed these result with the patient. He reported feeling much improved after remedies given in the ED and comfortable with going home. The cause of his symptoms are not clear but based on reassuring exam, vital signs, and lab studies, it does not appear to be serious at this point. It may be due to early viral illness. I encouraged the patient to use tylenol as needed for recurrent body aches/headache, and he was prescribed zofran for recurrent nausea. Encouraged to drink plenty of fluids and rest over the next few days. If no improvement by next week he can follow up in the clinic. He was given instructions on when to return to the ED. Patient expressed understanding and was comfortable with plan.     I have reviewed the nursing notes.    I have reviewed the findings, diagnosis, plan and need for follow up with the patient.    Discharge Medication List as of 11/25/2017  5:13 PM      START taking these medications    Details   ondansetron (ZOFRAN ODT) 4 MG ODT tab Take 1 tablet (4 mg) by mouth every 8 hours as needed for nausea,  Disp-10 tablet, R-0, E-Prescribe             Final diagnoses:   Body aches   Nausea     Note: Chart documentation done in part with Dragon Voice Recognition software. Although reviewed after completion, some word and grammatical errors may remain.    11/25/2017   [facility] EMERGENCY DEPARTMENT     Zhane Ponce PA-C  11/25/17 0839

## 2017-11-25 NOTE — ED NOTES
"Woke up with nausea this morning and went to bed with a headache and feeling \"stiff and sore\".  "

## 2017-11-25 NOTE — ED AVS SNAPSHOT
[facility] Emergency Department    [address]     [place] [address]    Phone:  [phone]    Fax:  [phone]                                       Dion López   MRN: [id number]    Department:  [facility] Emergency Department   Date of Visit:  11/25/2017           Patient Information     Date Of Birth          1950        Your diagnoses for this visit were:     Body aches     Nausea        You were seen by Zhane Ponce PA-C.      Follow-up Information     Follow up with Sharri Bonner MD In 4 days.    Specialty:  Family Practice    Why:  As needed for persistent symptoms    Contact information:    [address]   [place] [address] [phone]          Follow up with [facility] Emergency Department.    Specialty:  EMERGENCY MEDICINE    Why:  If symptoms worsen    Contact information:    [address] [facility]   [place] [address] [phone]    Additional information:    From [address]: Exit at [address] on south side of [place]. Turn right on [address]. Turn left at stoplight on [address]. [facility] will be in view two blocks ahead        Discharge Instructions       At this time, the cause of your symptoms is not clear but it does not appear serious. Encourage you to rest over the next few days and drink plenty of fluids.  Use Tylenol as needed to manage body aches or headache.  The Zofran is intended for recurrent nausea.  Please follow up next week in the clinic if symptoms are not improving.  Return to the emergency department as discussed for worsening symptoms.    Thank you for choosing [facility]'s Emergency Department. It was a pleasure taking care of you today. If you have any questions, please call [phone].    Zhane Ponce PA-C    Home care  Follow these guidelines for taking care of yourself at home:    If symptoms are severe, rest at home for the first 2 to 3 days.    Stay away from cigarette  smoke - both your smoke and the smoke from others.    You may use over-the-counter acetaminophen or ibuprofen for fever, muscle aching, and headache, unless another medicine was prescribed for this. If you have chronic liver or kidney disease or ever had a stomach ulcer or GI bleeding, talk with your doctor before using these medicines. No one who is younger than 18 and ill with a fever should take aspirin. It may cause severe disease or death.    Your appetite may be poor, so a light diet is fine. Avoid dehydration by drinking 8 to 12 8-ounce glasses of fluids each day. This may include water; orange juice; lemonade; apple, grape, and cranberry juice; clear fruit drinks; electrolyte replacement and sports drinks; and decaffeinated teas and coffee. If you have been diagnosed with a kidney disease, ask your doctor how much and what types of fluids you should drink to prevent dehydration. If you have kidney disease, drinking too much fluid can cause it build up in the your body and be dangerous to your health.    Over-the-counter remedies won't shorten the length of the illness but may be helpful for cough, sore throat; and nasal and sinus congestion. Don't use decongestants if you have high blood pressure.  Follow-up care  Follow up with your healthcare provider if you do not improve over the next week.  Call 911  Get emergency medical care if any of the following occur:    Convulsion    Feeling weak, dizzy, or like you are going to faint    Chest pain, shortness of breath, wheezing, or difficulty breathing  When to seek medical advice  Call your healthcare provider right away if any of these occur:    Cough with lots of colored sputum (mucus) or blood in your sputum    Chest pain, shortness of breath, wheezing, or difficulty breathing    Severe headache; face, neck, or ear pain    Severe, constant pain in the lower right side of your belly (abdominal)    Continued vomiting (can t keep liquids down)    Frequent  diarrhea (more than 5 times a day); blood (red or black color) or mucus in diarrhea    Feeling weak, dizzy, or like you are going to faint    Extreme thirst    Fever of 100.4 F (38 C) or higher, or as directed by your healthcare provider      Future Appointments        Provider Department Dept Phone Center    12/5/2017 4:00 PM Sharri Maya Mai, MD [facility] [phone] [facility]    12/29/2017 11:00 AM NL LAB [facility] [phone] [facility]      24 Hour Appointment Hotline       To make an appointment at any [facility], call [phone] ([phone]). If you don't have a family doctor or clinic, we will help you find one. [facility] are conveniently located to serve the needs of you and your family.             Review of your medicines      START taking        Dose / Directions Last dose taken    ondansetron 4 MG ODT tab   Commonly known as:  ZOFRAN ODT   Dose:  4 mg   Quantity:  10 tablet        Take 1 tablet (4 mg) by mouth every 8 hours as needed for nausea   Refills:  0          Our records show that you are taking the medicines listed below. If these are incorrect, please call your family doctor or clinic.        Dose / Directions Last dose taken    aspirin 81 MG tablet   Dose:  81 mg   Quantity:  30 tablet        Take 1 tablet (81 mg) by mouth daily   Refills:  0        atorvastatin 20 MG tablet   Commonly known as:  LIPITOR   Dose:  20 mg   Quantity:  90 tablet        Take 1 tablet (20 mg) by mouth daily   Refills:  3        calcium carbonate 1250 MG tablet   Commonly known as:  OS-QUIANA 500 mg [place]. Ca   Dose:  1 tablet        Take 1 tablet by mouth daily   Refills:  0        cinnamon 500 MG Tabs        Refills:  0        IRON SUPPLEMENT PO   Dose:  325 mg        Take 325 mg by mouth daily   Refills:  0        lisinopril 5 MG tablet   Commonly known as:  PRINIVIL/ZESTRIL   Dose:  5 mg   Quantity:  30 tablet        Take 1 tablet (5 mg) by mouth daily Must  follow up before med runs out   Refills:  0        MAGNESIUM OXIDE PO   Dose:  200 mg        Take 200 mg by mouth daily   Refills:  0        MULTIPLE VITAMIN PO   Dose:  1 tablet        Take 1 tablet by mouth daily   Refills:  0        NAPROXEN PO   Dose:  250 mg        Take 250 mg by mouth 2 times daily (with meals)   Refills:  0        nitroGLYcerin 0.4 MG sublingual tablet   Commonly known as:  NITROSTAT   Quantity:  25 tablet        For chest pain place 1 tablet under the tongue every 5 minutes for 3 doses. If symptoms persist 5 minutes after 1st dose call 911.   Refills:  0        omega 3 1000 MG Caps   Dose:  1 g   Quantity:  90 capsule        Take 1 g by mouth daily   Refills:  0        PROBIOTIC DAILY PO   Dose:  1 tablet        Take 1 tablet by mouth daily   Refills:  0        RANITIDINE HCL PO   Dose:  150 mg        Take 150 mg by mouth daily   Refills:  0        vitamin b complex w/vitamin C Tabs tablet   Dose:  1 tablet        Take 1 tablet by mouth daily   Refills:  0        VITAMIN C PO   Dose:  1000 mg        Take 1,000 mg by mouth daily   Refills:  0                Prescriptions were sent or printed at these locations (1 Prescription)                   [facility]   [address] [address]    Telephone:  [phone]   Fax:  [phone]   Hours:                  These medications are not ready yet because we are checking if your insurance will help you pay for them. Call your pharmacy to confirm that your medication is ready for pickup. It may take up to 24 hours for them to receive the prescription. If the prescription is not ready within 3 business days, please contact your clinic or your provider (1 of 1)         ondansetron (ZOFRAN ODT) 4 MG ODT tab                Procedures and tests performed during your visit     CBC with platelets differential    Comprehensive metabolic panel    Influenza A/B antigen    Lipase      Orders Needing Specimen  "Collection     None      Pending Results     No orders found from 2017 to 2017.            Pending Culture Results     No orders found from 2017 to 2017.            Pending Results Instructions     If you had any lab results that were not finalized at the time of your Discharge, you can call the ED Lab Result RN at [phone]. You will be contacted by this team for any positive Lab results or changes in treatment. The nurses are available 7 days a week from 10A to 6:30P.  You can leave a message 24 hours per day and they will return your call.        Thank you for choosing [facility]       Thank you for choosing [facility] for your care. Our goal is always to provide you with excellent care. Hearing back from our patients is one way we can continue to improve our services. Please take a few minutes to complete the written survey that you may receive in the mail after you visit with us. Thank you!        [employer] Information     [employer] lets you send messages to your doctor, view your test results, renew your prescriptions, schedule appointments and more. To sign up, go to [email or web address] . Click on \"Log in\" on the left side of the screen, which will take you to the Welcome page. Then click on \"Sign up Now\" on the right side of the page.     You will be asked to enter the access code listed below, as well as some personal information. Please follow the directions to create your username and password.     Your access code is: [email or web address]  Expires: 2017  3:30 PM     Your access code will  in 90 days. If you need help or a new code, please call your [facility] clinic or [phone].        Care EveryWhere ID     This is your Care EveryWhere ID. This could be used by other organizations to access your [facility] medical records  [id number]        Equal Access to Services     SAIR[facility]OM[facility]: Killian Landrum, boy arteaga, desi jerome " trenton rascon ah. So [facility] [phone].    ATENCIÓN: Si habla español, tiene a garcia disposición servicios gratuitos de asistencia lingüística. Llame al [phone].    We comply with applicable federal civil rights laws and [place] laws. We do not discriminate on the basis of race, color, national origin, age, disability, sex, sexual orientation, or gender identity.            After Visit Summary       This is your record. Keep this with you and show to your community pharmacist(s) and doctor(s) at your next visit.

## 2017-11-25 NOTE — ED AVS SNAPSHOT
[facility] Emergency Department    [address] [place] [address]    Phone:  [phone]    Fax:  [phone]                                       Dion López   MRN: [id number]    Department:  [facility] Emergency Department   Date of Visit:  11/25/2017           After Visit Summary Signature Page     I have received my discharge instructions, and my questions have been answered. I have discussed any challenges I see with this plan with the nurse or doctor.    ..........................................................................................................................................  Patient/Patient Representative Signature      ..........................................................................................................................................  Patient Representative Print Name and Relationship to Patient    ..................................................               ................................................  Date                                            Time    ..........................................................................................................................................  Reviewed by Signature/Title    ...................................................              ..............................................  Date                                                            Time

## 2017-11-27 ENCOUNTER — CARE COORDINATION (OUTPATIENT)
Dept: CARE COORDINATION | Facility: CLINIC | Age: 67
End: 2017-11-27

## 2017-11-27 NOTE — LETTER
[facility] CARE COORDINATION  [address] [address]  Phone: [phone]      December 5, 2017      Dion López  [address] [place] [address]    Dear Dion,  I am the Clinic Care Coordinator that works with your primary care provider's clinic. I have been trying to reach you recently to introduce Clinic Care Coordination and to see if there was anything I could assist you with.  Below is a description of what Clinic Care Coordination is and how I can further assist you.     The Clinic Care Coordinator role is a Registered Nurse and/or  who understands the health care system. The goal of Clinic Care Coordination is to help you manage your health and improve access to the [facility] system in the most efficient manner.  The Registered Nurse can assist you in meeting your health care goals by providing education, coordinating services, and strengthening the communication among your providers. The  can assist you with financial, behavioral, psychosocial, and chemical dependency and counseling/psychiatric resources.    Please feel free to keep this letter and contact information to contact me at [phone] with any further questions or concerns that may arise. We at [facility] are focused on providing you with the highest-quality healthcare experience possible and that all starts with you.       Sincerely,     DEZ Hernández  Care Navigator  [facility]  [phone]

## 2017-11-29 NOTE — PROGRESS NOTES
Clinic Care Coordination Contact  [facility]/Voicemail    Referral Source: ED Follow-Up  Clinical Data: Care Coordinator Outreach  Outreach attempted x 1.  Left message on voicemail with call back information and requested return call.  Plan:  Care Coordinator will try to reach patient again in 1-2 business days.  DEZ Hernández  Care Navigbrunilda  [facility]  [phone]    Clinic Care Coordination Contact  [facility]/Voicemail    Referral Source: ED Follow-Up  Clinical Data: Care Coordinator Outreach  Outreach attempted x 2.  Left message on voicemail with call back information and requested return call.  Plan: Care Coordinator will mail out care coordination introduction letter with care coordinator contact information and explanation of care coordination services. Care Coordinator will do no further outreaches at this time.  DEZ Hernández  Care Mac  [facility]  [phone]

## 2017-12-29 DIAGNOSIS — E78.5 HYPERLIPIDEMIA LDL GOAL <70: ICD-10-CM

## 2017-12-29 LAB
ALT SERPL W P-5'-P-CCNC: 48 U/L (ref 0–70)
CHOLEST SERPL-MCNC: 123 MG/DL
HDLC SERPL-MCNC: 54 MG/DL
LDLC SERPL CALC-MCNC: 58 MG/DL
NONHDLC SERPL-MCNC: 69 MG/DL
TRIGL SERPL-MCNC: 54 MG/DL

## 2017-12-29 PROCEDURE — 36415 COLL VENOUS BLD VENIPUNCTURE: CPT | Performed by: PHYSICIAN ASSISTANT

## 2017-12-29 PROCEDURE — 80061 LIPID PANEL: CPT | Performed by: PHYSICIAN ASSISTANT

## 2017-12-29 PROCEDURE — 84460 ALANINE AMINO (ALT) (SGPT): CPT | Performed by: PHYSICIAN ASSISTANT

## 2018-01-29 DIAGNOSIS — I10 ESSENTIAL HYPERTENSION: ICD-10-CM

## 2018-01-29 NOTE — TELEPHONE ENCOUNTER
Last Written Prescription Date:  11/24/17  Last Fill Quantity: 30,  # refills: 0   Last Office Visit with [facility] provider:  9/14/17   Future Office Visit:

## 2018-01-30 RX ORDER — LISINOPRIL 5 MG/1
5 TABLET ORAL DAILY
Qty: 30 TABLET | Refills: 0 | Status: SHIPPED | OUTPATIENT
Start: 2018-01-30 | End: 2018-10-09

## 2018-01-30 NOTE — TELEPHONE ENCOUNTER
Requested Prescriptions   Pending Prescriptions Disp Refills     lisinopril (PRINIVIL/ZESTRIL) 5 MG tablet 30 tablet 0     Sig: Take 1 tablet (5 mg) by mouth daily Must follow up before med runs out    There is no refill protocol information for this order        Routing refill request to provider for review/approval because:  Per prior refill, patient needs to be seen in clinic.     Feli Bowen RN

## 2018-05-10 ENCOUNTER — APPOINTMENT (OUTPATIENT)
Dept: GENERAL RADIOLOGY | Facility: CLINIC | Age: 68
End: 2018-05-10
Attending: PHYSICIAN ASSISTANT
Payer: COMMERCIAL

## 2018-05-10 ENCOUNTER — HOSPITAL ENCOUNTER (EMERGENCY)
Facility: CLINIC | Age: 68
Discharge: HOME | End: 2018-05-10
Attending: PHYSICIAN ASSISTANT | Admitting: PHYSICIAN ASSISTANT
Payer: COMMERCIAL

## 2018-05-10 VITALS
OXYGEN SATURATION: 96 % | TEMPERATURE: 99.2 F | SYSTOLIC BLOOD PRESSURE: 149 MMHG | DIASTOLIC BLOOD PRESSURE: 84 MMHG | RESPIRATION RATE: 18 BRPM

## 2018-05-10 DIAGNOSIS — S49.91XA SHOULDER INJURY, RIGHT, INITIAL ENCOUNTER: ICD-10-CM

## 2018-05-10 PROCEDURE — 25000132 ZZH RX MED GY IP 250 OP 250 PS 637: Performed by: PHYSICIAN ASSISTANT

## 2018-05-10 PROCEDURE — 99284 EMERGENCY DEPT VISIT MOD MDM: CPT | Mod: Z6 | Performed by: PHYSICIAN ASSISTANT

## 2018-05-10 PROCEDURE — 73030 X-RAY EXAM OF SHOULDER: CPT | Mod: TC,RT

## 2018-05-10 PROCEDURE — 99283 EMERGENCY DEPT VISIT LOW MDM: CPT | Performed by: PHYSICIAN ASSISTANT

## 2018-05-10 RX ORDER — ACETAMINOPHEN 325 MG/1
975 TABLET ORAL ONCE
Status: COMPLETED | OUTPATIENT
Start: 2018-05-10 | End: 2018-05-10

## 2018-05-10 RX ADMIN — ACETAMINOPHEN 975 MG: 325 TABLET ORAL at 22:21

## 2018-05-10 ASSESSMENT — ENCOUNTER SYMPTOMS
NUMBNESS: 0
NECK PAIN: 0
WEAKNESS: 0

## 2018-05-10 NOTE — ED AVS SNAPSHOT
[facility] Emergency Department    [address] [place] [address]    Phone:  [phone]    Fax:  [phone]                                       Dion López   MRN: [id number]    Department:  [facility] Emergency Department   Date of Visit:  5/10/2018           After Visit Summary Signature Page     I have received my discharge instructions, and my questions have been answered. I have discussed any challenges I see with this plan with the nurse or doctor.    ..........................................................................................................................................  Patient/Patient Representative Signature      ..........................................................................................................................................  Patient Representative Print Name and Relationship to Patient    ..................................................               ................................................  Date                                            Time    ..........................................................................................................................................  Reviewed by Signature/Title    ...................................................              ..............................................  Date                                                            Time

## 2018-05-10 NOTE — ED AVS SNAPSHOT
[facility] Emergency Department    [address] [place] [address]    Phone:  [phone]    Fax:  [phone]                                       Dion López   MRN: [id number]    Department:  [facility] Emergency Department   Date of Visit:  5/10/2018           Patient Information     Date Of Birth          1950        Your diagnoses for this visit were:     Shoulder injury, right, initial encounter        You were seen by Zhane Ponce PA-C.      Follow-up Information     Follow up with [facility] Emergency Department.    Specialty:  EMERGENCY MEDICINE    Why:  If symptoms worsen    Contact information:    [address] [place] [address]   [place] [address] [phone]    Additional information:    From [address]: Exit at [address] on south side [address]. Turn right on [address]. Turn left at stoplight on [address]. [facility] will be in view two blocks ahead        Follow up with [facility]. Call in 1 week.    Specialty:  Orthopedics    Why:  As needed for persistent symptoms    Contact information:    [address] [place] [address] [phone]    Additional information:    From [address]: Exit at [employer] [address] [employer] [address] on [address].  Turn right   on [address].  Turn left at stoplight on [address].  [facility] will be in view two blocks ahead.        Discharge Instructions       Your x-ray did not show any fractures.  It might just be a bad bruise.  Please use ibuprofen or Tylenol for pain control.  Ice the shoulder as desired and practice gentle stretching to loosen up again.  Follow-up with orthopedics in a week or so if symptoms are not improving.  Return to the emergency department if symptoms worsen.    Thank you for choosing [facility]'s Emergency Department. It was a pleasure taking care of you today. If you have any questions, please call  [phone].    Zhane Ponce PA-C      Discharge References/Attachments     CONTUSION, SHOULDER (ENGLISH)      24 Hour Appointment Hotline       To make an appointment at any [facility] clinic, call [phone] ([phone]). If you don't have a family doctor or clinic, we will help you find one. [facility] clinics are conveniently located to serve the needs of you and your family.          ED Discharge Orders     AMMY POPE                    Review of your medicines      Our records show that you are taking the medicines listed below. If these are incorrect, please call your family doctor or clinic.        Dose / Directions Last dose taken    aspirin 81 MG tablet   Dose:  81 mg   Quantity:  30 tablet        Take 1 tablet (81 mg) by mouth daily   Refills:  0        atorvastatin 20 MG tablet   Commonly known as:  LIPITOR   Dose:  20 mg   Quantity:  90 tablet        Take 1 tablet (20 mg) by mouth daily   Refills:  3        calcium carbonate 500 MG tablet   Commonly known as:  OS-QUIANA 500 mg [place]. Ca   Dose:  1 tablet        Take 1 tablet by mouth daily   Refills:  0        cinnamon 500 MG Tabs        Refills:  0        IRON SUPPLEMENT PO   Dose:  325 mg        Take 325 mg by mouth daily   Refills:  0        lisinopril 5 MG tablet   Commonly known as:  PRINIVIL/ZESTRIL   Dose:  5 mg   Quantity:  30 tablet        Take 1 tablet (5 mg) by mouth daily Must follow up before med runs out   Refills:  0        MAGNESIUM OXIDE PO   Dose:  200 mg        Take 200 mg by mouth daily   Refills:  0        MULTIPLE VITAMIN PO   Dose:  1 tablet        Take 1 tablet by mouth daily   Refills:  0        NAPROXEN PO   Dose:  250 mg        Take 250 mg by mouth 2 times daily (with meals)   Refills:  0        nitroGLYcerin 0.4 MG sublingual tablet   Commonly known as:  NITROSTAT   Quantity:  25 tablet        For chest pain place 1 tablet under the tongue every 5 minutes for 3 doses. If symptoms persist 5 minutes after 1st dose call  "911.   Refills:  0        omega 3 1000 MG Caps   Dose:  1 g   Quantity:  90 capsule        Take 1 g by mouth daily   Refills:  0        PROBIOTIC DAILY PO   Dose:  1 tablet        Take 1 tablet by mouth daily   Refills:  0        RANITIDINE HCL PO   Dose:  150 mg        Take 150 mg by mouth daily   Refills:  0        vitamin b complex w/vitamin C Tabs tablet   Dose:  1 tablet        Take 1 tablet by mouth daily   Refills:  0        VITAMIN C PO   Dose:  1000 mg        Take 1,000 mg by mouth daily   Refills:  0                Procedures and tests performed during your visit     XR Shoulder Right G/E 3 Views      Orders Needing Specimen Collection     None      Pending Results     No orders found from 5/8/2018 to 5/11/2018.            Pending Culture Results     No orders found from 5/8/2018 to 5/11/2018.            Pending Results Instructions     If you had any lab results that were not finalized at the time of your Discharge, you can call the ED Lab Result RN at [phone]. You will be contacted by this team for any positive Lab results or changes in treatment. The nurses are available 7 days a week from 10A to 6:30P.  You can leave a message 24 hours per day and they will return your call.        Thank you for choosing [facility]       Thank you for choosing [facility] for your care. Our goal is always to provide you with excellent care. Hearing back from our patients is one way we can continue to improve our services. Please take a few minutes to complete the written survey that you may receive in the mail after you visit with us. Thank you!        [employer] Information     [employer] lets you send messages to your doctor, view your test results, renew your prescriptions, schedule appointments and more. To sign up, go to [email or web address] . Click on \"Log in\" on the left side of the screen, which will take you to the Welcome page. Then click on \"Sign up Now\" on the right side of the page.     You will be asked to " enter the access code listed below, as well as some personal information. Please follow the directions to create your username and password.     Your access code is: [email or web address]  Expires: 2018 10:35 PM     Your access code will  in 90 days. If you need help or a new code, please call your [facility] clinic or [phone].        Care EveryWhere ID     This is your Care EveryWhere ID. This could be used by other organizations to access your [facility] medical records  [id number]        Equal Access to Services     [facility]: Hadii anjel Landrum, wamary ellen arteaga, virgie kaalservando morgan, desi rascon . So [facility] [phone].    ATENCIÓN: Si habla español, tiene a garcia disposición servicios gratuitos de asistencia lingüística. Llame al [phone].    We comply with applicable federal civil rights laws and [place] laws. We do not discriminate on the basis of race, color, national origin, age, disability, sex, sexual orientation, or gender identity.            After Visit Summary       This is your record. Keep this with you and show to your community pharmacist(s) and doctor(s) at your next visit.

## 2018-05-11 NOTE — ED TRIAGE NOTES
"Patient tripped and fell into a wall and \"jammed\" his right shoulder about 2.5 hours prior.  Denies numbness / tingling - right arm.  Patient refused a cold pack.  "

## 2018-05-11 NOTE — DISCHARGE INSTRUCTIONS
Your x-ray did not show any fractures.  It might just be a bad bruise.  Please use ibuprofen or Tylenol for pain control.  Ice the shoulder as desired and practice gentle stretching to loosen up again.  Follow-up with orthopedics in a week or so if symptoms are not improving.  Return to the emergency department if symptoms worsen.    Thank you for choosing [facility]. It was a pleasure taking care of you today. If you have any questions, please call [phone].    Zhane Ponce PA-C
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
## 2018-05-11 NOTE — ED NOTES
Administered Tylenol prior to discharge, pt stated he was getting sleepy and was feeling stiffness in that shoulder.    Reviewed discharge instructions with pt. Discussed stretching exercises with pt.   No additional questions or concerns.
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
## 2018-05-16 ENCOUNTER — OFFICE VISIT (OUTPATIENT)
Dept: ORTHOPEDICS | Facility: OTHER | Age: 68
End: 2018-05-16
Payer: COMMERCIAL

## 2018-05-16 VITALS
BODY MASS INDEX: 26.18 KG/M2 | WEIGHT: 187 LBS | SYSTOLIC BLOOD PRESSURE: 115 MMHG | DIASTOLIC BLOOD PRESSURE: 74 MMHG | HEIGHT: 71 IN

## 2018-05-16 DIAGNOSIS — M75.101 TEAR OF RIGHT ROTATOR CUFF, UNSPECIFIED TEAR EXTENT: Primary | ICD-10-CM

## 2018-05-16 PROCEDURE — 99204 OFFICE O/P NEW MOD 45 MIN: CPT | Performed by: ORTHOPAEDIC SURGERY

## 2018-05-16 ASSESSMENT — PAIN SCALES - GENERAL: PAINLEVEL: MODERATE PAIN (4)
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
## 2018-05-21 ENCOUNTER — HOSPITAL ENCOUNTER (OUTPATIENT)
Dept: MRI IMAGING | Facility: CLINIC | Age: 68
Discharge: HOME | End: 2018-05-21
Attending: ORTHOPAEDIC SURGERY | Admitting: ORTHOPAEDIC SURGERY
Payer: COMMERCIAL

## 2018-05-21 DIAGNOSIS — M75.101 TEAR OF RIGHT ROTATOR CUFF, UNSPECIFIED TEAR EXTENT: ICD-10-CM

## 2018-05-21 PROCEDURE — 73221 MRI JOINT UPR EXTREM W/O DYE: CPT | Mod: RT

## 2018-05-23 ENCOUNTER — OFFICE VISIT (OUTPATIENT)
Dept: ORTHOPEDICS | Facility: CLINIC | Age: 68
End: 2018-05-23
Payer: COMMERCIAL

## 2018-05-23 VITALS
BODY MASS INDEX: 26.32 KG/M2 | WEIGHT: 188 LBS | SYSTOLIC BLOOD PRESSURE: 130 MMHG | HEIGHT: 71 IN | TEMPERATURE: 97.8 F | DIASTOLIC BLOOD PRESSURE: 70 MMHG

## 2018-05-23 DIAGNOSIS — I10 BENIGN ESSENTIAL HYPERTENSION: ICD-10-CM

## 2018-05-23 DIAGNOSIS — M19.019 ARTHROPATHY OF SHOULDER REGION: Primary | ICD-10-CM

## 2018-05-23 PROCEDURE — 20610 DRAIN/INJ JOINT/BURSA W/O US: CPT | Mod: RT | Performed by: ORTHOPAEDIC SURGERY

## 2018-05-23 PROCEDURE — 99213 OFFICE O/P EST LOW 20 MIN: CPT | Mod: 25 | Performed by: ORTHOPAEDIC SURGERY

## 2018-05-23 ASSESSMENT — PAIN SCALES - GENERAL: PAINLEVEL: MILD PAIN (2)

## 2018-05-23 NOTE — LETTER
"    5/23/2018         RE: Dion López  [address]  [place] [address]        Dear Colleague,    Thank you for referring your patient, Dion López, to the [facility]. Please see a copy of my visit note below.    Office Visit-Follow up      Chief Complaint: Dion López is a 67 year old male who is being seen for   Chief Complaint   Patient presents with     Results     MRI results of rt shoulder done on 5/21/18         History of Present Illness:   Shoulder doing okay until he slammed into the wall 2 wks ago  Had a lot of questions which were answered      Past medical history reviewed and there is no significant change    Patient does not use Tobacco products.    REVIEW OF SYSTEMS  General: negative for, night sweats, dizziness, fatigue  Resp: No shortness of breath and no cough  CV: negative for chest pain, syncope or near-syncope  GI: negative for nausea, vomiting and diarrhea  : negative for dysuria and hematuria  Musculoskeletal: as above  Neurologic: negative for syncope   Hematologic: negative for bleeding disorder      Physical Exam:  Vitals: /70 (BP Location: Right arm, Patient Position: Sitting, Cuff Size: Adult Large)  Temp 97.8  F (36.6  C) (Temporal)  Ht 1.803 m (5' 11\")  Wt 85.3 kg (188 lb)  BMI 26.22 kg/m2  BMI= Body mass index is 26.22 kg/(m^2).  Constitutional: healthy, alert and no acute distress   Psychiatric: mentation appears normal and affect normal/bright  NEURO: no focal deficits  RESP: Normal with easy respirations and no use of accessory muscles to breathe, no audible wheezing or retractions  CV: No peripheral edema  SKIN: No erythema, rashes, excoriation, or breakdown. No evidence of infection.   JOINT/EXTREMITIES:right shoulder - , positive impingement, decent strength in abduction and IR, weak and painful ER  GAIT: non-antalgic      Diagnostic Modalities:  right shoulder MRI:  massive rotator cuff tear, irreparable  Independent visualization of " the images was performed.      Impression: right Rotator cuff arthropathy    Plan:  All of the above pertinent physical exam and imaging modalities findings was reviewed with Dion.  He can't take time off for work for surgery, so he opts for nonop tx.                                          INJECTION PROCEDURE:  The patient was counseled about an  injection, including discussion of risks (including infection), contents of the injection, rationale for performing the injection, and expected benefits of the injection. The skin was prepped with alcohol and betadine and then utilizing sterile technique an injection of the right shoulder subacromial space from the posterolateral approach  was performed. The injection consisted 1ml of Kenalog (40mg per 1ml) with 8ml 1% lidocaine plain. The patient tolerated the injection well, and there were no complications. The injection site was covered with a Band-Aid. The injection was performed by ALE Schroeder    BP Readings from Last 1 Encounters:   05/23/18 130/70       BP noted to be well controlled today in office.     Return to clinic 6, week(s), or sooner as needed for changes.  Re-x-ray on return: No    Lizbeth García M.D.          Again, thank you for allowing me to participate in the care of your patient.        Sincerely,        Lizbeth García MD

## 2018-05-23 NOTE — TELEPHONE ENCOUNTER
"Last Written Prescription Date:  1/30/18  Last Fill Quantity: 30,  # refills: 0   Last office visit: 9/14/2017 with prescribing provider:  9/14/17   Future Office Visit:      Requested Prescriptions   Pending Prescriptions Disp Refills     lisinopril (PRINIVIL/ZESTRIL) 5 MG tablet [Pharmacy Med Name: LISINOPRIL 5MG      TAB] 30 tablet 1     Sig: TAKE ONE TABLET BY MOUTH ONCE DAILY    ACE Inhibitors (Including Combos) Protocol Passed    5/23/2018  5:50 PM       Passed - Blood pressure under 140/90 in past 12 months    BP Readings from Last 3 Encounters:   05/23/18 130/70   05/16/18 115/74   05/10/18 149/84                Passed - Recent (12 mo) or future (30 days) visit within the authorizing provider's specialty    Patient had office visit in the last 12 months or has a visit in the next 30 days with authorizing provider or within the authorizing provider's specialty.  See \"Patient Info\" tab in inbasket, or \"Choose Columns\" in Meds & Orders section of the refill encounter.           Passed - Patient is age 18 or older       Passed - Normal serum creatinine on file in past 12 months    Recent Labs   Lab Test  11/25/17   1540   CR  0.83            Passed - Normal serum potassium on file in past 12 months    Recent Labs   Lab Test  11/25/17   1540   POTASSIUM  4.0               "

## 2018-05-23 NOTE — NURSING NOTE
Patient requesting Dr. García review 2013 MRI images from [facility], informed patient he will need to sign a CORINA from [facility] and ask for a disc and bring the actual disc to us, and we will have the images uploaded. ...........................Verna Conde CMA  ([facility])

## 2018-05-23 NOTE — NURSING NOTE
"Dion López is a 67 year old male who presents for:  Chief Complaint   Patient presents with     Results     MRI results of rt shoulder done on 5/21/18        Initial Vitals:  /70 (BP Location: Right arm, Patient Position: Sitting, Cuff Size: Adult Large)  Temp 97.8  F (36.6  C) (Temporal)  Ht 1.803 m (5' 11\")  Wt 85.3 kg (188 lb)  BMI 26.22 kg/m2 Estimated body mass index is 26.22 kg/(m^2) as calculated from the following:    Height as of this encounter: 1.803 m (5' 11\").    Weight as of this encounter: 85.3 kg (188 lb).. Body surface area is 2.07 meters squared. BP completed using cuff size: large  Mild Pain (2)    Do you feel safe in your environment?  Yes  Do you need any refills today? No    Nursing Comments:         Verna Conde CMA  "

## 2018-05-23 NOTE — PROGRESS NOTES
"Office Visit-Follow up      Chief Complaint: Dion López is a 67 year old male who is being seen for   Chief Complaint   Patient presents with     Results     MRI results of rt shoulder done on 5/21/18         History of Present Illness:   Shoulder doing okay until he slammed into the wall 2 wks ago  Had a lot of questions which were answered      Past medical history reviewed and there is no significant change    Patient does not use Tobacco products.    REVIEW OF SYSTEMS  General: negative for, night sweats, dizziness, fatigue  Resp: No shortness of breath and no cough  CV: negative for chest pain, syncope or near-syncope  GI: negative for nausea, vomiting and diarrhea  : negative for dysuria and hematuria  Musculoskeletal: as above  Neurologic: negative for syncope   Hematologic: negative for bleeding disorder      Physical Exam:  Vitals: /70 (BP Location: Right arm, Patient Position: Sitting, Cuff Size: Adult Large)  Temp 97.8  F (36.6  C) (Temporal)  Ht 1.803 m (5' 11\")  Wt 85.3 kg (188 lb)  BMI 26.22 kg/m2  BMI= Body mass index is 26.22 kg/(m^2).  Constitutional: healthy, alert and no acute distress   Psychiatric: mentation appears normal and affect normal/bright  NEURO: no focal deficits  RESP: Normal with easy respirations and no use of accessory muscles to breathe, no audible wheezing or retractions  CV: No peripheral edema  SKIN: No erythema, rashes, excoriation, or breakdown. No evidence of infection.   JOINT/EXTREMITIES:right shoulder - , positive impingement, decent strength in abduction and IR, weak and painful ER  GAIT: non-antalgic      Diagnostic Modalities:  right shoulder MRI:  massive rotator cuff tear, irreparable  Independent visualization of the images was performed.      Impression: right Rotator cuff arthropathy    Plan:  All of the above pertinent physical exam and imaging modalities findings was reviewed with Dion.  He can't take time off for work for surgery, so he " opts for nonop tx.                                          INJECTION PROCEDURE:  The patient was counseled about an  injection, including discussion of risks (including infection), contents of the injection, rationale for performing the injection, and expected benefits of the injection. The skin was prepped with alcohol and betadine and then utilizing sterile technique an injection of the right shoulder subacromial space from the posterolateral approach  was performed. The injection consisted 1ml of Kenalog (40mg per 1ml) with 8ml 1% lidocaine plain. The patient tolerated the injection well, and there were no complications. The injection site was covered with a Band-Aid. The injection was performed by ALE Schroeder    BP Readings from Last 1 Encounters:   05/23/18 130/70       BP noted to be well controlled today in office.     Return to clinic 6, week(s), or sooner as needed for changes.  Re-x-ray on return: No    Lizbeth García M.D.

## 2018-05-23 NOTE — PATIENT INSTRUCTIONS
Encounter Diagnosis   Name Primary?     Arthropathy of shoulder right Yes      Rest, ice and elevate above heart level as needed for pain control  1. MRI: the MRI shows us that you have a massive cuff tear that we can not repair.  It is 3 of the 4 rotator cuff tendons.  2. You also have arthritis in your shoulder.  3. The best treatment for this is a Revers total shoulder arthroplasty  4. If you do not want to do surgery then you can do exercises.  5. You could do an injection but then you could not do surgery for 3 months after the injection.  6. You can go back to work if you would like.    7. We decided to do a cortisone injection today.  This is like putting very powerful ibuprofen right to the area it is needed.  We have information about the injection below.    8. We have exercises below for you.  9. Can see Dr. Henderson for your back.  10. You can followup with Lizbeth García MD in 6 weeks, if you are having pain at that time we can do a cortisone injection.  You can always cancel the appointment if you are doing really well and follow-up as needed.  Cortisone Instructions:     1. You received an injection of cortisone into your right shoulder today.  2. The joint(s) may be more painful for the first 1-2 days.  3. We ask you to continue to rest the joint(s) for a few more days before resuming regular activities.  4. Pain Medications you can take (as long as your primary care provider allows these meds and you do not have kidney or liver conditions):  Tylenol  Take 1000 mg by mouth every 6 hours as needed; maximum dose 4000 mg a day  Ibuprofen  600 mg every 6 hours as needed; maximum 2400 mg a day  (OK to take tylenol and ibuprofen at the same time)  5. Rest, ice and elevate as needed for pain control  6. Watch for these signs of infection: redness, swelling, drainage, warmth to touch, increased pain, or fever. Call the clinic or make an appointment to be seen if you think you have an infection.  7. If you  are diabetic, make sure you keep a close eye on your blood sugars, they can get elevated with cortisone injections.   8. Sometimes it can take 1-2 weeks for it to reach its full effect.    Cortisone Injections  Cortisone is a type of steroid. It can greatly reduce swelling, redness, and irritation (inflammation) and pain. Being injected with cortisone is simple and doesn t take long. Your doctor may ask you questions about your health. Certain health conditions, such as diabetes, can be affected by cortisone.     Your pain may be relieved by a cortisone injection.   Why have a cortisone injection?  Injecting cortisone can relieve pain for anything from a sports injury to arthritis. Your doctor may suggest an injection if rest, splints, or oral medicine doesn t relieve your pain. Injecting cortisone is simpler than having surgery. And cortisone may provide the lasting pain relief that can help you get out and enjoy life again.  Getting the injection  Your doctor will start by cleaning and occasionally numbing your skin at the injection site. Next you ll be injected with local anesthetics (for short-term pain relief) and cortisone. The injection may last a few moments. A small bandage will be put over the injection site. You ll then be ready to go home.  After your injection  After being injected, make sure you don t injure the treated region. But stay active. Enjoy a walk or some other mild activity. Just be careful not to strain the region that gave you trouble.  The next day  Some people feel more pain after being injected. This is normal, and it will go away soon. Applying ice for 20 minutes at a time to your injury may reduce the increased pain. Rest for the first day or two. You don t need to stay in bed. But avoid tasks that may strain the injured region.  If you have diabetes  Cortisone injections can cause blood sugar to be increased for several days after the injection. If you have diabetes, you should  follow your blood  sugar closely during this time. Follow your regular plan for what to do when your blood sugar is elevated.     1809-9522 [employer]. [address] [place] [address], [place] [address]. All rights reserved. This information is not intended as a substitute for professional medical care. Always follow your healthcare professional's instructions.     Surgery Information:   1. Today we discussed surgery, the risks, benefits and alternatives. Risks are infection, bleeding or nerve issues, anesthesia problems and dislocation. All of these are very rare.  2. The surgery would be a right Reverse total shoulder arthroplasty.  This means removing the arthritic parts of the shoulder and replacing it with metal and plastic.  We do the Reverse when the rotator cuff is not functioning.  This means putting the ball side (humerus or arm bone) on the socket side (glenoid) and the socket side on the ball side.  This allows the deltoid muscle to do the work the rotator cuff would usually do, as in raise your arm up.  3. The only main difference between the total shoulder replacement and the reverse total shoulder replacement is often times people with the reverse are not able to get their arm very high above shoulder level, however they can do activity with their arm at shoulder level.  Also the pain is improved just like the total shoulder arthroplasty also.  4. After the surgery will stay overnight with us.  Most people leave the afternoon of the next day.  5. You will have therapy in the hospital, they will teach you exercises you can do on your own at home after discharge.   6. You will need a Pre Operative History and Physical from Sharri Maya Mai, MD or another provider prior to surgery. This needs to be within 30 days prior to surgery. We can help you set this up.  7.  You will be back to doing a lot of what you are doing now at 3 months after surgery; full recovery would be closer to 1 year.         Reverse  Total Shoulder Replacement  Reverse total shoulder replacement is a type of surgery. It s done to repair an injury to the rotator cuff.  Understanding the shoulder joint  The shoulder joint is where the ball-shaped part of the upper arm bone (humerus) meets the cup-shaped socket of the shoulder blade (scapula). A group of muscles and tendons hold the joint together. These muscles and tendons are called the rotator cuff. The muscles let you move your arm and shoulder.  Why reverse total shoulder replacement is done  The surgery may be needed if you have a complete tear of your rotator cuff. The tear may cause long-term problems with your shoulder joint. This is called cuff tear arthropathy. You may need reverse total shoulder replacement surgery if you have any of these:    A complete tear in your rotator cuff    Joint problems from the cuff tear (cuff tear arthropathy)    Previous total shoulder replacement surgery that didn t relieve symptoms    Severe pain and trouble moving your shoulder    No success relieving your symptoms with treatments such as rest, medicines, cortisone injections, or physical therapy  How reverse total shoulder replacement is done  The surgery is the opposite (reverse) of standard total shoulder replacement surgery:    In the standard surgery, the ball of the humerus is replaced with an artificial ball. The socket of the scapula is replaced with an artificial socket. The new joint still uses the rotator cuff muscles to move the arm and shoulder.    With the reverse surgery, the ball of the humerus is replaced with an artificial socket. The socket of the scapula is replaced with an artificial ball. Since the rotator cuff muscles are damaged, another muscle (deltoid) moves the arm and shoulder.  Risks of reverse total shoulder replacement  Every surgery has risks. Risks for this surgery include:    Infection    Blood loss    Damage to nerves that may make it hard to move your arm    Damage to  the humerus or scapula    Artificial joint moving out of position (dislocation)    Problems with general anesthesia  Some risks may be higher if you have had shoulder surgery in the past. Your risks may vary depending on your shoulder problem and your overall health. Talk with your doctor about which risks apply most to you.    4098-1491 [employer]. [address] [place] [address]. All rights reserved. This information is not intended as a substitute for professional medical care. Always follow your healthcare professional's instructions.        After Reverse Total Shoulder Replacement  Reverse total shoulder replacement is a type of surgery. It s done to repair an injury to the rotator cuff. The rotator cuff is a group of muscles and tendons that hold the shoulder joint together.  After your surgery  After the procedure, you will spend several hours in a recovery room. You may be sleepy and confused when you wake up. Your health care team will watch your vital signs, such as your heart rate and breathing. Your arm and shoulder will feel numb if you had regional anesthesia. This will begin to wear off over several hours. You ll be given pain medicine if you need it. You may also be given antibiotic medicine. This is to help prevent infection.  You ll likely be in the hospital for 2 to 3 days. To help lessen pain and swelling, a cooling device may be used on your shoulder. You will likely have follow-up X-rays. These are to make sure the artificial ball and socket are in place.  You may begin physical therapy in the hospital. This is to help you regain strength and movement.  Recovering at home  Follow all the instructions your health care provider gives you for medicines, exercise, diet, and wound care. Your arm will probably be in a sling for several weeks. You will continue to have some pain as you heal. But any pain you had from the rotator cuff injury and joint damage should be  gone.  Continue to use the cooling device on your shoulder. Do physical therapy as instructed. Limit the use of your arm and shoulder as instructed. Your doctor will tell you when you can return to normal activities.  Follow-up care  Make sure to keep all of your follow-up appointments with your surgeon and with your physical therapist. Most joints last for several years before they need to be replaced. Talk with your surgeon about what he or she expects for you.     When to call your health care provider  Call your health care provider right away if any of these occur:    Fever of 100.4 F (38 C) or higher    Redness, swelling, or fluid leaking from your incision that gets worse    Pain that gets worse    Symptoms that don t get better, or get worse    New symptoms        3861-2485 [employer]. [address] [place] [address]. All rights reserved. This information is not intended as a substitute for professional medical care. Always follow your healthcare professional's instructions.    [employer] may offer reliable information regarding your diagnosis and treatment plan.  Exercises for Shoulder Flexibility: External Rotation  This stretch can help restore shoulder flexibility and relieve pain over time. When stretching, be sure to breathe deeply. Follow any special instructions from your doctor or physical therapist:     a doorway. Grasp the doorjamb with the hand on the frozen side. Your arm should be bent.    With the other hand, hold the elbow on the frozen side firmly against your body.    Standing in the same spot, rotate your body away from the doorjamb. Stop when you feel the stretch in the shoulder. At first, try to hold the stretch for 5 seconds.    Work up to doing 3 sets of this stretch, 3 times a day. Work up to holding the stretch for 30 to 60 seconds.  Note: Keep your arms as still as you can. Over time, rotate your body a little more to enhance the  stretch. But be careful not to twist your back.        Exercises for Shoulder Flexibility: Internal Rotation    This stretch can help restore shoulder flexibility and relieve pain over time. When stretching, be sure to breathe deeply. Follow any special instructions from your healthcare provider or physical therapist.    While seated, move the arm on the side you want to stretch toward the middle of your back. The palm of your hand should face out.    Cup your other hand under the hand that s behind your back. Gently push your cupped hand upward until you feel the stretch in the shoulder. Try to hold the stretch for 5 seconds.    Work up to doing 3 sets of this stretch, 3 times a day. Work up to holding the stretch for 30 to 60 seconds.  Note: Keep your back straight. It s OK if your hand can t reach the middle of your back. Instead, start the stretch with your hand as close as you can get it to the middle of your back.      Exercises for Shoulder Flexibility: Wall Walk  Improving your flexibility can reduce pain. Stretching exercises also can help increase your range of pain-free motion. Breathe normally when you exercise. Use smooth, fluid movements.  Note: Follow any special instructions you are given. If you feel pain, stop the exercise. If the pain continues after stopping, call your healthcare provider:    Stand with your shoulder about 2 feet from the wall.    Raise your arm to shoulder level and gently  walk  your fingers up the wall as high as you can.    Hold for a few seconds. Then walk your fingers back down.    Repeat 3 times. Move closer to the wall as you repeat.    Build up to holding each stretch for 30 seconds.  Caution: Do this stretch only if your healthcare provider recommends it. Don t do it when you are first injured.            7383-0862 [employer]. [address] [place] [address], [place] [address]. All rights reserved. This information is not intended as a substitute for professional  medical care. Always follow your healthcare professional's instructions.      Shoulder Exercises: External Rotation  Strengthening exercises help make your injured shoulder more stable. To warm up, do flexibility (stretching) exercises first. Your healthcare provider will tell you what size hand weights to use for the strengthening exercise below. If you don t have hand weights, try using cans of soup instead:    Lie on your uninjured side with your head supported by a pillow or your arm. Place a small rolled-up towel under your top elbow.    Grasp a hand weight with your top hand and bend that arm to a right angle, resting your forearm against your stomach.    Keeping your elbow against the towel, slowly lift the weight until your forearm is slightly higher than your elbow. Return to the starting position. Repeat.    Work up to 5 to 15 lifts.    1291-1087 [employer]. [address] [place] [address]. All rights reserved. This information is not intended as a substitute for professional medical care. Always follow your healthcare professional's instructions.        Shoulder Exercises: Internal Rotation    Strengthening exercises help make your injured shoulder more stable. To warm up, do flexibility (stretching) exercises first. Your healthcare provider will tell you what size hand weights to use for the strengthening exercise below. If you don t have hand weights, try using cans of soup instead:    With knees bent, lie on a firm surface. Using the hand on the same side as your injured shoulder, grasp a weight. Bend that arm to a right angle (90 degrees).    Rest your elbow on the floor.    Keeping your elbow next to your side, lower your forearm toward the floor, away from your body. Do not lower your hand all the way to the floor.    Slowly return your forearm to your side. Repeat.    Work up to 5 to 15 lifts.     Note: Support your head and neck with a pillow.     1652-6446 [employer]. [address] [place] [address]. All rights reserved. This information is not intended as a substitute for professional medical care. Always follow your healthcare professional's instructions.        Shoulder Exercises: Side Raise    This exercise stretches and strengthens your shoulders. Before starting, read through all the instructions. During the exercise, breathe normally and use smooth movements. Stop if you feel any pain. If pain persists, call your healthcare provider.    Stand straight, holding a ____ pound weight in each hand, arms at sides, feet shoulder-width apart.    Slowly extend your arms up and out until weights are at shoulder level. Slowly return to starting position.    Repeat ____ times. Do ____ sets ____ times a day.     CAUTION: Don t swing the weights or raise weights above shoulder level.     8293-5143 [employer]. [address] [place] [address]. All rights reserved. This information is not intended as a substitute for professional medical care. Always follow your healthcare professional's instructions.      THANK YOU for coming in today. If you receive a survey via [employer] or mail please let us know if there was anything you especially appreciated today or if there is any way we can improve our clinic. We appreciate your input.    GENERAL INFORMATION:  Our hours are:  Monday :     Clinic 7:30 AM-430 PM ([facility])  Tuesday:      Operating Room All Day ([facility])  Wednesday: Clinic 7:30 AM - 11:15 AM ([facility])             Clinic 1:00 PM - 4:00PM ([facility])  Thursday:     Administrative Day  Friday:          Clinic 7:30 AM - 11:15 AM ([facility])            Clinic 1:00 PM - 4:00 PM ([facility])    [facility] for any issues or concerns: [phone]      We are not in the office Thursdays. Therefore non- urgent calls  and medical messages received on Thursday will be addressed when we are back in the office on Wednesday. Urgent matters will be reviewed and addressed by one of our partners in the office as needed.    If lab work was done today as part of your evaluation you will generally be contacted via NI, mail, or phone with the results within 1-5 days. If there is an alarming result we will contact you by phone. Lab results come back at varying times, I generally wait until all labs are resulted before making comments on results. Please note labs are automatically released to NI (if you have signed up for it) once available-at times you may see these prior to my having a chance to review them as well.    If you need refills please contact your pharmacist. They will send a refill request to me to review. Please allow 3 business days for us to process all refill requests. All narcotic refills should be handled in the clinic at the time of your visit.

## 2018-05-23 NOTE — LETTER
[address] [address]  Phone: [phone]  Fax: [phone]    May 23, 2018        Dion López  [address]  [facility] [address]          To whom it may concern:    RE: Dion López    Patient was seen and treated today at our clinic.  Patient may return to work with the following:  No lifting greater than 25-30 pounds or pushing greater than 50 pounds for 6 weeks.     Please contact me for questions or concerns.      Sincerely,      TROY Rojas MD

## 2018-05-23 NOTE — MR AVS SNAPSHOT
After Visit Summary   5/23/2018    Dion López    MRN: [id number]           Patient Information     Date Of Birth          1950        Visit Information        Provider Department      5/23/2018 4:20 PM Lizbeth García MD [facility]        Today's Diagnoses     Arthropathy of shoulder right    -  1      Care Instructions    Encounter Diagnosis   Name Primary?     Arthropathy of shoulder right Yes      Rest, ice and elevate above heart level as needed for pain control  1. MRI: the MRI shows us that you have a massive cuff tear that we can not repair.  It is 3 of the 4 rotator cuff tendons.  2. You also have arthritis in your shoulder.  3. The best treatment for this is a Revers total shoulder arthroplasty  4. If you do not want to do surgery then you can do exercises.  5. You could do an injection but then you could not do surgery for 3 months after the injection.  6. You can go back to work if you would like.    7. We decided to do a cortisone injection today.  This is like putting very powerful ibuprofen right to the area it is needed.  We have information about the injection below.    8. We have exercises below for you.  9. Can see Dr. Henderson for your back.  10. You can followup with Lizbeth García MD in 6 weeks, if you are having pain at that time we can do a cortisone injection.  You can always cancel the appointment if you are doing really well and follow-up as needed.  Cortisone Instructions:     1. You received an injection of cortisone into your right shoulder today.  2. The joint(s) may be more painful for the first 1-2 days.  3. We ask you to continue to rest the joint(s) for a few more days before resuming regular activities.  4. Pain Medications you can take (as long as your primary care provider allows these meds and you do not have kidney or liver conditions):  Tylenol  Take 1000 mg by mouth every 6 hours as needed; maximum dose 4000 mg a  day  Ibuprofen  600 mg every 6 hours as needed; maximum 2400 mg a day  (OK to take tylenol and ibuprofen at the same time)  5. Rest, ice and elevate as needed for pain control  6. Watch for these signs of infection: redness, swelling, drainage, warmth to touch, increased pain, or fever. Call the clinic or make an appointment to be seen if you think you have an infection.  7. If you are diabetic, make sure you keep a close eye on your blood sugars, they can get elevated with cortisone injections.   8. Sometimes it can take 1-2 weeks for it to reach its full effect.    Cortisone Injections  Cortisone is a type of steroid. It can greatly reduce swelling, redness, and irritation (inflammation) and pain. Being injected with cortisone is simple and doesn t take long. Your doctor may ask you questions about your health. Certain health conditions, such as diabetes, can be affected by cortisone.     Your pain may be relieved by a cortisone injection.   Why have a cortisone injection?  Injecting cortisone can relieve pain for anything from a sports injury to arthritis. Your doctor may suggest an injection if rest, splints, or oral medicine doesn t relieve your pain. Injecting cortisone is simpler than having surgery. And cortisone may provide the lasting pain relief that can help you get out and enjoy life again.  Getting the injection  Your doctor will start by cleaning and occasionally numbing your skin at the injection site. Next you ll be injected with local anesthetics (for short-term pain relief) and cortisone. The injection may last a few moments. A small bandage will be put over the injection site. You ll then be ready to go home.  After your injection  After being injected, make sure you don t injure the treated region. But stay active. Enjoy a walk or some other mild activity. Just be careful not to strain the region that gave you trouble.  The next day  Some people feel more pain after being injected. This is  normal, and it will go away soon. Applying ice for 20 minutes at a time to your injury may reduce the increased pain. Rest for the first day or two. You don t need to stay in bed. But avoid tasks that may strain the injured region.  If you have diabetes  Cortisone injections can cause blood sugar to be increased for several days after the injection. If you have diabetes, you should follow your blood  sugar closely during this time. Follow your regular plan for what to do when your blood sugar is elevated.     8564-4422 The [facility]. [address] [place] [address]. All rights reserved. This information is not intended as a substitute for professional medical care. Always follow your healthcare professional's instructions.     Surgery Information:   1. Today we discussed surgery, the risks, benefits and alternatives. Risks are infection, bleeding or nerve issues, anesthesia problems and dislocation. All of these are very rare.  2. The surgery would be a right Reverse total shoulder arthroplasty.  This means removing the arthritic parts of the shoulder and replacing it with metal and plastic.  We do the Reverse when the rotator cuff is not functioning.  This means putting the ball side (humerus or arm bone) on the socket side (glenoid) and the socket side on the ball side.  This allows the deltoid muscle to do the work the rotator cuff would usually do, as in raise your arm up.  3. The only main difference between the total shoulder replacement and the reverse total shoulder replacement is often times people with the reverse are not able to get their arm very high above shoulder level, however they can do activity with their arm at shoulder level.  Also the pain is improved just like the total shoulder arthroplasty also.  4. After the surgery will stay overnight with us.  Most people leave the afternoon of the next day.  5. You will have therapy in the hospital, they will teach you exercises you  can do on your own at home after discharge.   6. You will need a Pre Operative History and Physical from Sharri Maya Mai, MD or another provider prior to surgery. This needs to be within 30 days prior to surgery. We can help you set this up.  7.  You will be back to doing a lot of what you are doing now at 3 months after surgery; full recovery would be closer to 1 year.         Reverse Total Shoulder Replacement  Reverse total shoulder replacement is a type of surgery. It s done to repair an injury to the rotator cuff.  Understanding the shoulder joint  The shoulder joint is where the ball-shaped part of the upper arm bone (humerus) meets the cup-shaped socket of the shoulder blade (scapula). A group of muscles and tendons hold the joint together. These muscles and tendons are called the rotator cuff. The muscles let you move your arm and shoulder.  Why reverse total shoulder replacement is done  The surgery may be needed if you have a complete tear of your rotator cuff. The tear may cause long-term problems with your shoulder joint. This is called cuff tear arthropathy. You may need reverse total shoulder replacement surgery if you have any of these:    A complete tear in your rotator cuff    Joint problems from the cuff tear (cuff tear arthropathy)    Previous total shoulder replacement surgery that didn t relieve symptoms    Severe pain and trouble moving your shoulder    No success relieving your symptoms with treatments such as rest, medicines, cortisone injections, or physical therapy  How reverse total shoulder replacement is done  The surgery is the opposite (reverse) of standard total shoulder replacement surgery:    In the standard surgery, the ball of the humerus is replaced with an artificial ball. The socket of the scapula is replaced with an artificial socket. The new joint still uses the rotator cuff muscles to move the arm and shoulder.    With the reverse surgery, the ball of the humerus is replaced  with an artificial socket. The socket of the scapula is replaced with an artificial ball. Since the rotator cuff muscles are damaged, another muscle (deltoid) moves the arm and shoulder.  Risks of reverse total shoulder replacement  Every surgery has risks. Risks for this surgery include:    Infection    Blood loss    Damage to nerves that may make it hard to move your arm    Damage to the humerus or scapula    Artificial joint moving out of position (dislocation)    Problems with general anesthesia  Some risks may be higher if you have had shoulder surgery in the past. Your risks may vary depending on your shoulder problem and your overall health. Talk with your doctor about which risks apply most to you.    1421-1300 [employer]. [address] [place] [address]. All rights reserved. This information is not intended as a substitute for professional medical care. Always follow your healthcare professional's instructions.        After Reverse Total Shoulder Replacement  Reverse total shoulder replacement is a type of surgery. It s done to repair an injury to the rotator cuff. The rotator cuff is a group of muscles and tendons that hold the shoulder joint together.  After your surgery  After the procedure, you will spend several hours in a recovery room. You may be sleepy and confused when you wake up. Your health care team will watch your vital signs, such as your heart rate and breathing. Your arm and shoulder will feel numb if you had regional anesthesia. This will begin to wear off over several hours. You ll be given pain medicine if you need it. You may also be given antibiotic medicine. This is to help prevent infection.  You ll likely be in the hospital for 2 to 3 days. To help lessen pain and swelling, a cooling device may be used on your shoulder. You will likely have follow-up X-rays. These are to make sure the artificial ball and socket are in place.  You may begin physical therapy  in the hospital. This is to help you regain strength and movement.  Recovering at home  Follow all the instructions your health care provider gives you for medicines, exercise, diet, and wound care. Your arm will probably be in a sling for several weeks. You will continue to have some pain as you heal. But any pain you had from the rotator cuff injury and joint damage should be gone.  Continue to use the cooling device on your shoulder. Do physical therapy as instructed. Limit the use of your arm and shoulder as instructed. Your doctor will tell you when you can return to normal activities.  Follow-up care  Make sure to keep all of your follow-up appointments with your surgeon and with your physical therapist. Most joints last for several years before they need to be replaced. Talk with your surgeon about what he or she expects for you.     When to call your health care provider  Call your health care provider right away if any of these occur:    Fever of 100.4 F (38 C) or higher    Redness, swelling, or fluid leaking from your incision that gets worse    Pain that gets worse    Symptoms that don t get better, or get worse    New symptoms        5141-2795 The [employer]. [address] [place] [address]. All rights reserved. This information is not intended as a substitute for professional medical care. Always follow your healthcare professional's instructions.    [employer] and [employer] may offer reliable information regarding your diagnosis and treatment plan.  Exercises for Shoulder Flexibility: External Rotation  This stretch can help restore shoulder flexibility and relieve pain over time. When stretching, be sure to breathe deeply. Follow any special instructions from your doctor or physical therapist:     a doorway. Grasp the doorjamb with the hand on the frozen side. Your arm should be bent.    With the other hand, hold the elbow on the frozen side firmly against your  body.    Standing in the same spot, rotate your body away from the doorjamb. Stop when you feel the stretch in the shoulder. At first, try to hold the stretch for 5 seconds.    Work up to doing 3 sets of this stretch, 3 times a day. Work up to holding the stretch for 30 to 60 seconds.  Note: Keep your arms as still as you can. Over time, rotate your body a little more to enhance the stretch. But be careful not to twist your back.        Exercises for Shoulder Flexibility: Internal Rotation    This stretch can help restore shoulder flexibility and relieve pain over time. When stretching, be sure to breathe deeply. Follow any special instructions from your healthcare provider or physical therapist.    While seated, move the arm on the side you want to stretch toward the middle of your back. The palm of your hand should face out.    Cup your other hand under the hand that s behind your back. Gently push your cupped hand upward until you feel the stretch in the shoulder. Try to hold the stretch for 5 seconds.    Work up to doing 3 sets of this stretch, 3 times a day. Work up to holding the stretch for 30 to 60 seconds.  Note: Keep your back straight. It s OK if your hand can t reach the middle of your back. Instead, start the stretch with your hand as close as you can get it to the middle of your back.      Exercises for Shoulder Flexibility: Wall Walk  Improving your flexibility can reduce pain. Stretching exercises also can help increase your range of pain-free motion. Breathe normally when you exercise. Use smooth, fluid movements.  Note: Follow any special instructions you are given. If you feel pain, stop the exercise. If the pain continues after stopping, call your healthcare provider:    Stand with your shoulder about 2 feet from the wall.    Raise your arm to shoulder level and gently  walk  your fingers up the wall as high as you can.    Hold for a few seconds. Then walk your fingers back down.    Repeat  3 times. Move closer to the wall as you repeat.    Build up to holding each stretch for 30 seconds.  Caution: Do this stretch only if your healthcare provider recommends it. Don t do it when you are first injured.            5217-5731 [employer]. [address] [place] [address]. All rights reserved. This information is not intended as a substitute for professional medical care. Always follow your healthcare professional's instructions.      Shoulder Exercises: External Rotation  Strengthening exercises help make your injured shoulder more stable. To warm up, do flexibility (stretching) exercises first. Your healthcare provider will tell you what size hand weights to use for the strengthening exercise below. If you don t have hand weights, try using cans of soup instead:    Lie on your uninjured side with your head supported by a pillow or your arm. Place a small rolled-up towel under your top elbow.    Grasp a hand weight with your top hand and bend that arm to a right angle, resting your forearm against your stomach.    Keeping your elbow against the towel, slowly lift the weight until your forearm is slightly higher than your elbow. Return to the starting position. Repeat.    Work up to 5 to 15 lifts.    5661-6442 [employer]. [address] [place] [address]. All rights reserved. This information is not intended as a substitute for professional medical care. Always follow your healthcare professional's instructions.        Shoulder Exercises: Internal Rotation    Strengthening exercises help make your injured shoulder more stable. To warm up, do flexibility (stretching) exercises first. Your healthcare provider will tell you what size hand weights to use for the strengthening exercise below. If you don t have hand weights, try using cans of soup instead:    With knees bent, lie on a firm surface. Using the hand on the same side as your injured shoulder, grasp a  weight. Bend that arm to a right angle (90 degrees).    Rest your elbow on the floor.    Keeping your elbow next to your side, lower your forearm toward the floor, away from your body. Do not lower your hand all the way to the floor.    Slowly return your forearm to your side. Repeat.    Work up to 5 to 15 lifts.     Note: Support your head and neck with a pillow.     3233-7912 [employer]. [address] [place] [address]. All rights reserved. This information is not intended as a substitute for professional medical care. Always follow your healthcare professional's instructions.        Shoulder Exercises: Side Raise    This exercise stretches and strengthens your shoulders. Before starting, read through all the instructions. During the exercise, breathe normally and use smooth movements. Stop if you feel any pain. If pain persists, call your healthcare provider.    Stand straight, holding a ____ pound weight in each hand, arms at sides, feet shoulder-width apart.    Slowly extend your arms up and out until weights are at shoulder level. Slowly return to starting position.    Repeat ____ times. Do ____ sets ____ times a day.     CAUTION: Don t swing the weights or raise weights above shoulder level.     1335-7792 [employer]. [address] [place] [address]. All rights reserved. This information is not intended as a substitute for professional medical care. Always follow your healthcare professional's instructions.      THANK YOU for coming in today. If you receive a survey via [employer] or mail please let us know if there was anything you especially appreciated today or if there is any way we can improve our clinic. We appreciate your input.    GENERAL INFORMATION:  Our hours are:  Monday :     Clinic 7:30 AM-430 PM ([facility])  Tuesday:      Operating Room All Day ([facility])  Wednesday: Clinic 7:30 AM - 11:15 AM ([facility])             Clinic 1:00 PM - 4:00PM ([facility])  Thursday:     Administrative Day  Friday:          Clinic 7:30 AM - 11:15 AM ([facility])            Clinic 1:00 PM - 4:00 PM ([facility])    [facility] for any issues or concerns: [phone]      We are not in the office Thursdays. Therefore non- urgent calls and medical messages received on Thursday will be addressed when we are back in the office on Wednesday. Urgent matters will be reviewed and addressed by one of our partners in the office as needed.    If lab work was done today as part of your evaluation you will generally be contacted via [place] [facility], mail, or phone with the results within 1-5 days. If there is an alarming result we will contact you by phone. Lab results come back at varying times, I generally wait until all labs are resulted before making comments on results. Please note labs are automatically released to [place] [facility] (if you have signed up for it) once available-at times you may see these prior to my having a chance to review them as well.    If you need refills please contact your pharmacist. They will send a refill request to me to review. Please allow 3 business days for us to process all refill requests. All narcotic refills should be handled in the clinic at the time of your visit.            Follow-ups after your visit        Who to contact     If you have questions or need follow up information about today's clinic visit or your schedule please contact [facility] directly at [phone].  Normal or non-critical lab and imaging results will be communicated to you by MyChart, letter or phone within 4 business days after the clinic has received the results. If you do not hear from us within 7 days, please contact the clinic through [place] [facility] or phone. If you have a critical or abnormal lab result, we will notify you by phone as soon as  "possible.  Submit refill requests through [employer] or call your pharmacy and they will forward the refill request to us. Please allow 3 business days for your refill to be completed.          Additional Information About Your Visit        [employer] intelligence Information     [employer] lets you send messages to your doctor, view your test results, renew your prescriptions, schedule appointments and more. To sign up, go to [email or web address] . Click on \"Log in\" on the left side of the screen, which will take you to the Welcome page. Then click on \"Sign up Now\" on the right side of the page.     You will be asked to enter the access code listed below, as well as some personal information. Please follow the directions to create your username and password.     Your access code is: [email or web address]  Expires: 2018 10:35 PM     Your access code will  in 90 days. If you need help or a new code, please call your [place] clinic or [phone].        Care EveryWhere ID     This is your Care EveryWhere ID. This could be used by other organizations to access your [place] medical records  [id number]        Your Vitals Were     Temperature Height BMI (Body Mass Index)             97.8  F (36.6  C) (Temporal) 1.803 m (5' 11\") 26.22 kg/m2          Blood Pressure from Last 3 Encounters:   18 130/70   18 115/74   05/10/18 149/84    Weight from Last 3 Encounters:   18 85.3 kg (188 lb)   18 84.8 kg (187 lb)   17 85.9 kg (189 lb 6 oz)              Today, you had the following     No orders found for display       Primary Care Provider Office Phone # Fax #    Sharri Maya Mai, [phone]       [address] [place] [address]        Equal Access to Services     MARGARETH ROCHA : Killian Landrum, boy arteaga, qadesi dwyer. [facility] [phone].    ATENCIÓN: Si habla lexi, tiene a garcia disposición servicios gratuitos de " asistencia lingüística. Ayana al [phone].    We comply with applicable federal civil rights laws and [place] laws. We do not discriminate on the basis of race, color, national origin, age, disability, sex, sexual orientation, or gender identity.            Thank you!     Thank you for choosing [facility]  for your care. Our goal is always to provide you with excellent care. Hearing back from our patients is one way we can continue to improve our services. Please take a few minutes to complete the written survey that you may receive in the mail after your visit with us. Thank you!             Your Updated Medication List - Protect others around you: Learn how to safely use, store and throw away your medicines at www.disposemymeds.org.          This list is accurate as of 5/23/18  5:09 PM.  Always use your most recent med list.                   Brand Name Dispense Instructions for use Diagnosis    aspirin 81 MG tablet     30 tablet    Take 1 tablet (81 mg) by mouth daily    Atypical chest pain       atorvastatin 20 MG tablet    LIPITOR    90 tablet    Take 1 tablet (20 mg) by mouth daily    Abnormal cardiovascular stress test       calcium carbonate 500 MG tablet    OS-QUIANA 500 mg [place]. Ca     Take 1 tablet by mouth daily        cinnamon 500 MG Tabs           IRON SUPPLEMENT PO      Take 325 mg by mouth daily        lisinopril 5 MG tablet    PRINIVIL/ZESTRIL    30 tablet    Take 1 tablet (5 mg) by mouth daily Must follow up before med runs out    Essential hypertension       MAGNESIUM OXIDE PO      Take 200 mg by mouth daily        MULTIPLE VITAMIN PO      Take 1 tablet by mouth daily        NAPROXEN PO      Take 250 mg by mouth 2 times daily (with meals)        nitroGLYcerin 0.4 MG sublingual tablet    NITROSTAT    25 tablet    For chest pain place 1 tablet under the tongue every 5 minutes for 3 doses. If symptoms persist 5 minutes after 1st dose call 911.    Atypical chest pain       omega 3  1000 MG Caps     90 capsule    Take 1 g by mouth daily        PROBIOTIC DAILY PO      Take 1 tablet by mouth daily        RANITIDINE HCL PO      Take 150 mg by mouth daily        vitamin b complex w/vitamin C Tabs tablet      Take 1 tablet by mouth daily        VITAMIN C PO      Take 1,000 mg by mouth daily

## 2018-05-24 RX ORDER — LISINOPRIL 5 MG/1
TABLET ORAL
Qty: 30 TABLET | Refills: 5 | Status: SHIPPED | OUTPATIENT
Start: 2018-05-24 | End: 2018-10-09

## 2018-05-24 NOTE — TELEPHONE ENCOUNTER
Prescription approved per [facility] Refill Protocol.............SARI York

## 2018-06-05 ENCOUNTER — OFFICE VISIT (OUTPATIENT)
Dept: ORTHOPEDICS | Facility: CLINIC | Age: 68
End: 2018-06-05
Payer: COMMERCIAL

## 2018-06-05 ENCOUNTER — RADIANT APPOINTMENT (OUTPATIENT)
Dept: GENERAL RADIOLOGY | Facility: CLINIC | Age: 68
End: 2018-06-05
Attending: PHYSICAL MEDICINE & REHABILITATION
Payer: COMMERCIAL

## 2018-06-05 VITALS
SYSTOLIC BLOOD PRESSURE: 130 MMHG | HEIGHT: 71 IN | WEIGHT: 187 LBS | BODY MASS INDEX: 26.18 KG/M2 | DIASTOLIC BLOOD PRESSURE: 88 MMHG

## 2018-06-05 DIAGNOSIS — M54.41 CHRONIC BILATERAL LOW BACK PAIN WITH BILATERAL SCIATICA: Primary | ICD-10-CM

## 2018-06-05 DIAGNOSIS — M54.42 CHRONIC BILATERAL LOW BACK PAIN WITH BILATERAL SCIATICA: Primary | ICD-10-CM

## 2018-06-05 DIAGNOSIS — R20.0 NUMBNESS OF FEET: ICD-10-CM

## 2018-06-05 DIAGNOSIS — G89.29 CHRONIC BILATERAL LOW BACK PAIN WITH BILATERAL SCIATICA: Primary | ICD-10-CM

## 2018-06-05 PROCEDURE — 72100 X-RAY EXAM L-S SPINE 2/3 VWS: CPT | Mod: TC

## 2018-06-05 PROCEDURE — 99203 OFFICE O/P NEW LOW 30 MIN: CPT | Performed by: PHYSICAL MEDICINE & REHABILITATION

## 2018-06-05 NOTE — PROGRESS NOTES
Sports Medicine Clinic Visit    PCP: Sharri Bonner    CC: Patient presents with:  Back Pain      HPI:  Dion López is a 67 year old male who is seen as a self referral.   He notes bilateral leg numbness that began 6 months ago with insidious onset. He denies low back pain currently, but sometimes he does.  Symptoms are relieved with movement and naproxen.  Symptoms are worsened by sitting and in the morning. He endorses numbness and weakness in the back.   He denies swelling, bruising, popping, grinding, catching, locking, instability, tingling, pain in other joints and fever, chills.  Other treatment has included physical therapy for the back several years ago.  He has not had any epidural steroid injections.      He also notes neck pain and dizziness with bending this neck down.      Review of Systems:  Musculoskeletal: as above  Remainder of review of systems is negative including constitutional, eyes, ENT, CV, pulmonary, GI, , endocrine, skin, hematologic, and neurologic except as noted in HPI or medical history.    History reviewed. No pertinent past surgical/medical/family/social history other than as mentioned in HPI.    Patient Active Problem List   Diagnosis     Counseling regarding advanced directives     Essential hypertension     Arthropathy of shoulder right     Past Surgical History:   Procedure Laterality Date     AMPUTATION FINGER/THUMB       NO HISTORY OF SURGERY       Family History   Problem Relation Age of Onset     Coronary Artery Disease Mother 55     Hypertension Mother      Other Cancer Father      lung cancer     Unknown/Adopted Maternal Grandmother      Unknown/Adopted Maternal Grandfather      Unknown/Adopted Paternal Grandmother      Unknown/Adopted Paternal Grandfather      Substance Abuse Brother      alcoholism     Other Cancer Sister      throat cancer     Seizure Disorder Brother      Hypertension Sister      Social History     Social History     Marital status:      Spouse  "name: N/A     Number of children: N/A     Years of education: N/A     Occupational History     Not on file.     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    Current Outpatient Prescriptions   Medication     Ascorbic Acid (VITAMIN C PO)     atorvastatin (LIPITOR) 20 MG tablet     B Complex-C (VITAMIN B COMPLEX W/VITAMIN C) TABS tablet     calcium carbonate (OS-QUIANA 500 MG [place]. [place]) 1250 MG tablet     cinnamon 500 MG TABS     Ferrous Sulfate (IRON SUPPLEMENT PO)     MAGNESIUM OXIDE PO     MULTIPLE VITAMIN PO     NAPROXEN PO     omega 3 1000 MG CAPS     aspirin 81 MG tablet     lisinopril (PRINIVIL/ZESTRIL) 5 MG tablet     lisinopril (PRINIVIL/ZESTRIL) 5 MG tablet     nitroGLYcerin (NITROSTAT) 0.4 MG sublingual tablet     Probiotic Product (PROBIOTIC DAILY PO)     RANITIDINE HCL PO     No current facility-administered medications for this visit.      No Known Allergies      Objective:  /88  Ht 5' 11\" (1.803 m)  Wt 187 lb (84.8 kg)  BMI 26.08 kg/m2    General: Alert and in no distress    Head: Normocephalic, atraumatic  Eyes: no scleral icterus or conjunctival erythema   Oropharynx:  Mucous membranes moist  Skin: no erythema, petechiae, or jaundice  CV: regular rhythm by palpation, 2+ distal pulses  Resp: normal respiratory effort without conversational dyspnea   Psych: normal mood and affect    Gait: Non-antalgic, appropriate coordination and balance   Neuro: Motor strength and sensation as noted below    Musculoskeletal:    Low back exam:    Inspection:     no visible deformity in the low back       normal skin       normal vascular       normal lymphatic       no asymmetry    ROM: Decreased and painful " extension.  Right sided rotation painful.  Left lateral flexion decreased but not painful.    Strength:  5/5 hip flexion/abduction/adduction, knee flexion/extension, ankle dorsiflexion/plantarflexion, great toe extension, toe flexion    Sensation:  Altered sensation to light touch over the left lateral lower leg and foot (plantar and dorsal surfaces)  Altered sensation to light touch over the right great toe (plantar and dorsal surfaces)    Radiology:  Independent visualization of images performed.    LUMBAR SPINE 2-3 VIEWS   6/5/2018 11:33 AM      HISTORY: Numbness of feet.     COMPARISON: Lumbar spine 4/7/2016.         IMPRESSION: No acute fractures identified. Increasing sclerosis at the  L2-L3 disc level indicating progression of degenerative disc disease.  Increasing sclerosis at the superior and inferior endplates of L4 as  well also consistent with increasing degenerative disc disease.    MRI LUMBAR SPINE WITHOUT CONTRAST  4/14/2016 12:52 PM      HISTORY: Low back pain.     TECHNIQUE: Multiplanar, multisequence MRI of the lumbar spine without  contrast.     COMPARISON: X-ray from 4/7/2016.     FINDINGS: Comparison x-ray demonstrates 5 lumbar type vertebra.  Sagittal images demonstrate normal vertebral body height. Bone marrow  signal is unremarkable. Tip of the conus medullaris and cauda equina  are unremarkable.       T12-L1: No disc herniation or stenosis. Facet joints are unremarkable.     L1-L2: Broad-based disc bulge. Mild to moderate central stenosis.  Neural foramen are patent. In addition there is a large left central  disc extrusion that extends inferiorly approximately 1.6 cm in the  lateral recess of L2 with contact and compression of the descending  left L2 nerve root. This extends to near the origin of the left L2-L3  foramen.     L2-L3: Left disc extrusion from L1-L2 extends to just proximal to the  left L2-L3 neural foramen. There is advanced degenerative disc disease  at L2-L3 with mild to  moderate disc bulge resulting in mild to  moderate central stenosis. Mild to moderate left foraminal stenosis.  Facet joints are unremarkable.     L3-L4: Degenerative disc disease with broad-based disc bulge. Facet  joints are unremarkable. Moderate to severe central stenosis. This  lateralizes to the right and results in moderate right foraminal  stenosis. Mild to moderate left foraminal stenosis.     L4-L5: Advanced degenerative disc disease. Mild facet degenerative  changes. Moderate to severe if not severe central stenosis. Severe  right and moderate left foraminal stenosis as a result of asymmetric  disc bulge.     L5-S1: Broad-based disc bulge. Mild facet hypertrophy. No central  stenosis. Moderate to severe if not severe left foraminal stenosis.  Moderate right foraminal stenosis.     Paraspinous soft tissues: Unremarkable.         IMPRESSION:   1. At L1-L2 there is broad-based disc bulge with mild to moderate  central stenosis. In addition there is a large left central disc  extrusion that extends inferiorly approximately 1.6 cm with contact  and compression of the descending left L2 nerve root. This extends to  just above the level of the L2-L3 left neural foramen.  2. At L2-L3 there is mild to moderate central and left foraminal  stenosis.  3. At L3-L4 there is moderate to severe central stenosis. Moderate  right and mild to moderate left foraminal stenosis.  4. At L4-L5 there is moderate to severe if not severe central  stenosis. Severe right foraminal stenosis. Moderate left foraminal  stenosis.  5. At L5-S1 there is moderate to severe if not severe left foraminal  stenosis. Moderate right foraminal stenosis.         ALEXX ANDREW MD    Assessment:  1. Chronic bilateral low back pain with bilateral sciatica        Plan:  Discussed the assessment with the patient and developed a plan together:  -Reviewed lumbar MRI from 2016 with Dion.  Shows multilevel degenerative disc disease.  Radiographs today  suggest it may be progressing.  Discussed oral medication management, physical therapy, epidural steroid injections, repeat lumbar MRI, and referral to spine surgery.  He is not interested in a spine surgery referral.  He would like to try an epidural steroid injection at [facility].  Order placed.    -Ice or heat 15-20 minutes as needed (Avoid sleeping on a heating pad or ice)  -Patient's preferred over the counter medications as directed on packaging as needed for pain or soreness.  Please take ibuprofen with food. Do not premedicate prior to activity.  -Avoid aggravating activities.    Follow Up: 4 weeks or sooner if symptoms fail to improve or worsen.  Please call with any questions or concerns.       Fidelia Henderson MD, CAQ  [facility]

## 2018-06-05 NOTE — LETTER
6/5/2018         RE: Dion López  [address] [place] [address]        Dear Colleague,    Thank you for referring your patient, Dion López, to the [facility]. Please see a copy of my visit note below.    Sports Medicine Clinic Visit    PCP: Sharri Bonner    CC: Patient presents with:  Back Pain      HPI:  Dion López is a 67 year old male who is seen as a self referral.   He notes bilateral leg numbness that began 6 months ago with insidious onset. He denies low back pain currently, but sometimes he does.  Symptoms are relieved with movement and naproxen.  Symptoms are worsened by sitting and in the morning. He endorses numbness and weakness in the back.   He denies swelling, bruising, popping, grinding, catching, locking, instability, tingling, pain in other joints and fever, chills.  Other treatment has included physical therapy for the back several years ago.  He has not had any epidural steroid injections.      He also notes neck pain and dizziness with bending this neck down.      Review of Systems:  Musculoskeletal: as above  Remainder of review of systems is negative including constitutional, eyes, ENT, CV, pulmonary, GI, , endocrine, skin, hematologic, and neurologic except as noted in HPI or medical history.    History reviewed. No pertinent past surgical/medical/family/social history other than as mentioned in HPI.    Patient Active Problem List   Diagnosis     Counseling regarding advanced directives     Essential hypertension     Arthropathy of shoulder right     Past Surgical History:   Procedure Laterality Date     AMPUTATION FINGER/THUMB       NO HISTORY OF SURGERY       Family History   Problem Relation Age of Onset     Coronary Artery Disease Mother 55     Hypertension Mother      Other Cancer Father      lung cancer     Unknown/Adopted Maternal Grandmother      Unknown/Adopted Maternal Grandfather      Unknown/Adopted Paternal Grandmother      Unknown/Adopted  "Paternal Grandfather      Substance Abuse Brother      alcoholism     Other Cancer Sister      throat cancer     Seizure Disorder Brother      Hypertension Sister      Social History     Social History     Marital status:      Spouse name: N/A     Number of children: N/A     Years of education: N/A     Occupational History     Not on file.     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    Current Outpatient Prescriptions   Medication     Ascorbic Acid (VITAMIN C PO)     atorvastatin (LIPITOR) 20 MG tablet     B Complex-C (VITAMIN B COMPLEX W/VITAMIN C) TABS tablet     calcium carbonate (OS-QUIANA 500 MG [place]. [place]) 1250 MG tablet     cinnamon 500 MG TABS     Ferrous Sulfate (IRON SUPPLEMENT PO)     MAGNESIUM OXIDE PO     MULTIPLE VITAMIN PO     NAPROXEN PO     omega 3 1000 MG CAPS     aspirin 81 MG tablet     lisinopril (PRINIVIL/ZESTRIL) 5 MG tablet     lisinopril (PRINIVIL/ZESTRIL) 5 MG tablet     nitroGLYcerin (NITROSTAT) 0.4 MG sublingual tablet     Probiotic Product (PROBIOTIC DAILY PO)     RANITIDINE HCL PO     No current facility-administered medications for this visit.      No Known Allergies      Objective:  /88  Ht 5' 11\" (1.803 m)  Wt 187 lb (84.8 kg)  BMI 26.08 kg/m2    General: Alert and in no distress    Head: Normocephalic, atraumatic  Eyes: no scleral icterus or conjunctival erythema   Oropharynx:  Mucous membranes moist  Skin: no erythema, petechiae, or jaundice  CV: regular rhythm by palpation, 2+ distal pulses  Resp: normal respiratory effort without conversational dyspnea   Psych: normal mood and affect    Gait: Non-antalgic, appropriate coordination and balance   Neuro: Motor " strength and sensation as noted below    Musculoskeletal:    Low back exam:    Inspection:     no visible deformity in the low back       normal skin       normal vascular       normal lymphatic       no asymmetry    ROM: Decreased and painful extension.  Right sided rotation painful.  Left lateral flexion decreased but not painful.    Strength:  5/5 hip flexion/abduction/adduction, knee flexion/extension, ankle dorsiflexion/plantarflexion, great toe extension, toe flexion    Sensation:  Altered sensation to light touch over the left lateral lower leg and foot (plantar and dorsal surfaces)  Altered sensation to light touch over the right great toe (plantar and dorsal surfaces)    Radiology:  Independent visualization of images performed.    LUMBAR SPINE 2-3 VIEWS   6/5/2018 11:33 AM      HISTORY: Numbness of feet.     COMPARISON: Lumbar spine 4/7/2016.         IMPRESSION: No acute fractures identified. Increasing sclerosis at the  L2-L3 disc level indicating progression of degenerative disc disease.  Increasing sclerosis at the superior and inferior endplates of L4 as  well also consistent with increasing degenerative disc disease.    MRI LUMBAR SPINE WITHOUT CONTRAST  4/14/2016 12:52 PM      HISTORY: Low back pain.     TECHNIQUE: Multiplanar, multisequence MRI of the lumbar spine without  contrast.     COMPARISON: X-ray from 4/7/2016.     FINDINGS: Comparison x-ray demonstrates 5 lumbar type vertebra.  Sagittal images demonstrate normal vertebral body height. Bone marrow  signal is unremarkable. Tip of the conus medullaris and cauda equina  are unremarkable.       T12-L1: No disc herniation or stenosis. Facet joints are unremarkable.     L1-L2: Broad-based disc bulge. Mild to moderate central stenosis.  Neural foramen are patent. In addition there is a large left central  disc extrusion that extends inferiorly approximately 1.6 cm in the  lateral recess of L2 with contact and compression of the descending  left L2  nerve root. This extends to near the origin of the left L2-L3  foramen.     L2-L3: Left disc extrusion from L1-L2 extends to just proximal to the  left L2-L3 neural foramen. There is advanced degenerative disc disease  at L2-L3 with mild to moderate disc bulge resulting in mild to  moderate central stenosis. Mild to moderate left foraminal stenosis.  Facet joints are unremarkable.     L3-L4: Degenerative disc disease with broad-based disc bulge. Facet  joints are unremarkable. Moderate to severe central stenosis. This  lateralizes to the right and results in moderate right foraminal  stenosis. Mild to moderate left foraminal stenosis.     L4-L5: Advanced degenerative disc disease. Mild facet degenerative  changes. Moderate to severe if not severe central stenosis. Severe  right and moderate left foraminal stenosis as a result of asymmetric  disc bulge.     L5-S1: Broad-based disc bulge. Mild facet hypertrophy. No central  stenosis. Moderate to severe if not severe left foraminal stenosis.  Moderate right foraminal stenosis.     Paraspinous soft tissues: Unremarkable.         IMPRESSION:   1. At L1-L2 there is broad-based disc bulge with mild to moderate  central stenosis. In addition there is a large left central disc  extrusion that extends inferiorly approximately 1.6 cm with contact  and compression of the descending left L2 nerve root. This extends to  just above the level of the L2-L3 left neural foramen.  2. At L2-L3 there is mild to moderate central and left foraminal  stenosis.  3. At L3-L4 there is moderate to severe central stenosis. Moderate  right and mild to moderate left foraminal stenosis.  4. At L4-L5 there is moderate to severe if not severe central  stenosis. Severe right foraminal stenosis. Moderate left foraminal  stenosis.  5. At L5-S1 there is moderate to severe if not severe left foraminal  stenosis. Moderate right foraminal stenosis.         ALEXX ANDREW MD    Assessment:  1. Chronic  bilateral low back pain with bilateral sciatica        Plan:  Discussed the assessment with the patient and developed a plan together:  -Reviewed lumbar MRI from 2016 with Dion.  Shows multilevel degenerative disc disease.  Radiographs today suggest it may be progressing.  Discussed oral medication management, physical therapy, epidural steroid injections, repeat lumbar MRI, and referral to spine surgery.  He is not interested in a spine surgery referral.  He would like to try an epidural steroid injection at [facility].  Order placed.    -Ice or heat 15-20 minutes as needed (Avoid sleeping on a heating pad or ice)  -Patient's preferred over the counter medications as directed on packaging as needed for pain or soreness.  Please take ibuprofen with food. Do not premedicate prior to activity.  -Avoid aggravating activities.    Follow Up: 4 weeks or sooner if symptoms fail to improve or worsen.  Please call with any questions or concerns.       Fidelia Henderson MD, [facility]      Again, thank you for allowing me to participate in the care of your patient.        Sincerely,        Viviane Henderson MD

## 2018-06-05 NOTE — PATIENT INSTRUCTIONS
Today's Plan of Care:  -Epidural Steroid injection done at [facility]. Scheduling will call you.    -We also discussed other future treatment options:  Physical therapy and referral to spine surgery    Follow Up: 4 weeks. Please call with any questions or concerns.

## 2018-06-05 NOTE — MR AVS SNAPSHOT
"              After Visit Summary   6/5/2018    Dion López    MRN: [id number]           Patient Information     Date Of Birth          1950        Visit Information        Provider Department      6/5/2018 11:00 AM Viviane Henderson MD [facility]        Today's Diagnoses     Numbness of feet    -  1      Care Instructions    Today's Plan of Care:  -Epidural Steroid injection done at [facility]. Scheduling will call you.    -We also discussed other future treatment options:  Physical therapy and referral to spine surgery    Follow Up: 4 weeks. Please call with any questions or concerns.               Follow-ups after your visit        Who to contact     If you have questions or need follow up information about today's clinic visit or your schedule please contact [facility] directly at [phone].  Normal or non-critical lab and imaging results will be communicated to you by MyChart, letter or phone within 4 business days after the clinic has received the results. If you do not hear from us within 7 days, please contact the clinic through MyChart or phone. If you have a critical or abnormal lab result, we will notify you by phone as soon as possible.  Submit refill requests through [employer] or call your pharmacy and they will forward the refill request to us. Please allow 3 business days for your refill to be completed.          Additional Information About Your Visit        MyChart Information     [employer] lets you send messages to your doctor, view your test results, renew your prescriptions, schedule appointments and more. To sign up, go to [email or web address] . Click on \"Log in\" on the left side of the screen, which will take you to the Welcome page. Then click on \"Sign up Now\" on the right side of the page.     You will be asked to enter the access code listed below, as well as some personal information. Please follow the directions to create your username and " "password.     Your access code is: [email or web address]  Expires: 2018 10:35 PM     Your access code will  in 90 days. If you need help or a new code, please call your [facility] clinic or [phone].        Care EveryWhere ID     This is your Care EveryWhere ID. This could be used by other organizations to access your [facility] medical records  [id number]        Your Vitals Were     Height BMI (Body Mass Index)                5' 11\" (1.803 m) 26.08 kg/m2           Blood Pressure from Last 3 Encounters:   18 130/88   18 130/70   18 115/74    Weight from Last 3 Encounters:   18 187 lb (84.8 kg)   18 188 lb (85.3 kg)   18 187 lb (84.8 kg)               Primary Care Provider Office Phone # Fax #    [facility] Maya Mai, [phone] [address] [place] [address]        Equal Access to Services     [facility]: Hadii anjel ku hadasho Soomaali, waaxda luqadaha, qaybta kaalmada adeegyada, desi rascon . So [facility] [phone].    ATENCIÓN: Si habla español, tiene a garcia disposición servicios gratuitos de asistencia lingüística. Llame al [phone].    We comply with applicable federal civil rights laws and [place] laws. We do not discriminate on the basis of race, color, national origin, age, disability, sex, sexual orientation, or gender identity.            Thank you!     Thank you for choosing [facility]  for your care. Our goal is always to provide you with excellent care. Hearing back from our patients is one way we can continue to improve our services. Please take a few minutes to complete the written survey that you may receive in the mail after your visit with us. Thank you!             Your Updated Medication List - Protect others around you: Learn how to safely use, store and throw away your medicines at www.disposemymeds.org.          This list is accurate as of 18 12:04 PM.  Always use your most recent med " list.                   Brand Name Dispense Instructions for use Diagnosis    aspirin 81 MG tablet     30 tablet    Take 1 tablet (81 mg) by mouth daily    Atypical chest pain       atorvastatin 20 MG tablet    LIPITOR    90 tablet    Take 1 tablet (20 mg) by mouth daily    Abnormal cardiovascular stress test       calcium carbonate 500 MG tablet    OS-QUIANA 500 mg [place]. Ca     Take 1 tablet by mouth daily        cinnamon 500 MG Tabs           IRON SUPPLEMENT PO      Take 325 mg by mouth daily        * lisinopril 5 MG tablet    PRINIVIL/ZESTRIL    30 tablet    Take 1 tablet (5 mg) by mouth daily Must follow up before med runs out    Essential hypertension       * lisinopril 5 MG tablet    PRINIVIL/ZESTRIL    30 tablet    TAKE ONE TABLET BY MOUTH ONCE DAILY    Benign essential hypertension       MAGNESIUM OXIDE PO      Take 200 mg by mouth daily        MULTIPLE VITAMIN PO      Take 1 tablet by mouth daily        NAPROXEN PO      Take 250 mg by mouth 2 times daily (with meals)        nitroGLYcerin 0.4 MG sublingual tablet    NITROSTAT    25 tablet    For chest pain place 1 tablet under the tongue every 5 minutes for 3 doses. If symptoms persist 5 minutes after 1st dose call 911.    Atypical chest pain       omega 3 1000 MG Caps     90 capsule    Take 1 g by mouth daily        PROBIOTIC DAILY PO      Take 1 tablet by mouth daily        RANITIDINE HCL PO      Take 150 mg by mouth daily        vitamin b complex w/vitamin C Tabs tablet      Take 1 tablet by mouth daily        VITAMIN C PO      Take 1,000 mg by mouth daily        * Notice:  This list has 2 medication(s) that are the same as other medications prescribed for you. Read the directions carefully, and ask your doctor or other care provider to review them with you.

## 2018-06-09 ENCOUNTER — TRANSFERRED RECORDS (OUTPATIENT)
Dept: HEALTH INFORMATION MANAGEMENT | Facility: CLINIC | Age: 68
End: 2018-06-09

## 2018-06-19 ENCOUNTER — TELEPHONE (OUTPATIENT)
Dept: FAMILY MEDICINE | Facility: CLINIC | Age: 68
End: 2018-06-19

## 2018-06-19 ENCOUNTER — TELEPHONE (OUTPATIENT)
Dept: SURGERY | Facility: CLINIC | Age: 68
End: 2018-06-19

## 2018-06-19 DIAGNOSIS — Z12.11 SPECIAL SCREENING FOR MALIGNANT NEOPLASMS, COLON: Primary | ICD-10-CM

## 2018-06-19 NOTE — TELEPHONE ENCOUNTER
Spoke with the patient and informed him that the [facility] order was placed for [facility]. I provided the scheduling number to [facility] for him. He expressed understanding and was thankful for the call.    Martha Jackman M.Ed., ATR, ATC

## 2018-06-19 NOTE — TELEPHONE ENCOUNTER
Patient is calling asking if this has been addressed.  Thank you,  Jennifer Esquivel   for [facility]

## 2018-06-19 NOTE — TELEPHONE ENCOUNTER
Patient called to schedule an injection. I see it was requested that he schedules an epidural steroid injection but there is not an actual order for this. Could we get one placed? Also he is wanting this done at a facility closer to him. He said either [facility] or somewhere in [place]. Could you help me with this? Thank you so much!

## 2018-06-19 NOTE — TELEPHONE ENCOUNTER
Reason for Call: Request for an order or referral:    Order or referral being requested: colonoscopy     Date needed: at your convenience    Has the patient been seen by the PCP for this problem? Not Applicable    Additional comments: patient got a call from his insurance stating that its about time to do this, needs order placed    Phone number Patient can be reached at:  Home number on file [phone] (home)    Best Time:  any    Can we leave a detailed message on this number?  YES    Call taken on 6/19/2018 at 12:17 PM by Mari Constantino

## 2018-06-19 NOTE — TELEPHONE ENCOUNTER
Patient needs a referral for CT colonography . He got a letter brochure in mail from insurance company stated its time to get this done and to get it done before he is 74 years.

## 2018-06-20 ENCOUNTER — HOSPITAL ENCOUNTER (OUTPATIENT)
Facility: CLINIC | Age: 68
End: 2018-06-20
Attending: SURGERY | Admitting: SURGERY
Payer: COMMERCIAL

## 2018-07-03 ENCOUNTER — TRANSFERRED RECORDS (OUTPATIENT)
Dept: HEALTH INFORMATION MANAGEMENT | Facility: CLINIC | Age: 68
End: 2018-07-03

## 2018-07-09 ENCOUNTER — OFFICE VISIT (OUTPATIENT)
Dept: ORTHOPEDICS | Facility: OTHER | Age: 68
End: 2018-07-09
Payer: COMMERCIAL

## 2018-07-09 ENCOUNTER — TELEPHONE (OUTPATIENT)
Dept: FAMILY MEDICINE | Facility: OTHER | Age: 68
End: 2018-07-09

## 2018-07-09 VITALS
SYSTOLIC BLOOD PRESSURE: 128 MMHG | DIASTOLIC BLOOD PRESSURE: 82 MMHG | WEIGHT: 185 LBS | HEIGHT: 71 IN | BODY MASS INDEX: 25.9 KG/M2

## 2018-07-09 DIAGNOSIS — G89.29 CHRONIC BILATERAL LOW BACK PAIN WITH BILATERAL SCIATICA: Primary | ICD-10-CM

## 2018-07-09 DIAGNOSIS — M54.42 CHRONIC BILATERAL LOW BACK PAIN WITH BILATERAL SCIATICA: Primary | ICD-10-CM

## 2018-07-09 DIAGNOSIS — M54.41 CHRONIC BILATERAL LOW BACK PAIN WITH BILATERAL SCIATICA: Primary | ICD-10-CM

## 2018-07-09 PROCEDURE — 99214 OFFICE O/P EST MOD 30 MIN: CPT | Performed by: PHYSICAL MEDICINE & REHABILITATION

## 2018-07-09 NOTE — TELEPHONE ENCOUNTER
LVM stating I would try back tomorrow.     MRI ordered today is through [facility]. Dr. Henderson recommended an updated MRI before completing the ABRAHAM. ABRAHAM was previously ordered through [facility] so that he would be able to get in quicker.     Martha Jackman M.Ed., ATR, ATC

## 2018-07-09 NOTE — LETTER
7/9/2018         RE: Dion López  [address] [place] [address]        Dear Colleague,    Thank you for referring your patient, Dion López, to the [facility]. Please see a copy of my visit note below.    Sports Medicine Clinic Visit - Interim History July 9, 2018    Initial Visit Date 6/5/2018    PCP: Sharri Bonner    Dion López is a 67 year old male who is seen in follow up for Chronic bilateral low back pain with bilateral sciatica. Since last visit on 6/5/18, patient has continued to have back pain. He did not obtain the epidural steroid injection. He states that they did not receive a call from them to schedule. He reports that he has had worsening weakness in his legs, left is worse than the right. He notes numbness in the left foot and right great toe. He rates the pain at a  3/10 currently.  Symptoms are relieved with stretching exercises and walking at work.  Symptoms are worsened in the morning.       Review of Systems  Musculoskeletal: as above  Remainder of review of systems is negative including constitutional, eyes, ENT, CV, pulmonary, GI, , endocrine, skin, hematologic, and neurologic except as noted in HPI or medical history.    History reviewed. No pertinent past surgical/medical/family/social history other than as mentioned in HPI.    Patient Active Problem List   Diagnosis     Counseling regarding advanced directives     Essential hypertension     Arthropathy of shoulder right     Past Medical History:   Diagnosis Date     NO ACTIVE PROBLEMS      Past Surgical History:   Procedure Laterality Date     AMPUTATION FINGER/THUMB       NO HISTORY OF SURGERY       Family History   Problem Relation Age of Onset     Coronary Artery Disease Mother 55     Hypertension Mother      Other Cancer Father      lung cancer     Unknown/Adopted Maternal Grandmother      Unknown/Adopted Maternal Grandfather      Unknown/Adopted Paternal Grandmother      Unknown/Adopted Paternal  Grandfather      Substance Abuse Brother      alcoholism     Other Cancer Sister      throat cancer     Seizure Disorder Brother      Hypertension Sister      Social History     Social History     Marital status:      Spouse name: N/A     Number of children: N/A     Years of education: N/A     Occupational History     Not on file.     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 [place]) 1250 MG tablet     cinnamon 500 MG TABS     Ferrous Sulfate (IRON SUPPLEMENT PO)     lisinopril (PRINIVIL/ZESTRIL) 5 MG tablet     lisinopril (PRINIVIL/ZESTRIL) 5 MG tablet     MAGNESIUM OXIDE PO     MULTIPLE VITAMIN PO     NAPROXEN PO     nitroGLYcerin (NITROSTAT) 0.4 MG sublingual tablet     omega 3 1000 MG CAPS     Probiotic Product (PROBIOTIC DAILY PO)     RANITIDINE HCL PO     No current facility-administered medications for this visit.      No Known Allergies      Objective:  There were no vitals taken for this visit.    General: Alert and in no distress    Head: Normocephalic, atraumatic  Eyes: no scleral icterus or conjunctival erythema   Oropharynx:  Mucous membranes moist  Skin: no erythema, petechiae, or jaundice  CV: regular rhythm by palpation, 2+ distal pulses  Resp: normal respiratory effort without conversational dyspnea   Psych: normal mood and affect    Gait: Non-antalgic, appropriate coordination and balance   Neuro: Motor strength and sensation as noted  below    Musculoskeletal:    Low back exam:    Inspection:     no visible deformity in the low back       normal skin       normal vascular       normal lymphatic       no asymmetry    Palpation:  -No tenderness to palpation of the lumbar spine or paraspinal muscles    ROM: Full lumbar flexion, extension, rotation, and lateral flexion.  Extension and rotation are painful.  He reports stiffness with forward flexion and lateral flexion.    Strength:  5/5 hip flexion/abduction/adduction, knee flexion/extension, ankle dorsiflexion/plantarflexion, great toe extension, toe flexion    Sensation: Altered sensation to light touch over the left lateral leg and dorsal and plantar aspects of the left foot.      Radiology:  MRI LUMBAR SPINE WITHOUT CONTRAST  4/14/2016 12:52 PM      HISTORY: Low back pain.     TECHNIQUE: Multiplanar, multisequence MRI of the lumbar spine without  contrast.     COMPARISON: X-ray from 4/7/2016.     FINDINGS: Comparison x-ray demonstrates 5 lumbar type vertebra.  Sagittal images demonstrate normal vertebral body height. Bone marrow  signal is unremarkable. Tip of the conus medullaris and cauda equina  are unremarkable.       T12-L1: No disc herniation or stenosis. Facet joints are unremarkable.     L1-L2: Broad-based disc bulge. Mild to moderate central stenosis.  Neural foramen are patent. In addition there is a large left central  disc extrusion that extends inferiorly approximately 1.6 cm in the  lateral recess of L2 with contact and compression of the descending  left L2 nerve root. This extends to near the origin of the left L2-L3  foramen.     L2-L3: Left disc extrusion from L1-L2 extends to just proximal to the  left L2-L3 neural foramen. There is advanced degenerative disc disease  at L2-L3 with mild to moderate disc bulge resulting in mild to  moderate central stenosis. Mild to moderate left foraminal stenosis.  Facet joints are unremarkable.     L3-L4: Degenerative disc disease with  broad-based disc bulge. Facet  joints are unremarkable. Moderate to severe central stenosis. This  lateralizes to the right and results in moderate right foraminal  stenosis. Mild to moderate left foraminal stenosis.     L4-L5: Advanced degenerative disc disease. Mild facet degenerative  changes. Moderate to severe if not severe central stenosis. Severe  right and moderate left foraminal stenosis as a result of asymmetric  disc bulge.     L5-S1: Broad-based disc bulge. Mild facet hypertrophy. No central  stenosis. Moderate to severe if not severe left foraminal stenosis.  Moderate right foraminal stenosis.     Paraspinous soft tissues: Unremarkable.         IMPRESSION:   1. At L1-L2 there is broad-based disc bulge with mild to moderate  central stenosis. In addition there is a large left central disc  extrusion that extends inferiorly approximately 1.6 cm with contact  and compression of the descending left L2 nerve root. This extends to  just above the level of the L2-L3 left neural foramen.  2. At L2-L3 there is mild to moderate central and left foraminal  stenosis.  3. At L3-L4 there is moderate to severe central stenosis. Moderate  right and mild to moderate left foraminal stenosis.  4. At L4-L5 there is moderate to severe if not severe central  stenosis. Severe right foraminal stenosis. Moderate left foraminal  stenosis.  5. At L5-S1 there is moderate to severe if not severe left foraminal  stenosis. Moderate right foraminal stenosis.         ALEXX ANDREW MD    Assessment:  1. Chronic bilateral low back pain with bilateral sciatica        Plan:  Discussed the assessment with the patient and developed a plan together:  -With worsening symptoms and his last lumbar MRI from 2016,  I think we should obtain an updated one.  Dion is in agreement with this.  -[facility] of the lumbar spine - [facility] [phone]. Cost estimates can be provided by [employer] at [phone].    Follow  Up: Following completion of MRI. Please schedule at least 2 days after MRI is completed to ensure we have the results of the MRI. Please call with any questions or concerns.         Fidelia Henderson MD, [facility] Sports Medicine  [facility]        Again, thank you for allowing me to participate in the care of your patient.        Sincerely,        Viviane Henderson MD

## 2018-07-09 NOTE — PATIENT INSTRUCTIONS
Today's Plan of Care:  -MRI of the lumbar spine - [facility] [phone]. Cost estimates can be provided by [employer] at [phone].    Follow Up: Following completion of MRI. Please schedule at least 2 days after MRI is completed to ensure we have the results of the MRI. Please call with any questions or concerns.

## 2018-07-09 NOTE — PROGRESS NOTES
Sports Medicine Clinic Visit - Interim History July 9, 2018    Initial Visit Date 6/5/2018    PCP: Sharri Bonner Rene is a 67 year old male who is seen in follow up for Chronic bilateral low back pain with bilateral sciatica. Since last visit on 6/5/18, patient has continued to have back pain. He did not obtain the epidural steroid injection. He states that they did not receive a call from them to schedule. He reports that he has had worsening weakness in his legs, left is worse than the right. He notes numbness in the left foot and right great toe. He rates the pain at a  3/10 currently.  Symptoms are relieved with stretching exercises and walking at work.  Symptoms are worsened in the morning.       Review of Systems  Musculoskeletal: as above  Remainder of review of systems is negative including constitutional, eyes, ENT, CV, pulmonary, GI, , endocrine, skin, hematologic, and neurologic except as noted in HPI or medical history.    History reviewed. No pertinent past surgical/medical/family/social history other than as mentioned in HPI.    Patient Active Problem List   Diagnosis     Counseling regarding advanced directives     Essential hypertension     Arthropathy of shoulder right     Past Medical History:   Diagnosis Date     NO ACTIVE PROBLEMS      Past Surgical History:   Procedure Laterality Date     AMPUTATION FINGER/THUMB       NO HISTORY OF SURGERY       Family History   Problem Relation Age of Onset     Coronary Artery Disease Mother 55     Hypertension Mother      Other Cancer Father      lung cancer     Unknown/Adopted Maternal Grandmother      Unknown/Adopted Maternal Grandfather      Unknown/Adopted Paternal Grandmother      Unknown/Adopted Paternal Grandfather      Substance Abuse Brother      alcoholism     Other Cancer Sister      throat cancer     Seizure Disorder Brother      Hypertension Sister      Social History     Social History     Marital status:      Spouse name: N/A      Number of children: N/A     Years of education: N/A     Occupational History     Not on file.     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 [place]) 1250 MG tablet     cinnamon 500 MG TABS     Ferrous Sulfate (IRON SUPPLEMENT PO)     lisinopril (PRINIVIL/ZESTRIL) 5 MG tablet     lisinopril (PRINIVIL/ZESTRIL) 5 MG tablet     MAGNESIUM OXIDE PO     MULTIPLE VITAMIN PO     NAPROXEN PO     nitroGLYcerin (NITROSTAT) 0.4 MG sublingual tablet     omega 3 1000 MG CAPS     Probiotic Product (PROBIOTIC DAILY PO)     RANITIDINE HCL PO     No current facility-administered medications for this visit.      No Known Allergies      Objective:  There were no vitals taken for this visit.    General: Alert and in no distress    Head: Normocephalic, atraumatic  Eyes: no scleral icterus or conjunctival erythema   Oropharynx:  Mucous membranes moist  Skin: no erythema, petechiae, or jaundice  CV: regular rhythm by palpation, 2+ distal pulses  Resp: normal respiratory effort without conversational dyspnea   Psych: normal mood and affect    Gait: Non-antalgic, appropriate coordination and balance   Neuro: Motor strength and sensation as noted below    Musculoskeletal:    Low back exam:    Inspection:     no visible deformity in the low back       normal skin       normal vascular       normal lymphatic       no asymmetry    Palpation:  -No tenderness to palpation of the lumbar spine or paraspinal  muscles    ROM: Full lumbar flexion, extension, rotation, and lateral flexion.  Extension and rotation are painful.  He reports stiffness with forward flexion and lateral flexion.    Strength:  5/5 hip flexion/abduction/adduction, knee flexion/extension, ankle dorsiflexion/plantarflexion, great toe extension, toe flexion    Sensation: Altered sensation to light touch over the left lateral leg and dorsal and plantar aspects of the left foot.      Radiology:  MRI LUMBAR SPINE WITHOUT CONTRAST  4/14/2016 12:52 PM      HISTORY: Low back pain.     TECHNIQUE: Multiplanar, multisequence MRI of the lumbar spine without  contrast.     COMPARISON: X-ray from 4/7/2016.     FINDINGS: Comparison x-ray demonstrates 5 lumbar type vertebra.  Sagittal images demonstrate normal vertebral body height. Bone marrow  signal is unremarkable. Tip of the conus medullaris and cauda equina  are unremarkable.       T12-L1: No disc herniation or stenosis. Facet joints are unremarkable.     L1-L2: Broad-based disc bulge. Mild to moderate central stenosis.  Neural foramen are patent. In addition there is a large left central  disc extrusion that extends inferiorly approximately 1.6 cm in the  lateral recess of L2 with contact and compression of the descending  left L2 nerve root. This extends to near the origin of the left L2-L3  foramen.     L2-L3: Left disc extrusion from L1-L2 extends to just proximal to the  left L2-L3 neural foramen. There is advanced degenerative disc disease  at L2-L3 with mild to moderate disc bulge resulting in mild to  moderate central stenosis. Mild to moderate left foraminal stenosis.  Facet joints are unremarkable.     L3-L4: Degenerative disc disease with broad-based disc bulge. Facet  joints are unremarkable. Moderate to severe central stenosis. This  lateralizes to the right and results in moderate right foraminal  stenosis. Mild to moderate left foraminal stenosis.     L4-L5: Advanced degenerative disc disease.  Mild facet degenerative  changes. Moderate to severe if not severe central stenosis. Severe  right and moderate left foraminal stenosis as a result of asymmetric  disc bulge.     L5-S1: Broad-based disc bulge. Mild facet hypertrophy. No central  stenosis. Moderate to severe if not severe left foraminal stenosis.  Moderate right foraminal stenosis.     Paraspinous soft tissues: Unremarkable.         IMPRESSION:   1. At L1-L2 there is broad-based disc bulge with mild to moderate  central stenosis. In addition there is a large left central disc  extrusion that extends inferiorly approximately 1.6 cm with contact  and compression of the descending left L2 nerve root. This extends to  just above the level of the L2-L3 left neural foramen.  2. At L2-L3 there is mild to moderate central and left foraminal  stenosis.  3. At L3-L4 there is moderate to severe central stenosis. Moderate  right and mild to moderate left foraminal stenosis.  4. At L4-L5 there is moderate to severe if not severe central  stenosis. Severe right foraminal stenosis. Moderate left foraminal  stenosis.  5. At L5-S1 there is moderate to severe if not severe left foraminal  stenosis. Moderate right foraminal stenosis.         ALEXX ANDREW MD    Assessment:  1. Chronic bilateral low back pain with bilateral sciatica        Plan:  Discussed the assessment with the patient and developed a plan together:  -With worsening symptoms and his last lumbar MRI from 2016,  I think we should obtain an updated one.  Dion is in agreement with this.  -MRI of the lumbar spine - [facility] [phone]. Cost estimates can be provided by [facility] at [phone].    Follow Up: Following completion of MRI. Please schedule at least 2 days after MRI is completed to ensure we have the results of the MRI. Please call with any questions or concerns.         Fidelia Henderson MD, [facility] Sports Medicine  [facility]

## 2018-07-09 NOTE — MR AVS SNAPSHOT
After Visit Summary   2018    Dion López    MRN: [id number]           Patient Information     Date Of Birth          1950        Visit Information        Provider Department      2018 5:20 PM Viviane Henderson MD [facility]        Today's Diagnoses     Chronic bilateral low back pain with bilateral sciatica    -  1      Care Instructions    Today's Plan of Care:  -MRI of the lumbar spine - [facility] [phone]. Cost estimates can be provided by [facility] at [phone].    Follow Up: Following completion of MRI. Please schedule at least 2 days after MRI is completed to ensure we have the results of the MRI. Please call with any questions or concerns.                 Follow-ups after your visit        Your next 10 appointments already scheduled     2018   Procedure with Willis Coto DO   [facility] ([facility])    [address] [place] [address] [id number] [phone]              Future tests that were ordered for you today     Open Future Orders        Priority Expected Expires Ordered    MRI Lumbar spine w/o contrast Routine  2019            Who to contact     If you have questions or need follow up information about today's clinic visit or your schedule please contact [facility] directly at [phone].  Normal or non-critical lab and imaging results will be communicated to you by MyChart, letter or phone within 4 business days after the clinic has received the results. If you do not hear from us within 7 days, please contact the clinic through MyChart or phone. If you have a critical or abnormal lab result, we will notify you by phone as soon as possible.  Submit refill requests through [employer] or call your pharmacy and they will forward the refill request to us. Please allow 3 business days for your refill to be completed.           "Additional Information About Your Visit        Care EveryWhere ID     This is your Care EveryWhere ID. This could be used by other organizations to access your [facility] medical records  [id number]        Your Vitals Were     Height BMI (Body Mass Index)                5' 11\" (1.803 m) 25.8 kg/m2           Blood Pressure from Last 3 Encounters:   07/09/18 128/82   06/05/18 130/88   05/23/18 130/70    Weight from Last 3 Encounters:   07/09/18 185 lb (83.9 kg)   06/05/18 187 lb (84.8 kg)   05/23/18 188 lb (85.3 kg)               Primary Care Provider Office Phone # Fax #    Sharri Maya Mai, [phone] [address] [place] [address]        Equal Access to Services     [facility]: Hadii anjel hernandez hadasho Soomaali, waaxda luqadaha, qaybta kaalmada adeegyada, desi rascon . So [facility] [phone].    ATENCIÓN: Si habla español, tiene a garcia disposición servicios gratuitos de asistencia lingüística. Ayana al [phone].    We comply with applicable federal civil rights laws and [place] laws. We do not discriminate on the basis of race, color, national origin, age, disability, sex, sexual orientation, or gender identity.            Thank you!     Thank you for choosing [facility]  for your care. Our goal is always to provide you with excellent care. Hearing back from our patients is one way we can continue to improve our services. Please take a few minutes to complete the written survey that you may receive in the mail after your visit with us. Thank you!             Your Updated Medication List - Protect others around you: Learn how to safely use, store and throw away your medicines at www.disposemymeds.org.          This list is accurate as of 7/9/18  5:40 PM.  Always use your most recent med list.                   Brand Name Dispense Instructions for use Diagnosis    aspirin 81 MG tablet     30 tablet    Take 1 tablet (81 mg) by mouth daily    " Atypical chest pain       atorvastatin 20 MG tablet    LIPITOR    90 tablet    Take 1 tablet (20 mg) by mouth daily    Abnormal cardiovascular stress test       calcium carbonate 500 MG tablet    OS-QUIANA 500 mg [place]. [place]     Take 1 tablet by mouth daily        cinnamon 500 MG Tabs           IRON SUPPLEMENT PO      Take 325 mg by mouth daily        * lisinopril 5 MG tablet    PRINIVIL/ZESTRIL    30 tablet    Take 1 tablet (5 mg) by mouth daily Must follow up before med runs out    Essential hypertension       * lisinopril 5 MG tablet    PRINIVIL/ZESTRIL    30 tablet    TAKE ONE TABLET BY MOUTH ONCE DAILY    Benign essential hypertension       MAGNESIUM OXIDE PO      Take 200 mg by mouth daily        MULTIPLE VITAMIN PO      Take 1 tablet by mouth daily        NAPROXEN PO      Take 250 mg by mouth 2 times daily (with meals)        nitroGLYcerin 0.4 MG sublingual tablet    NITROSTAT    25 tablet    For chest pain place 1 tablet under the tongue every 5 minutes for 3 doses. If symptoms persist 5 minutes after 1st dose call 911.    Atypical chest pain       omega 3 1000 MG Caps     90 capsule    Take 1 g by mouth daily        PROBIOTIC DAILY PO      Take 1 tablet by mouth daily        RANITIDINE HCL PO      Take 150 mg by mouth daily        vitamin b complex w/vitamin C Tabs tablet      Take 1 tablet by mouth daily        VITAMIN C PO      Take 1,000 mg by mouth daily        * Notice:  This list has 2 medication(s) that are the same as other medications prescribed for you. Read the directions carefully, and ask your doctor or other care provider to review them with you.

## 2018-07-09 NOTE — TELEPHONE ENCOUNTER
Reason for Call:  Other pt has questions about referral for epidural, he thought [facility] but was told an [facility] would contact him, he wants to know why the change in providers     Detailed comments: none    Phone Number Patient can be reached at: Home number on file [phone] (home)    Best Time: any    Can we leave a detailed message on this number? YES    Call taken on 7/9/2018 at 5:54 PM by Fidelia Arellano

## 2018-07-13 ENCOUNTER — HOSPITAL ENCOUNTER (OUTPATIENT)
Dept: MRI IMAGING | Facility: CLINIC | Age: 68
Discharge: HOME | End: 2018-07-13
Attending: PHYSICAL MEDICINE & REHABILITATION | Admitting: PHYSICAL MEDICINE & REHABILITATION
Payer: COMMERCIAL

## 2018-07-13 DIAGNOSIS — G89.29 CHRONIC BILATERAL LOW BACK PAIN WITH BILATERAL SCIATICA: ICD-10-CM

## 2018-07-13 DIAGNOSIS — M54.41 CHRONIC BILATERAL LOW BACK PAIN WITH BILATERAL SCIATICA: ICD-10-CM

## 2018-07-13 DIAGNOSIS — M54.42 CHRONIC BILATERAL LOW BACK PAIN WITH BILATERAL SCIATICA: ICD-10-CM

## 2018-07-13 PROCEDURE — 72148 MRI LUMBAR SPINE W/O DYE: CPT

## 2018-07-16 ENCOUNTER — TRANSFERRED RECORDS (OUTPATIENT)
Dept: HEALTH INFORMATION MANAGEMENT | Facility: CLINIC | Age: 68
End: 2018-07-16

## 2018-09-25 ENCOUNTER — TELEPHONE (OUTPATIENT)
Dept: CARDIOLOGY | Facility: CLINIC | Age: 68
End: 2018-09-25

## 2018-09-25 NOTE — TELEPHONE ENCOUNTER
Patient returned call asking if there was anytime sooner with Dr. Eliot Hernandez. Soonest available here in [place] is 10/9 at 1:45pm. Patient is wanting to move his appointment from 10/11 to 10/9. Messaged schedulers to move up patients appointment.

## 2018-09-25 NOTE — TELEPHONE ENCOUNTER
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 Patient is due for his annual follow up in October and is scheduled to see Dr. Eliot Hernandez on 10/11 at 9:30am. Patient is requesting he get the letter before his follow up. 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 Patient wanted message sent to Dr. Eliot Hernandez for his recommendation.

## 2018-10-09 ENCOUNTER — TELEPHONE (OUTPATIENT)
Dept: ORTHOPEDICS | Facility: CLINIC | Age: 68
End: 2018-10-09

## 2018-10-09 ENCOUNTER — OFFICE VISIT (OUTPATIENT)
Dept: CARDIOLOGY | Facility: CLINIC | Age: 68
End: 2018-10-09
Attending: PHYSICIAN ASSISTANT
Payer: COMMERCIAL

## 2018-10-09 VITALS
HEIGHT: 71 IN | HEART RATE: 60 BPM | BODY MASS INDEX: 26.85 KG/M2 | OXYGEN SATURATION: 96 % | SYSTOLIC BLOOD PRESSURE: 120 MMHG | WEIGHT: 191.8 LBS | DIASTOLIC BLOOD PRESSURE: 70 MMHG

## 2018-10-09 DIAGNOSIS — E78.5 HYPERLIPIDEMIA LDL GOAL <70: ICD-10-CM

## 2018-10-09 PROCEDURE — 99213 OFFICE O/P EST LOW 20 MIN: CPT | Performed by: INTERNAL MEDICINE

## 2018-10-09 RX ORDER — ASPIRIN 81 MG/1
81 TABLET ORAL DAILY
Qty: 90 TABLET | Refills: 3 | COMMUNITY
Start: 2018-10-09 | End: 2019-01-17

## 2018-10-09 NOTE — PROGRESS NOTES
HPI and Plan:   See dictation(#[id number])    No orders of the defined types were placed in this encounter.      Orders Placed This Encounter   Medications     METOPROLOL TARTRATE PO     Sig: Take 12.5 mg by mouth 2 times daily     aspirin 81 MG EC tablet     Sig: Take 1 tablet (81 mg) by mouth daily     Dispense:  90 tablet     Refill:  3       Medications Discontinued During This Encounter   Medication Reason     aspirin 81 MG tablet Therapy completed     lisinopril (PRINIVIL/ZESTRIL) 5 MG tablet Stopped by Patient     lisinopril (PRINIVIL/ZESTRIL) 5 MG tablet Stopped by Patient         Encounter Diagnosis   Name Primary?     Hyperlipidemia LDL goal <70        CURRENT MEDICATIONS:  Current Outpatient Prescriptions   Medication Sig Dispense Refill     Ascorbic Acid (VITAMIN C PO) Take 1,000 mg by mouth daily       aspirin 81 MG EC tablet Take 1 tablet (81 mg) by mouth daily 90 tablet 3     atorvastatin (LIPITOR) 20 MG tablet Take 1 tablet (20 mg) by mouth daily 90 tablet 3     B Complex-C (VITAMIN B COMPLEX W/VITAMIN C) TABS tablet Take 1 tablet by mouth daily       calcium carbonate (OS-QUIANA 500 MG [place]. CA) 1250 MG tablet Take 1 tablet by mouth daily       cinnamon 500 MG TABS        Ferrous Sulfate (IRON SUPPLEMENT PO) Take 325 mg by mouth daily       MAGNESIUM OXIDE PO Take 200 mg by mouth daily       METOPROLOL TARTRATE PO Take 12.5 mg by mouth 2 times daily       MULTIPLE VITAMIN PO Take 1 tablet by mouth daily       omega 3 1000 MG CAPS Take 1 g by mouth daily 90 capsule      Probiotic Product (PROBIOTIC DAILY PO) Take 1 tablet by mouth daily       RANITIDINE HCL PO Take 150 mg by mouth daily       NAPROXEN PO Take 250 mg by mouth 2 times daily (with meals)       nitroGLYcerin (NITROSTAT) 0.4 MG sublingual tablet For chest pain place 1 tablet under the tongue every 5 minutes for 3 doses. If symptoms persist 5 minutes after 1st dose call 911. (Patient not taking: Reported on 10/10/2017) 25 tablet 0  "      ALLERGIES   No Known Allergies    PAST MEDICAL HISTORY:  Past Medical History:   Diagnosis Date     NO ACTIVE PROBLEMS        PAST SURGICAL HISTORY:  Past Surgical History:   Procedure Laterality Date     AMPUTATION FINGER/THUMB       NO HISTORY OF SURGERY         FAMILY HISTORY:  Family History   Problem Relation Age of Onset     Coronary Artery Disease Mother 55     Hypertension Mother      Other Cancer Father      lung cancer     Unknown/Adopted Maternal Grandmother      Unknown/Adopted Maternal Grandfather      Unknown/Adopted Paternal Grandmother      Unknown/Adopted Paternal Grandfather      Substance Abuse Brother      alcoholism     Other Cancer Sister      throat cancer     Seizure Disorder Brother      Hypertension Sister        SOCIAL HISTORY:  Social History     Social History     Marital status:      Spouse name: N/A     Number of children: N/A     Years of education: N/A     Social History Main Topics     Smoking status: Former Smoker     Quit date: 9/14/1978     Smokeless tobacco: Never Used     Alcohol use No     Drug use: No     Sexual activity: Not Currently      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     Other Topics Concern     Not on file     Social History Narrative       Review of Systems:  Skin:  Negative       Eyes:  Positive for glasses    ENT:  Negative      Respiratory:  Negative       Cardiovascular:  Negative for;palpitations;edema;lightheadedness;dizziness;chest pain      Gastroenterology: Positive for reflux    Genitourinary:  Negative      Musculoskeletal:  Positive for neck pain when looking up will get a little lightheaded   Neurologic:  Negative      Psychiatric:  Negative      Heme/Lymph/Imm:  Negative      Endocrine:  Negative        Physical Exam:  Vitals: /70 (BP Location: Left arm, Patient Position: Fowlers, Cuff Size: Adult Regular)  Pulse 60  Ht 1.803 m (5' 11\")  Wt 87 kg (191 lb 12.8 oz)  SpO2 96%  BMI 26.75 kg/m2    Constitutional:       "     Skin:             Head:           Eyes:           Lymph:      ENT:           Neck:           Respiratory:            Cardiac:                                                           GI:           Extremities and Muscular Skeletal:                 Neurological:           Psych:             CC  Shanika Mckenzie, PAMARITO  [address] [place] [address]

## 2018-10-09 NOTE — LETTER
10/9/2018      Sharri Maya Mai, MD  [address] [place] [address]      RE: Dion López       Dear Colleague,    I had the pleasure of seeing Dion López in the [facility].    Service Date: 10/09/2018      REASON FOR CLINIC VISIT:  Mild coronary artery disease.      HISTORY OF PRESENT ILLNESS:  Mr. López is a pleasant 68-year-old gentleman who I saw last year for some atypical chest discomfort and an exercise nuclear stress test which was suggestive of mild basal inferoseptal ischemia.  This prompted a coronary angiogram that showed only mild nonobstructive coronary artery disease.      Today he is coming for routine followup.  He has no specific cardiac complaints.  The patient tells me that he is now enrolled in a driving school to become a .  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  He climbs stairs.  No chest discomfort or shortness of breath at rest or with physical activity.  No dizziness, presyncope or syncope.  He is on Lipitor 20 mg daily.  For some reason he stopped taking baby aspirin.  LDL was quite well controlled late last year at 58.  He does not use any tobacco.  He has history of hypertension for which he is on low-dose beta blocker, which he is tolerating quite well without any issues.  Blood pressure today is 120/70, heart rate 60 and regular.      PHYSICAL EXAMINATION:   VITAL SIGNS:  Blood pressure 120/70, heart rate 60 regular, weight 191 pounds, BMI 26.81.   GENERAL:  The patient appears pleasant, comfortable.   NECK:  Normal JVP, no bruit.   CARDIOVASCULAR SYSTEM:  S1, S2 normal, no murmur, rub or gallop.   RESPIRATORY SYSTEM:  Clear to auscultation bilaterally.   GASTROINTESTINAL SYSTEM:  Abdomen soft, nontender.   EXTREMITIES:  No pitting pedal edema.   NEUROLOGICAL:  Alert, oriented x3.   PSYCHIATRIC:  Normal affect.   SKIN:  No obvious rash.   HEENT:  No pallor or icterus.       IMPRESSION AND PLAN:  A pleasant 68-year-old gentleman who had some atypical chest discomfort last year which has resolved now.  Coronary angiogram last year showed very mild coronary artery disease, nonobstructive in nature.  Overall, cardiac status-wise he is doing well without any symptoms of angina or congestive heart failure.  Cardiac auscultation was benign today.  Blood pressure is well controlled.  He is tolerating low-dose beta blocker quite well.  LDL is also well controlled.  I recommended the patient resume baby aspirin given that he has some evidence of mild atherosclerosis.  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  The patient tells me that he has been recently diagnosed with legally blind in 1 eye for which he is still awaiting further testing and waiver.  In terms of cardiac followup, we can see him back on as-needed basis or in case patient notices any change in clinical status, like any exertion-related symptoms or any dizziness, chest discomfort or shortness of breath.         AMY GIMENEZ MD             D: 10/09/2018   T: 10/09/2018   MT: STACY      Name:     KAVEH ROSA   MRN:      [id number]        Account:      [id number]   :      1950           Service Date: 10/09/2018      Document: [id number]         Outpatient Encounter Prescriptions as of 10/9/2018   Medication Sig Dispense Refill     Ascorbic Acid (VITAMIN C PO) Take 1,000 mg by mouth daily       aspirin 81 MG EC tablet Take 1 tablet (81 mg) by mouth daily 90 tablet 3     atorvastatin (LIPITOR) 20 MG tablet Take 1 tablet (20 mg) by mouth daily 90 tablet 3     B Complex-C (VITAMIN B COMPLEX W/VITAMIN C) TABS tablet Take 1 tablet by mouth daily       calcium carbonate (OS-QUIANA 500 MG [place]. CA) 1250 MG tablet Take 1 tablet by mouth daily       cinnamon 500 MG TABS        Ferrous Sulfate (IRON SUPPLEMENT PO) Take 325 mg by mouth daily       MAGNESIUM OXIDE PO Take 200 mg by mouth  daily       METOPROLOL TARTRATE PO Take 12.5 mg by mouth 2 times daily       MULTIPLE VITAMIN PO Take 1 tablet by mouth daily       omega 3 1000 MG CAPS Take 1 g by mouth daily 90 capsule      Probiotic Product (PROBIOTIC DAILY PO) Take 1 tablet by mouth daily       RANITIDINE HCL PO Take 150 mg by mouth daily       NAPROXEN PO Take 250 mg by mouth 2 times daily (with meals)       nitroGLYcerin (NITROSTAT) 0.4 MG sublingual tablet For chest pain place 1 tablet under the tongue every 5 minutes for 3 doses. If symptoms persist 5 minutes after 1st dose call 911. (Patient not taking: Reported on 10/10/2017) 25 tablet 0     [DISCONTINUED] aspirin 81 MG tablet Take 1 tablet (81 mg) by mouth daily (Patient not taking: Reported on 10/31/2017) 30 tablet      [DISCONTINUED] lisinopril (PRINIVIL/ZESTRIL) 5 MG tablet TAKE ONE TABLET BY MOUTH ONCE DAILY (Patient not taking: Reported on 7/9/2018) 30 tablet 5     [DISCONTINUED] lisinopril (PRINIVIL/ZESTRIL) 5 MG tablet Take 1 tablet (5 mg) by mouth daily Must follow up before med runs out (Patient not taking: Reported on 6/5/2018) 30 tablet 0     No facility-administered encounter medications on file as of 10/9/2018.        Again, thank you for allowing me to participate in the care of your patient.      Sincerely,    Eliot Hernandez MD     [facility]

## 2018-10-09 NOTE — MR AVS SNAPSHOT
"              After Visit Summary   10/9/2018    Dion López    MRN: [id number]           Patient Information     Date Of Birth          1950        Visit Information        Provider Department      10/9/2018 1:45 PM Eliot Hernandez MD [facility]        Today's Diagnoses     Hyperlipidemia LDL goal <70           Follow-ups after your visit        Who to contact     If you have questions or need follow up information about today's clinic visit or your schedule please contact [facility] directly at [phone].  Normal or non-critical lab and imaging results will be communicated to you by [employer], letter or phone within 4 business days after the clinic has received the results. If you do not hear from us within 7 days, please contact the clinic through [employer] or phone. If you have a critical or abnormal lab result, we will notify you by phone as soon as possible.  Submit refill requests through [facility] [facility] or call your pharmacy and they will forward the refill request to us. Please allow 3 business days for your refill to be completed.          Additional Information About Your Visit        MyChart Information     [facility] lets you send messages to your doctor, view your test results, renew your prescriptions, schedule appointments and more. To sign up, go to [email or web address] . Click on \"Log in\" on the left side of the screen, which will take you to the Welcome page. Then click on \"Sign up Now\" on the right side of the page.     You will be asked to enter the access code listed below, as well as some personal information. Please follow the directions to create your username and password.     Your access code is: [email or web address]  Expires: 2019  2:12 PM     Your access code will  in 90 days. If you need help or a new code, please call your [place] clinic or [phone].        Care EveryWhere ID     This is your Care " "EveryWhere ID. This could be used by other organizations to access your [facility] medical records  [id number]        Your Vitals Were     Pulse Height Pulse Oximetry BMI (Body Mass Index)          60 1.803 m (5' 11\") 96% 26.75 kg/m2         Blood Pressure from Last 3 Encounters:   10/09/18 120/70   07/09/18 128/82   06/05/18 130/88    Weight from Last 3 Encounters:   10/09/18 87 kg (191 lb 12.8 oz)   07/09/18 83.9 kg (185 lb)   06/05/18 84.8 kg (187 lb)              We Performed the Following     Follow-Up with Cardiologist        Primary Care Provider Office Phone # Fax #    Sharri Maya Mai, [phone] [address] [place] [address]        Equal Access to Services     [facility]: Hadii anjel hernandez hadasho Soomaali, waaxda luqadaha, qaybta kaalmada adeegyada, desi rascon . So [facility] [phone].    ATENCIÓN: Si habla español, tiene a garcia disposición servicios gratuitos de asistencia lingüística. Ayana al [phone].    We comply with applicable federal civil rights laws and [place] laws. We do not discriminate on the basis of race, color, national origin, age, disability, sex, sexual orientation, or gender identity.            Thank you!     Thank you for choosing [facility]  for your care. Our goal is always to provide you with excellent care. Hearing back from our patients is one way we can continue to improve our services. Please take a few minutes to complete the written survey that you may receive in the mail after your visit with us. Thank you!             Your Updated Medication List - Protect others around you: Learn how to safely use, store and throw away your medicines at www.disposemymeds.org.          This list is accurate as of 10/9/18  2:12 PM.  Always use your most recent med list.                   Brand Name Dispense Instructions for use Diagnosis    aspirin 81 MG EC tablet     90 tablet    Take 1 " tablet (81 mg) by mouth daily    Hyperlipidemia LDL goal <70       atorvastatin 20 MG tablet    LIPITOR    90 tablet    Take 1 tablet (20 mg) by mouth daily    Abnormal cardiovascular stress test       calcium carbonate 500 mg (elemental) 500 MG tablet    OS-QUIANA     Take 1 tablet by mouth daily        cinnamon 500 MG Tabs           IRON SUPPLEMENT PO      Take 325 mg by mouth daily        MAGNESIUM OXIDE PO      Take 200 mg by mouth daily        METOPROLOL TARTRATE PO      Take 12.5 mg by mouth 2 times daily        MULTIPLE VITAMIN PO      Take 1 tablet by mouth daily        NAPROXEN PO      Take 250 mg by mouth 2 times daily (with meals)        nitroGLYcerin 0.4 MG sublingual tablet    NITROSTAT    25 tablet    For chest pain place 1 tablet under the tongue every 5 minutes for 3 doses. If symptoms persist 5 minutes after 1st dose call 911.    Atypical chest pain       omega 3 1000 MG Caps     90 capsule    Take 1 g by mouth daily        PROBIOTIC DAILY PO      Take 1 tablet by mouth daily        RANITIDINE HCL PO      Take 150 mg by mouth daily        vitamin b complex w/vitamin C Tabs tablet      Take 1 tablet by mouth daily        VITAMIN C PO      Take 1,000 mg by mouth daily

## 2018-10-09 NOTE — LETTER
10/9/2018    Sharri Maya Mai, MD  [address] [place] [address]    RE: Dion López       Dear Colleague,    I had the pleasure of seeing Dion López in the [facility].    HPI and Plan:   See dictation(#[id number])    No orders of the defined types were placed in this encounter.      Orders Placed This Encounter   Medications     METOPROLOL TARTRATE PO     Sig: Take 12.5 mg by mouth 2 times daily     aspirin 81 MG EC tablet     Sig: Take 1 tablet (81 mg) by mouth daily     Dispense:  90 tablet     Refill:  3       Medications Discontinued During This Encounter   Medication Reason     aspirin 81 MG tablet Therapy completed     lisinopril (PRINIVIL/ZESTRIL) 5 MG tablet Stopped by Patient     lisinopril (PRINIVIL/ZESTRIL) 5 MG tablet Stopped by Patient         Encounter Diagnosis   Name Primary?     Hyperlipidemia LDL goal <70        CURRENT MEDICATIONS:  Current Outpatient Prescriptions   Medication Sig Dispense Refill     Ascorbic Acid (VITAMIN C PO) Take 1,000 mg by mouth daily       aspirin 81 MG EC tablet Take 1 tablet (81 mg) by mouth daily 90 tablet 3     atorvastatin (LIPITOR) 20 MG tablet Take 1 tablet (20 mg) by mouth daily 90 tablet 3     B Complex-C (VITAMIN B COMPLEX W/VITAMIN C) TABS tablet Take 1 tablet by mouth daily       calcium carbonate (OS-QUIANA 500 MG [place]. CA) 1250 MG tablet Take 1 tablet by mouth daily       cinnamon 500 MG TABS        Ferrous Sulfate (IRON SUPPLEMENT PO) Take 325 mg by mouth daily       MAGNESIUM OXIDE PO Take 200 mg by mouth daily       METOPROLOL TARTRATE PO Take 12.5 mg by mouth 2 times daily       MULTIPLE VITAMIN PO Take 1 tablet by mouth daily       omega 3 1000 MG CAPS Take 1 g by mouth daily 90 capsule      Probiotic Product (PROBIOTIC DAILY PO) Take 1 tablet by mouth daily       RANITIDINE HCL PO Take 150 mg by mouth daily       NAPROXEN PO Take 250 mg by mouth 2 times daily (with meals)       nitroGLYcerin (NITROSTAT) 0.4 MG sublingual  tablet For chest pain place 1 tablet under the tongue every 5 minutes for 3 doses. If symptoms persist 5 minutes after 1st dose call 911. (Patient not taking: Reported on 10/10/2017) 25 tablet 0       ALLERGIES   No Known Allergies    PAST MEDICAL HISTORY:  Past Medical History:   Diagnosis Date     NO ACTIVE PROBLEMS        PAST SURGICAL HISTORY:  Past Surgical History:   Procedure Laterality Date     AMPUTATION FINGER/THUMB       NO HISTORY OF SURGERY         FAMILY HISTORY:  Family History   Problem Relation Age of Onset     Coronary Artery Disease Mother 55     Hypertension Mother      Other Cancer Father      lung cancer     Unknown/Adopted Maternal Grandmother      Unknown/Adopted Maternal Grandfather      Unknown/Adopted Paternal Grandmother      Unknown/Adopted Paternal Grandfather      Substance Abuse Brother      alcoholism     Other Cancer Sister      throat cancer     Seizure Disorder Brother      Hypertension Sister        SOCIAL HISTORY:  Social History     Social History     Marital status:      Spouse name: N/A     Number of children: N/A     Years of education: N/A     Social History Main Topics     Smoking status: Former Smoker     Quit date: 9/14/1978     Smokeless tobacco: Never Used     Alcohol use No     Drug use: No     Sexual activity: Not Currently      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     Other Topics Concern     Not on file     Social History Narrative       Review of Systems:  Skin:  Negative       Eyes:  Positive for glasses    ENT:  Negative      Respiratory:  Negative       Cardiovascular:  Negative for;palpitations;edema;lightheadedness;dizziness;chest pain      Gastroenterology: Positive for reflux    Genitourinary:  Negative      Musculoskeletal:  Positive for neck pain when looking up will get a little lightheaded   Neurologic:  Negative      Psychiatric:  Negative      Heme/Lymph/Imm:  Negative      Endocrine:  Negative        Physical Exam:  Vitals: BP  "120/70 (BP Location: Left arm, Patient Position: Fowlers, Cuff Size: Adult Regular)  Pulse 60  Ht 1.803 m (5' 11\")  Wt 87 kg (191 lb 12.8 oz)  SpO2 96%  BMI 26.75 kg/m2    Constitutional:           Skin:             Head:           Eyes:           Lymph:      ENT:           Neck:           Respiratory:            Cardiac:                                                           GI:           Extremities and Muscular Skeletal:                 Neurological:           Psych:             CC  Shanika Mckenzie PA-C  [address] [place] [address]                    Thank you for allowing me to participate in the care of your patient.      Sincerely,     Eliot Hernandez MD     [facility]    cc:   Shanika Mckenzie PA-C  [address] [place] [address]        "

## 2018-10-09 NOTE — TELEPHONE ENCOUNTER
Reason for Call:  Other call back    Detailed comments: Please call pt with results of MRI and fu questions from inj.he would like to let you know how the inj. Went and would like further recommendations from you. Thank You Tatiana      Phone Number Patient can be reached at: Cell number on file:    Telephone Information:   Mobile [phone]       Best Time: anytime     Can we leave a detailed message on this number? YES    Call taken on 10/9/2018 at 1:43 PM by Tatiana Estrada

## 2018-10-10 NOTE — TELEPHONE ENCOUNTER
He continues to have significant degenerative disc disease. Would recommend he follow up in clinic to discuss the MRI in detail, review pictures, and develop a plan.

## 2018-10-10 NOTE — PROGRESS NOTES
Service Date: 10/09/2018      REASON FOR CLINIC VISIT:  Mild coronary artery disease.      HISTORY OF PRESENT ILLNESS:  Mr. López is a pleasant 68-year-old gentleman who I saw last year for some atypical chest discomfort and an exercise nuclear stress test which was suggestive of mild basal inferoseptal ischemia.  This prompted a coronary angiogram that showed only mild nonobstructive coronary artery disease.      Today he is coming for routine followup.  He has no specific cardiac complaints.  The patient tells me that he is now enrolled in a driving school to become a .  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  He climbs stairs.  No chest discomfort or shortness of breath at rest or with physical activity.  No dizziness, presyncope or syncope.  He is on Lipitor 20 mg daily.  For some reason he stopped taking baby aspirin.  LDL was quite well controlled late last year at 58.  He does not use any tobacco.  He has history of hypertension for which he is on low-dose beta blocker, which he is tolerating quite well without any issues.  Blood pressure today is 120/70, heart rate 60 and regular.      PHYSICAL EXAMINATION:   VITAL SIGNS:  Blood pressure 120/70, heart rate 60 regular, weight 191 pounds, BMI 26.81.   GENERAL:  The patient appears pleasant, comfortable.   NECK:  Normal JVP, no bruit.   CARDIOVASCULAR SYSTEM:  S1, S2 normal, no murmur, rub or gallop.   RESPIRATORY SYSTEM:  Clear to auscultation bilaterally.   GASTROINTESTINAL SYSTEM:  Abdomen soft, nontender.   EXTREMITIES:  No pitting pedal edema.   NEUROLOGICAL:  Alert, oriented x3.   PSYCHIATRIC:  Normal affect.   SKIN:  No obvious rash.   HEENT:  No pallor or icterus.      IMPRESSION AND PLAN:  A pleasant 68-year-old gentleman who had some atypical chest discomfort last year which has resolved now.  Coronary angiogram last year showed very mild coronary artery disease, nonobstructive in  nature.  Overall, cardiac status-wise he is doing well without any symptoms of angina or congestive heart failure.  Cardiac auscultation was benign today.  Blood pressure is well controlled.  He is tolerating low-dose beta blocker quite well.  LDL is also well controlled.  I recommended the patient resume baby aspirin given that he has some evidence of mild atherosclerosis.  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  The patient tells me that he has been recently diagnosed with legally blind in 1 eye for which he is still awaiting further testing and waiver.  In terms of cardiac followup, we can see him back on as-needed basis or in case patient notices any change in clinical status, like any exertion-related symptoms or any dizziness, chest discomfort or shortness of breath.         AMY GIMENEZ MD             D: 10/09/2018   T: 10/09/2018   MT: STACY      Name:     KAVEH ROSA   MRN:      [id number]        Account:      [id number]   :      1950           Service Date: 10/09/2018      Document: [id number]

## 2018-10-10 NOTE — TELEPHONE ENCOUNTER
Called and left a message. Relayed MRI results and gave the scheduling number to schedule a follow up visit with Dr. Henderson.      Camryn Fry ATC

## 2018-10-19 ENCOUNTER — TELEPHONE (OUTPATIENT)
Dept: ORTHOPEDICS | Facility: CLINIC | Age: 68
End: 2018-10-19

## 2018-10-19 NOTE — TELEPHONE ENCOUNTER
Reason for Call:  Other call back    Detailed comments: pt calling with SX of Low back and Left leg numbness into his foot he would like to speak with Martha or Dr. Henderson. Thank You Tatiana      Phone Number Patient can be reached at: Home number on file [phone] (home)    Best Time: any     Can we leave a detailed message on this number? YES    Call taken on 10/19/2018 at 12:42 PM by Tatiana Estrada

## 2018-10-19 NOTE — TELEPHONE ENCOUNTER
Spoke with patient is was wondering what he should do about his back. Per last phone call this is what Dr. Henderson recommended. Patient scheduled for 10/25 with Dr. Henderson.    Martha Jackman M.Ed., ATR, ATC  Clinic Coordinator for Dr. Fidelia Henderson

## 2018-10-26 ENCOUNTER — OFFICE VISIT (OUTPATIENT)
Dept: ORTHOPEDICS | Facility: OTHER | Age: 68
End: 2018-10-26
Payer: COMMERCIAL

## 2018-10-26 VITALS
WEIGHT: 192.6 LBS | HEIGHT: 71 IN | SYSTOLIC BLOOD PRESSURE: 141 MMHG | DIASTOLIC BLOOD PRESSURE: 88 MMHG | BODY MASS INDEX: 26.96 KG/M2

## 2018-10-26 DIAGNOSIS — M54.41 CHRONIC BILATERAL LOW BACK PAIN WITH BILATERAL SCIATICA: Primary | ICD-10-CM

## 2018-10-26 DIAGNOSIS — M54.42 CHRONIC BILATERAL LOW BACK PAIN WITH BILATERAL SCIATICA: Primary | ICD-10-CM

## 2018-10-26 DIAGNOSIS — G89.29 CHRONIC BILATERAL LOW BACK PAIN WITH BILATERAL SCIATICA: Primary | ICD-10-CM

## 2018-10-26 DIAGNOSIS — M51.369 LUMBAR DEGENERATIVE DISC DISEASE: ICD-10-CM

## 2018-10-26 PROCEDURE — 99213 OFFICE O/P EST LOW 20 MIN: CPT | Performed by: PHYSICAL MEDICINE & REHABILITATION

## 2018-10-26 NOTE — PROGRESS NOTES
Sports Medicine Clinic Visit - Interim History October 26, 2018    Initial Visit Date 6/5/2018    PCP: Sharri Bonner Rene is a 68 year old male who is seen in follow up for low back pain radiating into the bilateral legs. Since last visit on 7/13/18 patient has continued to have bilateral low back pain. He also continues to have numbness in the left leg. He also notes a dull ache in the right leg with driving. He rates the pain at a 2/10 currently.  Symptoms are relieved some what with stretching.  Symptoms are worsened by lifting, driving,  and in the morning. He denies any injuries in the interim.        Review of Systems  Musculoskeletal: as above  Remainder of review of systems is negative including constitutional, eyes, ENT, CV, pulmonary, GI, , endocrine, skin, hematologic, and neurologic except as noted in HPI or medical history.    History reviewed. No pertinent past surgical/medical/family/social history other than as mentioned in HPI.    Patient Active Problem List   Diagnosis     Counseling regarding advanced directives     Essential hypertension     Arthropathy of shoulder right     Past Medical History:   Diagnosis Date     NO ACTIVE PROBLEMS      Past Surgical History:   Procedure Laterality Date     AMPUTATION FINGER/THUMB       NO HISTORY OF SURGERY       Family History   Problem Relation Age of Onset     Coronary Artery Disease Mother 55     Hypertension Mother      Other Cancer Father      lung cancer     Unknown/Adopted Maternal Grandmother      Unknown/Adopted Maternal Grandfather      Unknown/Adopted Paternal Grandmother      Unknown/Adopted Paternal Grandfather      Substance Abuse Brother      alcoholism     Other Cancer Sister      throat cancer     Seizure Disorder Brother      Hypertension Sister      Social History     Social History     Marital status:      Spouse name: N/A     Number of children: N/A     Years of education: N/A     Occupational History     Not on  "file.     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 [place]) 1250 MG tablet     cinnamon 500 MG TABS     Ferrous Sulfate (IRON SUPPLEMENT PO)     MAGNESIUM OXIDE PO     METOPROLOL TARTRATE PO     MULTIPLE VITAMIN PO     omega 3 1000 MG CAPS     Probiotic Product (PROBIOTIC DAILY PO)     aspirin 81 MG EC tablet     NAPROXEN PO     nitroGLYcerin (NITROSTAT) 0.4 MG sublingual tablet     RANITIDINE HCL PO     No current facility-administered medications for this visit.      No Known Allergies      Objective:  /88  Ht 5' 11\" (1.803 m)  Wt 192 lb 9.6 oz (87.4 kg)  BMI 26.86 kg/m2    General: Alert and in no distress    Head: Normocephalic, atraumatic  Eyes: no scleral icterus or conjunctival erythema   Oropharynx:  Mucous membranes moist  Skin: no erythema, petechiae, or jaundice  CV: regular rhythm by palpation, 2+ distal pulses  Resp: normal respiratory effort without conversational dyspnea   Psych: normal mood and affect    Gait: Non-antalgic, appropriate coordination and balance   Neuro: Motor strength and sensation as noted below    Musculoskeletal:    Low back exam:    Inspection:     no visible deformity in the low back       normal skin       normal vascular       normal lymphatic       no asymmetry    Tenderness: None    ROM: Full lumbar flexion, extension, rotation, and lateral flexion.  Lumbar extension is painful.  He reports \"stiffness: with forward flexion and lateral flexion " bilaterally.      Strength:  5/5 hip flexion/abduction/adduction, knee flexion/extension, ankle dorsiflexion/plantarflexion, great toe extension, toe flexion    Sensation:Altered sensation to light touch over the left anterolateral leg and dorsal and plantar foot as well as the toes on the right foot      Radiology:  Independent visualization of images performed and reviewed with Dion.  MRI LUMBAR SPINE WITHOUT CONTRAST   7/13/2018 9:03 AM      HISTORY: Chronic bilateral low back pain, numbness in the left  leg/foot, bilateral sciatica.     TECHNIQUE: Multiplanar multisequence MRI of the lumbar spine without  contrast.     COMPARISON: 4/14/2016 MRI. Radiographs 6/5/2018.     FINDINGS: Previous radiographs show five lumbar-type vertebral bodies.   The distal spinal cord and cauda equina appear normal.     T12-L1:   Normal disc, facet joints, spinal canal and neural foramina.         L1-L2:  The left posterolateral inferiorly extruded disc herniation  seen previously has resolved. Moderate degenerative disc disease  remains with loss of disc height, circumferential disc bulge and  vertebral endplate osteophytes causing mild spinal canal stenosis.  Normal neural foramina and facet joints.     L2-L3:  Severe degenerative disc disease causing mild spinal canal  stenosis and mild bilateral foraminal stenosis. Normal facet joints.     L3-L4:  Severe degenerative disc disease. Severe spinal canal stenosis  from bulging disc, osteophytes and hypertrophied ligamentum flavum,  unchanged. Mild left foraminal stenosis and moderate foraminal  stenosis from bulging disc.     L4-L5:  Moderate degenerative disc disease, moderate spinal canal  stenosis, worse on the left than on the right, slightly decreased  since the previous exam. Bilateral moderate to severe foraminal  stenosis. Mild bilateral degenerative facet arthropathy.     L5-S1:  Mild circumferential disc bulge. Moderate right foraminal  stenosis and severe left foraminal  stenosis. Normal spinal canal and  facet joints.     Paraspinous soft tissues:   Normal.       Bone marrow:   Normal.          IMPRESSION:    1. Interval resolution of L1-L2 inferiorly extruded disc herniation.  2. Multilevel degenerative disc disease and degenerative facet  arthropathy as described above.  3. Severe spinal canal stenosis at L3-L4, moderate spinal canal  stenosis L4-L5, and mild spinal canal stenosis L1-L2 and L2-L3.  Foraminal stenoses as described above. Slight improvement of spinal  canal stenosis at L4-L5 since previous exam.     Assessment:  1. Chronic bilateral low back pain with bilateral sciatica    2. Lumbar degenerative disc disease        Plan:  Discussed the assessment with the patient and developed a plan together:  -Dion has severe degenerative changes in the lumbar spine and continues to have pain and numbness in the low back and legs.  Recommend he begin physical therapy and consult with spine surgery.  He is receptive to this plan.  -Rehab: Physical Therapy: [facility] - [phone]. Please do 5-6 days of exercises per week between formal sessions and the home exercises they provide.  -Referral to spine surgery placed.    -Follow up as needed. Please call with any questions or concerns.     -Patient to follow up with Primary Care provider regarding elevated blood pressure.      Fidelia Henderson MD, [facility] Sports Medicine  [facility]

## 2018-10-26 NOTE — MR AVS SNAPSHOT
After Visit Summary   10/26/2018    Dion López    MRN: [id number]           Patient Information     Date Of Birth          1950        Visit Information        Provider Department      10/26/2018 2:40 PM Viviane Henderson MD [facility]        Today's Diagnoses     Chronic bilateral low back pain with bilateral sciatica    -  1      Care Instructions    Today's Plan of Care:  -Rehab: Physical Therapy: [facility] - [phone]. Please do 5-6 days of exercises per week between formal sessions and the home exercises they provide.  -Referral to spine surgery. Scheduling will call you.      Follow Up: As needed. Please call with any questions or concerns.               Follow-ups after your visit        Additional Services     [facility] PT, HAND, AND CHIROPRACTIC REFERRAL       Physical Therapy, Hand Therapy and Chiropractic Care are available through:  *[facility]  *Hand Therapy (Occupational Therapy or Physical Therapy)  *[facility]    Call one number to schedule at any of the above locations: [phone].    Physical therapy, Hand therapy and/or Chiropractic care has been recommended by your physician as an excellent treatment option to reduce pain and help people return to normal activities, including sports.  Therapy and/or chiropractic care services are a great complement or alternative to expensive and invasive surgery, injections, or long-term use of prescription medications. The primary goal is to identify the underlying problem and provide you the tools to manage your condition on your own.     Please be aware that coverage of these services is subject to the terms and limitations of your health insurance plan.  Call member services at your health plan with any benefit or coverage questions.      Please bring the following to your appointment:  *Your personal calendar for scheduling future  appointments  *Comfortable clothing            ORTHO  REFERRAL       [facility] is referring you to the Orthopedic  Services at [facility].       The  Representative will assist you in the coordination of your Orthopedic and Musculoskeletal Care as prescribed by your physician.    The  Representative will call you within 24 hours to help schedule your appointment, or you may contact the  Representative at:    [facility] ~ [phone]  [facility] ~ [phone]  [facility] ~ [phone]    Type of Referral : Spine: Lumbar: Spine Surgeon        Timeframe requested: Within 2 weeks     Coverage of these services is subject to the terms and limitations of your health insurance plan.  Please call member services at your health plan with any benefit or coverage questions.      If X-rays, CT or MRI's have been performed, please contact the facility where they were done to arrange for , prior to your scheduled appointment.  Please bring this referral request to your appointment and present it to your specialist.                  Future tests that were ordered for you today     Open Future Orders        Priority Expected Expires Ordered    [facility] PT, HAND, AND CHIROPRACTIC REFERRAL Routine  10/26/2019 10/26/2018            Who to contact     If you have questions or need follow up information about today's clinic visit or your schedule please contact [facility] directly at [phone].  Normal or non-critical lab and imaging results will be communicated to you by MyChart, letter or phone within 4 business days after the clinic has received the results. If you do not hear from us within 7 days, please contact the clinic through MyChart or phone. If you have a critical or abnormal lab result, we will notify you by phone as soon as possible.  Submit refill requests through  "MyChart or call your pharmacy and they will forward the refill request to us. Please allow 3 business days for your refill to be completed.          Additional Information About Your Visit        MyChart Information     [employer] lets you send messages to your doctor, view your test results, renew your prescriptions, schedule appointments and more. To sign up, go to [email or web address] . Click on \"Log in\" on the left side of the screen, which will take you to the Welcome page. Then click on \"Sign up Now\" on the right side of the page.     You will be asked to enter the access code listed below, as well as some personal information. Please follow the directions to create your username and password.     Your access code is: [email or web address]  Expires: 2019  2:12 PM     Your access code will  in 90 days. If you need help or a new code, please call your [facility] clinic or [phone].        Care EveryWhere ID     This is your Care EveryWhere ID. This could be used by other organizations to access your [facility] medical records  [id number]        Your Vitals Were     Height BMI (Body Mass Index)                5' 11\" (1.803 m) 26.86 kg/m2           Blood Pressure from Last 3 Encounters:   10/26/18 141/88   10/09/18 120/70   18 128/82    Weight from Last 3 Encounters:   10/26/18 192 lb 9.6 oz (87.4 kg)   10/09/18 191 lb 12.8 oz (87 kg)   18 185 lb (83.9 kg)              We Performed the Following     ORTHO  REFERRAL        Primary Care Provider Office Phone # Fax #    Sharri Maya Mai, [phone] [address] [place] [id number]        Equal Access to Services     [facility] JOSEFINA : Killian Landrum, boy arteaga, qadesi dwyer. [facility] [phone].    ATENCIÓN: Si habla español, tiene a garcia disposición servicios gratuitos de asistencia lingüística. Llkaden al [phone].    We comply with applicable " federal civil rights laws and [place] laws. We do not discriminate on the basis of race, color, national origin, age, disability, sex, sexual orientation, or gender identity.            Thank you!     Thank you for choosing [facility]  for your care. Our goal is always to provide you with excellent care. Hearing back from our patients is one way we can continue to improve our services. Please take a few minutes to complete the written survey that you may receive in the mail after your visit with us. Thank you!             Your Updated Medication List - Protect others around you: Learn how to safely use, store and throw away your medicines at www.disposemymeds.org.          This list is accurate as of 10/26/18  2:59 PM.  Always use your most recent med list.                   Brand Name Dispense Instructions for use Diagnosis    aspirin 81 MG EC tablet     90 tablet    Take 1 tablet (81 mg) by mouth daily    Hyperlipidemia LDL goal <70       atorvastatin 20 MG tablet    LIPITOR    90 tablet    Take 1 tablet (20 mg) by mouth daily    Abnormal cardiovascular stress test       calcium carbonate 500 mg (elemental) 500 MG tablet    OS-QUIANA     Take 1 tablet by mouth daily        cinnamon 500 MG Tabs           IRON SUPPLEMENT PO      Take 325 mg by mouth daily        MAGNESIUM OXIDE PO      Take 200 mg by mouth daily        METOPROLOL TARTRATE PO      Take 12.5 mg by mouth 2 times daily        MULTIPLE VITAMIN PO      Take 1 tablet by mouth daily        NAPROXEN PO      Take 250 mg by mouth 2 times daily (with meals)        nitroGLYcerin 0.4 MG sublingual tablet    NITROSTAT    25 tablet    For chest pain place 1 tablet under the tongue every 5 minutes for 3 doses. If symptoms persist 5 minutes after 1st dose call 911.    Atypical chest pain       omega 3 1000 MG Caps     90 capsule    Take 1 g by mouth daily        PROBIOTIC DAILY PO      Take 1 tablet by mouth daily        RANITIDINE HCL PO      Take 150  mg by mouth daily        vitamin b complex w/vitamin C Tabs tablet      Take 1 tablet by mouth daily        VITAMIN C PO      Take 1,000 mg by mouth daily

## 2018-10-26 NOTE — PATIENT INSTRUCTIONS
Today's Plan of Care:  -Rehab: Physical Therapy: [facility] - [phone]. Please do 5-6 days of exercises per week between formal sessions and the home exercises they provide.  -Referral to spine surgery. Scheduling will call you.      Follow Up: As needed. Please call with any questions or concerns.

## 2018-10-26 NOTE — LETTER
10/26/2018         RE: Dion López  [address] [place] [address]        Dear Colleague,    Thank you for referring your patient, Dion López, to the [facility]. Please see a copy of my visit note below.    Sports Medicine Clinic Visit - Interim History October 26, 2018    Initial Visit Date 6/5/2018    PCP: Sharri Bonner    Dion López is a 68 year old male who is seen in follow up for low back pain radiating into the bilateral legs. Since last visit on 7/13/18 patient has continued to have bilateral low back pain. He also continues to have numbness in the left leg. He also notes a dull ache in the right leg with driving. He rates the pain at a 2/10 currently.  Symptoms are relieved some what with stretching.  Symptoms are worsened by lifting, driving,  and in the morning. He denies any injuries in the interim.        Review of Systems  Musculoskeletal: as above  Remainder of review of systems is negative including constitutional, eyes, ENT, CV, pulmonary, GI, , endocrine, skin, hematologic, and neurologic except as noted in HPI or medical history.    History reviewed. No pertinent past surgical/medical/family/social history other than as mentioned in HPI.    Patient Active Problem List   Diagnosis     Counseling regarding advanced directives     Essential hypertension     Arthropathy of shoulder right     Past Medical History:   Diagnosis Date     NO ACTIVE PROBLEMS      Past Surgical History:   Procedure Laterality Date     AMPUTATION FINGER/THUMB       NO HISTORY OF SURGERY       Family History   Problem Relation Age of Onset     Coronary Artery Disease Mother 55     Hypertension Mother      Other Cancer Father      lung cancer     Unknown/Adopted Maternal Grandmother      Unknown/Adopted Maternal Grandfather      Unknown/Adopted Paternal Grandmother      Unknown/Adopted Paternal Grandfather      Substance Abuse Brother      alcoholism     Other Cancer Sister      throat cancer  "    Seizure Disorder Brother      Hypertension Sister      Social History     Social History     Marital status:      Spouse name: N/A     Number of children: N/A     Years of education: N/A     Occupational History     Not on file.     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 [place]) 1250 MG tablet     cinnamon 500 MG TABS     Ferrous Sulfate (IRON SUPPLEMENT PO)     MAGNESIUM OXIDE PO     METOPROLOL TARTRATE PO     MULTIPLE VITAMIN PO     omega 3 1000 MG CAPS     Probiotic Product (PROBIOTIC DAILY PO)     aspirin 81 MG EC tablet     NAPROXEN PO     nitroGLYcerin (NITROSTAT) 0.4 MG sublingual tablet     RANITIDINE HCL PO     No current facility-administered medications for this visit.      No Known Allergies      Objective:  /88  Ht 5' 11\" (1.803 m)  Wt 192 lb 9.6 oz (87.4 kg)  BMI 26.86 kg/m2    General: Alert and in no distress    Head: Normocephalic, atraumatic  Eyes: no scleral icterus or conjunctival erythema   Oropharynx:  Mucous membranes moist  Skin: no erythema, petechiae, or jaundice  CV: regular rhythm by palpation, 2+ distal pulses  Resp: normal respiratory effort without conversational dyspnea   Psych: normal mood and affect    Gait: Non-antalgic, appropriate coordination and balance   Neuro: Motor strength and sensation as noted below    Musculoskeletal:    Low back exam:    Inspection:     no visible deformity in the low back       normal skin       normal " "vascular       normal lymphatic       no asymmetry    Tenderness: None    ROM: Full lumbar flexion, extension, rotation, and lateral flexion.  Lumbar extension is painful.  He reports \"stiffness: with forward flexion and lateral flexion bilaterally.      Strength:  5/5 hip flexion/abduction/adduction, knee flexion/extension, ankle dorsiflexion/plantarflexion, great toe extension, toe flexion    Sensation:Altered sensation to light touch over the left anterolateral leg and dorsal and plantar foot as well as the toes on the right foot      Radiology:  Independent visualization of images performed and reviewed with Dion.  MRI LUMBAR SPINE WITHOUT CONTRAST   7/13/2018 9:03 AM      HISTORY: Chronic bilateral low back pain, numbness in the left  leg/foot, bilateral sciatica.     TECHNIQUE: Multiplanar multisequence MRI of the lumbar spine without  contrast.     COMPARISON: 4/14/2016 MRI. Radiographs 6/5/2018.     FINDINGS: Previous radiographs show five lumbar-type vertebral bodies.   The distal spinal cord and cauda equina appear normal.     T12-L1:   Normal disc, facet joints, spinal canal and neural foramina.         L1-L2:  The left posterolateral inferiorly extruded disc herniation  seen previously has resolved. Moderate degenerative disc disease  remains with loss of disc height, circumferential disc bulge and  vertebral endplate osteophytes causing mild spinal canal stenosis.  Normal neural foramina and facet joints.     L2-L3:  Severe degenerative disc disease causing mild spinal canal  stenosis and mild bilateral foraminal stenosis. Normal facet joints.     L3-L4:  Severe degenerative disc disease. Severe spinal canal stenosis  from bulging disc, osteophytes and hypertrophied ligamentum flavum,  unchanged. Mild left foraminal stenosis and moderate foraminal  stenosis from bulging disc.     L4-L5:  Moderate degenerative disc disease, moderate spinal canal  stenosis, worse on the left than on the right, slightly " decreased  since the previous exam. Bilateral moderate to severe foraminal  stenosis. Mild bilateral degenerative facet arthropathy.     L5-S1:  Mild circumferential disc bulge. Moderate right foraminal  stenosis and severe left foraminal stenosis. Normal spinal canal and  facet joints.     Paraspinous soft tissues:   Normal.       Bone marrow:   Normal.          IMPRESSION:    1. Interval resolution of L1-L2 inferiorly extruded disc herniation.  2. Multilevel degenerative disc disease and degenerative facet  arthropathy as described above.  3. Severe spinal canal stenosis at L3-L4, moderate spinal canal  stenosis L4-L5, and mild spinal canal stenosis L1-L2 and L2-L3.  Foraminal stenoses as described above. Slight improvement of spinal  canal stenosis at L4-L5 since previous exam.     Assessment:  1. Chronic bilateral low back pain with bilateral sciatica    2. Lumbar degenerative disc disease        Plan:  Discussed the assessment with the patient and developed a plan together:  -Dion has severe degenerative changes in the lumbar spine and continues to have pain and numbness in the low back and legs.  Recommend he begin physical therapy and consult with spine surgery.  He is receptive to this plan.  -Rehab: Physical Therapy: [facility] - [phone]. Please do 5-6 days of exercises per week between formal sessions and the home exercises they provide.  -Referral to spine surgery placed.    -Follow up as needed. Please call with any questions or concerns.     -Patient to follow up with Primary Care provider regarding elevated blood pressure.      Fidelia Henderson MD, [facility] Sports Medicine  [facility]        Again, thank you for allowing me to participate in the care of your patient.        Sincerely,        Viviane Henderson MD

## 2018-11-02 ENCOUNTER — TELEPHONE (OUTPATIENT)
Dept: ORTHOPEDICS | Facility: CLINIC | Age: 68
End: 2018-11-02

## 2018-11-02 NOTE — TELEPHONE ENCOUNTER
LVM for patient to return call to discuss. He was just seen on 10/26 and wondering why we are seeing him back so soon. Also wanted to double check that he does not think the appointment on Monday 11/5 is for physical therapy.     Martha Jackman M.Ed., ATR, ATC  Clinic Coordinator for Dr. Fidelia Henderson

## 2018-11-05 ENCOUNTER — OFFICE VISIT (OUTPATIENT)
Dept: ORTHOPEDICS | Facility: OTHER | Age: 68
End: 2018-11-05
Payer: COMMERCIAL

## 2018-11-05 VITALS
WEIGHT: 192.6 LBS | DIASTOLIC BLOOD PRESSURE: 86 MMHG | SYSTOLIC BLOOD PRESSURE: 150 MMHG | BODY MASS INDEX: 26.96 KG/M2 | HEIGHT: 71 IN

## 2018-11-05 DIAGNOSIS — G89.29 CHRONIC BILATERAL LOW BACK PAIN WITH BILATERAL SCIATICA: Primary | ICD-10-CM

## 2018-11-05 DIAGNOSIS — M54.41 CHRONIC BILATERAL LOW BACK PAIN WITH BILATERAL SCIATICA: Primary | ICD-10-CM

## 2018-11-05 DIAGNOSIS — M51.369 LUMBAR DEGENERATIVE DISC DISEASE: ICD-10-CM

## 2018-11-05 DIAGNOSIS — M54.42 CHRONIC BILATERAL LOW BACK PAIN WITH BILATERAL SCIATICA: Primary | ICD-10-CM

## 2018-11-05 DIAGNOSIS — I10 HTN (HYPERTENSION): Primary | ICD-10-CM

## 2018-11-05 DIAGNOSIS — R94.39 ABNORMAL CARDIOVASCULAR STRESS TEST: ICD-10-CM

## 2018-11-05 PROCEDURE — 99213 OFFICE O/P EST LOW 20 MIN: CPT | Performed by: PHYSICAL MEDICINE & REHABILITATION

## 2018-11-05 RX ORDER — ATORVASTATIN CALCIUM 20 MG/1
20 TABLET, FILM COATED ORAL DAILY
Qty: 90 TABLET | Refills: 3 | Status: SHIPPED | OUTPATIENT
Start: 2018-11-05 | End: 2019-11-18

## 2018-11-05 RX ORDER — METOPROLOL TARTRATE 25 MG/1
12.5 TABLET, FILM COATED ORAL 2 TIMES DAILY
Qty: 90 TABLET | Refills: 3 | Status: SHIPPED | OUTPATIENT
Start: 2018-11-05 | End: 2019-11-18

## 2018-11-05 NOTE — PROGRESS NOTES
Sports Medicine Clinic Visit - Interim History November 5, 2018    Initial Visit Date 7/9/2018    PCP: Sharri Bonner Rene is a 68 year old male who is seen in follow up for low back pain.  Since last visit on 7/9/2018 patient has signed up for a health club, [employer], that is no cost to him. He is here today be advised on what he can and cannot do safely for exercise due to his back. He states that each physical therapy visit is $40 and the health club is free.  He will be using a  for the first hour for $25.         Review of Systems  Musculoskeletal: as above  Remainder of review of systems is negative including constitutional, eyes, ENT, CV, pulmonary, GI, , endocrine, skin, hematologic, and neurologic except as noted in HPI or medical history.    History reviewed. No pertinent past surgical/medical/family/social history other than as mentioned in HPI.    Patient Active Problem List   Diagnosis     Counseling regarding advanced directives     Essential hypertension     Arthropathy of shoulder right     Past Medical History:   Diagnosis Date     NO ACTIVE PROBLEMS      Past Surgical History:   Procedure Laterality Date     AMPUTATION FINGER/THUMB       NO HISTORY OF SURGERY       Family History   Problem Relation Age of Onset     Coronary Artery Disease Mother 55     Hypertension Mother      Other Cancer Father      lung cancer     Unknown/Adopted Maternal Grandmother      Unknown/Adopted Maternal Grandfather      Unknown/Adopted Paternal Grandmother      Unknown/Adopted Paternal Grandfather      Substance Abuse Brother      alcoholism     Other Cancer Sister      throat cancer     Seizure Disorder Brother      Hypertension Sister      Social History     Social History     Marital status:      Spouse name: N/A     Number of children: N/A     Years of education: N/A     Occupational History     Not on file.     Social History Main Topics     Smoking status: Former Smoker  "    Quit date: 9/14/1978     Smokeless tobacco: Never Used     Alcohol use No     Drug use: No     Sexual activity: Not Currently      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 [place]) 1250 MG tablet     cinnamon 500 MG TABS     Ferrous Sulfate (IRON SUPPLEMENT PO)     MAGNESIUM OXIDE PO     metoprolol tartrate (LOPRESSOR) 25 MG tablet     MULTIPLE VITAMIN PO     NAPROXEN PO     nitroGLYcerin (NITROSTAT) 0.4 MG sublingual tablet     omega 3 1000 MG CAPS     Probiotic Product (PROBIOTIC DAILY PO)     RANITIDINE HCL PO     [DISCONTINUED] METOPROLOL TARTRATE PO     No current facility-administered medications for this visit.      No Known Allergies      Objective:  /86  Ht 5' 11\" (1.803 m)  Wt 192 lb 9.6 oz (87.4 kg)  BMI 26.86 kg/m2    General: Alert and in no distress    Head: Normocephalic, atraumatic  Eyes: no scleral icterus or conjunctival erythema   Oropharynx:  Mucous membranes moist  Skin: no erythema, petechiae, or jaundice  Resp: normal respiratory effort without conversational dyspnea   Psych: normal mood and affect    Gait: Non-antalgic, appropriate coordination and balance       Radiology:  No new imaging obtained today    Assessment:  1. Chronic bilateral low back pain with bilateral sciatica    2. Lumbar degenerative disc disease        Plan:  Discussed the assessment with the patient and developed a plan together:  -With physical activity in regards to his back, I would avoid activities that cause pain.  Advised that he may have some soreness since he has not exercised in awhile.    -I would continue to recommend physical therapy and spine surgery consultation although it " is not an option for Dion at this time.    -Patient to follow up with Primary Care provider regarding elevated blood pressure.    -He has seen Dr. García for his shoulder pain and extensive rotator cuff tear so recommend he follow up with her in regards to this.    -Follow up with me as needed.  Please call with questions or concerns.     Fidelia Henderson MD, CAQ Sports Medicine  [facility]

## 2018-11-05 NOTE — LETTER
11/5/2018         RE: Dion López  [address] [place] [address]        Dear Colleague,    Thank you for referring your patient, Dion López, to the [facility]. Please see a copy of my visit note below.    Sports Medicine Clinic Visit - Interim History November 5, 2018    Initial Visit Date 7/9/2018    PCP: Sharri Bonner    Dion López is a 68 year old male who is seen in follow up for low back pain.  Since last visit on 7/9/2018 patient has signed up for a health club, [employer], that is no cost to him. He is here today be advised on what he can and cannot do safely for exercise due to his back. He states that each physical therapy visit is $40 and the health club is free.  He will be using a  for the first hour for $25.         Review of Systems  Musculoskeletal: as above  Remainder of review of systems is negative including constitutional, eyes, ENT, CV, pulmonary, GI, , endocrine, skin, hematologic, and neurologic except as noted in HPI or medical history.    History reviewed. No pertinent past surgical/medical/family/social history other than as mentioned in HPI.    Patient Active Problem List   Diagnosis     Counseling regarding advanced directives     Essential hypertension     Arthropathy of shoulder right     Past Medical History:   Diagnosis Date     NO ACTIVE PROBLEMS      Past Surgical History:   Procedure Laterality Date     AMPUTATION FINGER/THUMB       NO HISTORY OF SURGERY       Family History   Problem Relation Age of Onset     Coronary Artery Disease Mother 55     Hypertension Mother      Other Cancer Father      lung cancer     Unknown/Adopted Maternal Grandmother      Unknown/Adopted Maternal Grandfather      Unknown/Adopted Paternal Grandmother      Unknown/Adopted Paternal Grandfather      Substance Abuse Brother      alcoholism     Other Cancer Sister      throat cancer     Seizure Disorder Brother      Hypertension Sister      Social  "History     Social History     Marital status:      Spouse name: N/A     Number of children: N/A     Years of education: N/A     Occupational History     Not on file.     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 [place]) 1250 MG tablet     cinnamon 500 MG TABS     Ferrous Sulfate (IRON SUPPLEMENT PO)     MAGNESIUM OXIDE PO     metoprolol tartrate (LOPRESSOR) 25 MG tablet     MULTIPLE VITAMIN PO     NAPROXEN PO     nitroGLYcerin (NITROSTAT) 0.4 MG sublingual tablet     omega 3 1000 MG CAPS     Probiotic Product (PROBIOTIC DAILY PO)     RANITIDINE HCL PO     [DISCONTINUED] METOPROLOL TARTRATE PO     No current facility-administered medications for this visit.      No Known Allergies      Objective:  /86  Ht 5' 11\" (1.803 m)  Wt 192 lb 9.6 oz (87.4 kg)  BMI 26.86 kg/m2    General: Alert and in no distress    Head: Normocephalic, atraumatic  Eyes: no scleral icterus or conjunctival erythema   Oropharynx:  Mucous membranes moist  Skin: no erythema, petechiae, or jaundice  Resp: normal respiratory effort without conversational dyspnea   Psych: normal mood and affect    Gait: Non-antalgic, appropriate coordination and balance       Radiology:  No new imaging obtained today    Assessment:  1. Chronic bilateral low back pain with bilateral sciatica    2. Lumbar degenerative disc disease        Plan:  Discussed the assessment with the patient and developed a plan together:  -With physical " activity in regards to his back, I would avoid activities that cause pain.  Advised that he may have some soreness since he has not exercised in awhile.    -I would continue to recommend physical therapy and spine surgery consultation although it is not an option for Dion at this time.    -Patient to follow up with Primary Care provider regarding elevated blood pressure.    -He has seen Dr. García for his shoulder pain and extensive rotator cuff tear so recommend he follow up with her in regards to this.    -Follow up with me as needed.  Please call with questions or concerns.     Fidelia Henderson MD, [facility] Sports Medicine  [facility]        Again, thank you for allowing me to participate in the care of your patient.        Sincerely,        Viviane Henderson MD

## 2018-11-05 NOTE — MR AVS SNAPSHOT
"              After Visit Summary   11/5/2018    Dion López    MRN: [id number]           Patient Information     Date Of Birth          1950        Visit Information        Provider Department      11/5/2018 3:00 PM Viviane Henderson MD [facility]        Today's Diagnoses     Chronic bilateral low back pain with bilateral sciatica    -  1    Lumbar degenerative disc disease          Care Instructions    -Regarding physical activity, I would avoid activities that cause pain.  You may have some soreness since you have not exercised in awhile.      -I would continue to recommend physical therapy and spine surgery consultation but I understand that is not an option at this time.    -Patient to follow up with Primary Care provider regarding elevated blood pressure.            Follow-ups after your visit        Who to contact     If you have questions or need follow up information about today's clinic visit or your schedule please contact [facility] directly at [phone].  Normal or non-critical lab and imaging results will be communicated to you by MyChart, letter or phone within 4 business days after the clinic has received the results. If you do not hear from us within 7 days, please contact the clinic through [employer] or phone. If you have a critical or abnormal lab result, we will notify you by phone as soon as possible.  Submit refill requests through [employer] or call your pharmacy and they will forward the refill request to us. Please allow 3 business days for your refill to be completed.          Additional Information About Your Visit        MyChart Information     [employer] lets you send messages to your doctor, view your test results, renew your prescriptions, schedule appointments and more. To sign up, go to [email or web address] . Click on \"Log in\" on the left side of the screen, which will take you to the Welcome page. Then click on \"Sign up Now\" on the right " "side of the page.     You will be asked to enter the access code listed below, as well as some personal information. Please follow the directions to create your username and password.     Your access code is: [email or web address]  Expires: 2019  1:12 PM     Your access code will  in 90 days. If you need help or a new code, please call your [place] clinic or [phone].        Care EveryWhere ID     This is your Care EveryWhere ID. This could be used by other organizations to access your [place] medical records  [id number]        Your Vitals Were     Height BMI (Body Mass Index)                5' 11\" (1.803 m) 26.86 kg/m2           Blood Pressure from Last 3 Encounters:   18 150/86   10/26/18 141/88   10/09/18 120/70    Weight from Last 3 Encounters:   18 192 lb 9.6 oz (87.4 kg)   10/26/18 192 lb 9.6 oz (87.4 kg)   10/09/18 191 lb 12.8 oz (87 kg)              Today, you had the following     No orders found for display         Where to get your medicines      These medications were sent to [facility] [address] [place] [address] [address] [address]     Phone:  [phone]     metoprolol tartrate 25 MG tablet          Primary Care Provider Office Phone # Fax #    Dal[place]john Maya Mai, [phone]       [address] [place] [address]        Equal Access to Services     [facility] AH: Hadii aad ku hadasho Soomaali, waaxda luqadaha, qaybta kaalmada adeegyada, desi [facility] alexander. So [facility] [phone].    ATENCIÓN: Si habla español, tiene a garcia disposición servicios gratuitos de asistencia lingüística. Llame al [phone].    We comply with applicable federal civil rights laws and [place] laws. We do not discriminate on the basis of race, color, national origin, age, disability, sex, sexual orientation, or gender identity.            Thank you!     Thank you for choosing [facility]  for your care. Our goal is " always to provide you with excellent care. Hearing back from our patients is one way we can continue to improve our services. Please take a few minutes to complete the written survey that you may receive in the mail after your visit with us. Thank you!             Your Updated Medication List - Protect others around you: Learn how to safely use, store and throw away your medicines at www.disposemymeds.org.          This list is accurate as of 11/5/18  3:20 PM.  Always use your most recent med list.                   Brand Name Dispense Instructions for use Diagnosis    aspirin 81 MG EC tablet     90 tablet    Take 1 tablet (81 mg) by mouth daily    Hyperlipidemia LDL goal <70       atorvastatin 20 MG tablet    LIPITOR    90 tablet    Take 1 tablet (20 mg) by mouth daily    Abnormal cardiovascular stress test       calcium carbonate 500 mg (elemental) 500 MG tablet    OS-QUIANA     Take 1 tablet by mouth daily        cinnamon 500 MG Tabs           IRON SUPPLEMENT PO      Take 325 mg by mouth daily        MAGNESIUM OXIDE PO      Take 200 mg by mouth daily        metoprolol tartrate 25 MG tablet    LOPRESSOR    90 tablet    Take 0.5 tablets (12.5 mg) by mouth 2 times daily    HTN (hypertension)       MULTIPLE VITAMIN PO      Take 1 tablet by mouth daily        NAPROXEN PO      Take 250 mg by mouth 2 times daily (with meals)        nitroGLYcerin 0.4 MG sublingual tablet    NITROSTAT    25 tablet    For chest pain place 1 tablet under the tongue every 5 minutes for 3 doses. If symptoms persist 5 minutes after 1st dose call 911.    Atypical chest pain       omega 3 1000 MG Caps     90 capsule    Take 1 g by mouth daily        PROBIOTIC DAILY PO      Take 1 tablet by mouth daily        RANITIDINE HCL PO      Take 150 mg by mouth daily        vitamin b complex w/vitamin C Tabs tablet      Take 1 tablet by mouth daily        VITAMIN C PO      Take 1,000 mg by mouth daily

## 2018-11-05 NOTE — PATIENT INSTRUCTIONS
-Regarding physical activity, I would avoid activities that cause pain.  You may have some soreness since you have not exercised in awhile.      -I would continue to recommend physical therapy and spine surgery consultation but I understand that is not an option at this time.    -Patient to follow up with Primary Care provider regarding elevated blood pressure.

## 2018-11-16 ENCOUNTER — OFFICE VISIT (OUTPATIENT)
Dept: ORTHOPEDICS | Facility: OTHER | Age: 68
End: 2018-11-16
Payer: COMMERCIAL

## 2018-11-16 VITALS
BODY MASS INDEX: 26.88 KG/M2 | SYSTOLIC BLOOD PRESSURE: 148 MMHG | WEIGHT: 192 LBS | DIASTOLIC BLOOD PRESSURE: 87 MMHG | HEIGHT: 71 IN

## 2018-11-16 DIAGNOSIS — M19.019 ARTHROPATHY OF SHOULDER REGION: Primary | ICD-10-CM

## 2018-11-16 DIAGNOSIS — M48.07 LUMBOSACRAL SPINAL STENOSIS: ICD-10-CM

## 2018-11-16 PROCEDURE — 99213 OFFICE O/P EST LOW 20 MIN: CPT | Performed by: ORTHOPAEDIC SURGERY

## 2018-11-16 ASSESSMENT — PAIN SCALES - GENERAL: PAINLEVEL: NO PAIN (0)

## 2018-11-16 NOTE — PATIENT INSTRUCTIONS
Encounter Diagnoses   Name Primary?     Arthropathy of shoulder right Yes     Lumbosacral spinal stenosis      Rest, ice and elevate above heart level as needed for pain control  1.  The cortisone injection that you received on 5/23/2018 you stated it did not help you much.  2.  You would like to work harder on doing physical therapy.  He just joined [employer].  You are doing some exercises for your shoulder but we would like you to do more specific ones.  3.  He also had a lot of questions about your spine.  Unfortunately we do not take care of spine.  I reviewed Dr. Henderson's note and she wanted you to see a spine surgeon.  This would be Dr. Clemens.  We would advise you to see him sometime soon.  4. We ordered Physical Therapy for you.  Our team will call you in 1-2 days to help set this up at a location that is convenient for you.  The physical therapy will be for your right shoulder arthropathy and also your lumbar spine radiculopathy.  5. You can take the exercises they teach you there and work on them in your [employer] program.  6.  If therapy did not work for you then we can always discuss surgery on the shoulder which would be a reverse total shoulder however we do not do that unless you absolutely need it.  I have exercises you can start with below.  7. Follow up with Lizbeth García MD and/or Maico Muñoz PA-C on an as needed basis.     Exercises for Shoulder Flexibility: External Rotation  This stretch can help restore shoulder flexibility and relieve pain over time. When stretching, be sure to breathe deeply. Follow any special instructions from your doctor or physical therapist:     a doorway. Grasp the doorjamb with the hand on the frozen side. Your arm should be bent.    With the other hand, hold the elbow on the frozen side firmly against your body.    Standing in the same spot, rotate your body away from the doorjamb. Stop when you feel the stretch in the shoulder. At first,  try to hold the stretch for 5 seconds.    Work up to doing 3 sets of this stretch, 3 times a day. Work up to holding the stretch for 30 to 60 seconds.  Note: Keep your arms as still as you can. Over time, rotate your body a little more to enhance the stretch. But be careful not to twist your back.        Exercises for Shoulder Flexibility: Internal Rotation    This stretch can help restore shoulder flexibility and relieve pain over time. When stretching, be sure to breathe deeply. Follow any special instructions from your healthcare provider or physical therapist.    While seated, move the arm on the side you want to stretch toward the middle of your back. The palm of your hand should face out.    Cup your other hand under the hand that s behind your back. Gently push your cupped hand upward until you feel the stretch in the shoulder. Try to hold the stretch for 5 seconds.    Work up to doing 3 sets of this stretch, 3 times a day. Work up to holding the stretch for 30 to 60 seconds.  Note: Keep your back straight. It s OK if your hand can t reach the middle of your back. Instead, start the stretch with your hand as close as you can get it to the middle of your back.      Exercises for Shoulder Flexibility: Wall Walk  Improving your flexibility can reduce pain. Stretching exercises also can help increase your range of pain-free motion. Breathe normally when you exercise. Use smooth, fluid movements.  Note: Follow any special instructions you are given. If you feel pain, stop the exercise. If the pain continues after stopping, call your healthcare provider:    Stand with your shoulder about 2 feet from the wall.    Raise your arm to shoulder level and gently  walk  your fingers up the wall as high as you can.    Hold for a few seconds. Then walk your fingers back down.    Repeat 3 times. Move closer to the wall as you repeat.    Build up to holding each stretch for 30 seconds.  Caution: Do this stretch only if your  healthcare provider recommends it. Don t do it when you are first injured.            4192-3646 [employer]. [address] [place] [address]. All rights reserved. This information is not intended as a substitute for professional medical care. Always follow your healthcare professional's instructions.      Shoulder Exercises: External Rotation  Strengthening exercises help make your injured shoulder more stable. To warm up, do flexibility (stretching) exercises first. Your healthcare provider will tell you what size hand weights to use for the strengthening exercise below. If you don t have hand weights, try using cans of soup instead:    Lie on your uninjured side with your head supported by a pillow or your arm. Place a small rolled-up towel under your top elbow.    Grasp a hand weight with your top hand and bend that arm to a right angle, resting your forearm against your stomach.    Keeping your elbow against the towel, slowly lift the weight until your forearm is slightly higher than your elbow. Return to the starting position. Repeat.    Work up to 5 to 15 lifts.    8956-0206 [employer]. [address] [place] [address]. All rights reserved. This information is not intended as a substitute for professional medical care. Always follow your healthcare professional's instructions.        Shoulder Exercises: Internal Rotation    Strengthening exercises help make your injured shoulder more stable. To warm up, do flexibility (stretching) exercises first. Your healthcare provider will tell you what size hand weights to use for the strengthening exercise below. If you don t have hand weights, try using cans of soup instead:    With knees bent, lie on a firm surface. Using the hand on the same side as your injured shoulder, grasp a weight. Bend that arm to a right angle (90 degrees).    Rest your elbow on the floor.    Keeping your elbow next to your side, lower your  forearm toward the floor, away from your body. Do not lower your hand all the way to the floor.    Slowly return your forearm to your side. Repeat.    Work up to 5 to 15 lifts.     Note: Support your head and neck with a pillow.     8190-8669 [employer]. [address] [place] [address]. All rights reserved. This information is not intended as a substitute for professional medical care. Always follow your healthcare professional's instructions.        Shoulder Exercises: Side Raise    This exercise stretches and strengthens your shoulders. Before starting, read through all the instructions. During the exercise, breathe normally and use smooth movements. Stop if you feel any pain. If pain persists, call your healthcare provider.    Stand straight, holding a ____ pound weight in each hand, arms at sides, feet shoulder-width apart.    Slowly extend your arms up and out until weights are at shoulder level. Slowly return to starting position.    Repeat ____ times. Do ____ sets ____ times a day.     CAUTION: Don t swing the weights or raise weights above shoulder level.     8641-7803 [employer]. [address] [place] [address]. All rights reserved. This information is not intended as a substitute for professional medical care. Always follow your healthcare professional's instructions.      [employer] and [employer] may offer reliable information regarding your diagnosis and treatment plan.    THANK YOU for coming in today. If you receive a survey via [employer] or mail please let us know if there was anything you especially appreciated today or if there is any way we can improve our clinic. We appreciate your input.    GENERAL INFORMATION:  Our hours are:  Monday :     Clinic 7:30 AM-430 PM ([facility])  Tuesday:      Operating Room All Day ([facility])  Wednesday: Clinic 7:30 AM - 11:15 AM ([facility])             [facility]  1:00 PM - 4:00PM ([facility])  Thursday:     Administrative Day  Friday:          Clinic 7:30 AM - 11:15 AM ([facility])            Clinic 1:00 PM - 4:00 PM ([facility])      [facility] for any issues or concerns: [phone]      We are not in the office Thursdays. Therefore non- urgent calls and medical messages received on Thursday will be addressed when we are back in the office on Wednesday. Urgent matters will be reviewed and addressed by one of our partners in the office as needed.    If lab work was done today as part of your evaluation you will generally be contacted via [employer], mail, or phone with the results within 1-5 days. If there is an alarming result we will contact you by phone. Lab results come back at varying times, I generally wait until all labs are resulted before making comments on results. Please note labs are automatically released to [employer] (if you have signed up for it) once available-at times you may see these prior to my having a chance to review them as well.    If you need refills please contact your pharmacist. They will send a refill request to me to review. Please allow 3 business days for us to process all refill requests. All narcotic refills should be handled in the clinic at the time of your visit.

## 2018-11-16 NOTE — PROGRESS NOTES
"Office Visit-Follow up    Chief Complaint: Dion López is a 68 year old male who is being seen for   Chief Complaint   Patient presents with     RECHECK     rt shoulder inj on 5-23-18 needs note for exercise       History of Present Illness:   Mechanism of Injury: No new injury  Location: Right shoulder  Duration of Pain: Continual pain  Rating of Pain: Mild to moderate  Pain Quality: Achy  Pain is better with: Rest  Pain is worse with: Activity  Treatment so far consists of: Cortisone injection done in the subacromial space on 5/23/2018.  Patient states this did not help him.  Patient has been doing home exercise program..   Associated Features: Patient talks majority of today's visit about his lumbar spine.  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  Pain is Limiting: With the right shoulder not much limitation.  With the lumbar spine difficulty driving bus  Here to: Follow-up on right shoulder and also asked questions about lumbar spine however he is to see a spine surgeon.  This was recommended by Dr. Henderson  Additional History: Patient joined the [employer] and wants to start doing more working out for his shoulder.    REVIEW OF SYSTEMS  General: negative for, night sweats, dizziness, fatigue  Resp: No shortness of breath and no cough  CV: negative for chest pain, syncope or near-syncope  GI: negative for nausea, vomiting and diarrhea  : negative for dysuria and hematuria  Musculoskeletal: as above  Neurologic: negative for syncope   Hematologic: negative for bleeding disorder    Physical Exam:  Vitals: /87  Ht 1.803 m (5' 11\")  Wt 87.1 kg (192 lb)  BMI 26.78 kg/m2  BMI= Body mass index is 26.78 kg/(m^2).  Constitutional: healthy, alert and no acute distress   Psychiatric: mentation appears normal and affect normal/bright  NEURO: no focal deficits, CMS intact Right upper extremity   RESP: Normal with easy respirations and no use of " accessory muscles to breathe, no audible wheezing or retractions  CV: +2 radial pulse and his hand is warm to palpation.   SKIN: No erythema, rashes, excoriation, or breakdown. No evidence of infection of the skin I can see today, I did not have him take his shirt off today.   MUSCULOSKELETAL:    INSPECTION of right shoulder: No gross deformities    PALPATION: No tenderness to palpation of the AC joint, proximal biceps tendon, clavicle, lateral shoulder, posterior shoulder, trapezius area. No increased warmth noted.     ROM: Forward flexion to approximately 170 , abduction to approximately 170 , external rotation to approximately 70 , internal rotation to lumbar spine.  All range of motion is without catching, locking or pain.        STRENGTH: 5 out of 5 , deltoid as well as internal and 4/5 external rotation    SPECIAL TEST: Patient has a negative Neer test, negative Frausto sign, negative cross over test, negative belly press and positive weakness with liftoff test, negative empty can and full can test, negative external rotator lag sign, negative drop test   GAIT: non-antalgic  Lymph: no palpable lymph nodes      Diagnostic Modalities:  No radiographs today      Impression: 1.  Right shoulder rotator cuff arthropathy.  2.  Lumbar spine stenosis with radiculopathy to the left lower extremity    Plan:  All of the above pertinent physical exam and imaging modalities findings was reviewed with Dion.                                          CONSERVATIVE CARE:    Patient Instructions:   1.  The cortisone injection that you received on 5/23/2018 you stated it did not help you much.  2.  You would like to work harder on doing physical therapy.  He just joined [employer].  You are doing some exercises for your shoulder but we would like you to do more specific ones.  3.  He also had a lot of questions about your spine.  Unfortunately we do not take care of spine.  I reviewed Dr. Henderson's note and she wanted you  to see a spine surgeon.  This would be Dr. Clemens.  We would advise you to see him sometime soon.  4. We ordered Physical Therapy for you.  Our team will call you in 1-2 days to help set this up at a location that is convenient for you.  The physical therapy will be for your right shoulder arthropathy and also your lumbar spine radiculopathy.  5. You can take the exercises they teach you there and work on them in your [employer] program.  6.  If therapy did not work for you then we can always discuss surgery on the shoulder which would be a reverse total shoulder however we do not do that unless you absolutely need it.  I have exercises you can start with below.  7. Follow up with Lizbeth García MD and/or Maico Muñoz PA-C on an as needed basis.   Re-x-ray on return: No    BP Readings from Last 1 Encounters:   11/16/18 148/87       Patient to follow up with Primary Care provider regarding elevated blood pressure.     Patient does not use Tobacco products.    Scribed by Maico Muñoz PA-C on 11/16/2018 at 1:50 PM, based on Dr. Lizbeth García's statements to me.    This note was dictated with [employer].    TROY Rojas MD

## 2018-11-16 NOTE — LETTER
"    11/16/2018         RE: Dion López  [address] [place] [address]        Dear Colleague,    Thank you for referring your patient, Dion López, to the [facility]. Please see a copy of my visit note below.    Office Visit-Follow up    Chief Complaint: Dion López is a 68 year old male who is being seen for   Chief Complaint   Patient presents with     RECHECK     rt shoulder inj on 5-23-18 needs note for exercise       History of Present Illness:   Mechanism of Injury: No new injury  Location: Right shoulder  Duration of Pain: Continual pain  Rating of Pain: Mild to moderate  Pain Quality: Achy  Pain is better with: Rest  Pain is worse with: Activity  Treatment so far consists of: Cortisone injection done in the subacromial space on 5/23/2018.  Patient states this did not help him.  Patient has been doing home exercise program..   Associated Features: Patient talks majority of today's visit about his lumbar spine.  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  Pain is Limiting: With the right shoulder not much limitation.  With the lumbar spine difficulty driving bus  Here to: Follow-up on right shoulder and also asked questions about lumbar spine however he is to see a spine surgeon.  This was recommended by Dr. Henderson  Additional History: Patient joined the [employer] and wants to start doing more working out for his shoulder.    REVIEW OF SYSTEMS  General: negative for, night sweats, dizziness, fatigue  Resp: No shortness of breath and no cough  CV: negative for chest pain, syncope or near-syncope  GI: negative for nausea, vomiting and diarrhea  : negative for dysuria and hematuria  Musculoskeletal: as above  Neurologic: negative for syncope   Hematologic: negative for bleeding disorder    Physical Exam:  Vitals: /87  Ht 1.803 m (5' 11\")  Wt 87.1 kg (192 lb)  BMI 26.78 kg/m2  BMI= Body mass index is 26.78 " kg/(m^2).  Constitutional: healthy, alert and no acute distress   Psychiatric: mentation appears normal and affect normal/bright  NEURO: no focal deficits, CMS intact Right upper extremity   RESP: Normal with easy respirations and no use of accessory muscles to breathe, no audible wheezing or retractions  CV: +2 radial pulse and his hand is warm to palpation.   SKIN: No erythema, rashes, excoriation, or breakdown. No evidence of infection of the skin I can see today, I did not have him take his shirt off today.   MUSCULOSKELETAL:    INSPECTION of right shoulder: No gross deformities    PALPATION: No tenderness to palpation of the AC joint, proximal biceps tendon, clavicle, lateral shoulder, posterior shoulder, trapezius area. No increased warmth noted.     ROM: Forward flexion to approximately 170 , abduction to approximately 170 , external rotation to approximately 70 , internal rotation to lumbar spine.  All range of motion is without catching, locking or pain.        STRENGTH: 5 out of 5 , deltoid as well as internal and 4/5 external rotation    SPECIAL TEST: Patient has a negative Neer test, negative Frausto sign, negative cross over test, negative belly press and positive weakness with liftoff test, negative empty can and full can test, negative external rotator lag sign, negative drop test   GAIT: non-antalgic  Lymph: no palpable lymph nodes      Diagnostic Modalities:  No radiographs today      Impression: 1.  Right shoulder rotator cuff arthropathy.  2.  Lumbar spine stenosis with radiculopathy to the left lower extremity    Plan:  All of the above pertinent physical exam and imaging modalities findings was reviewed with Dion.                                          CONSERVATIVE CARE:    Patient Instructions:   1.  The cortisone injection that you received on 5/23/2018 you stated it did not help you much.  2.  You would like to work harder on doing physical therapy.  He just joined [employer].   You are doing some exercises for your shoulder but we would like you to do more specific ones.  3.  He also had a lot of questions about your spine.  Unfortunately we do not take care of spine.  I reviewed Dr. Henderson's note and she wanted you to see a spine surgeon.  This would be Dr. Clemens.  We would advise you to see him sometime soon.  4. We ordered Physical Therapy for you.  Our team will call you in 1-2 days to help set this up at a location that is convenient for you.  The physical therapy will be for your right shoulder arthropathy and also your lumbar spine radiculopathy.  5. You can take the exercises they teach you there and work on them in your [employer] program.  6.  If therapy did not work for you then we can always discuss surgery on the shoulder which would be a reverse total shoulder however we do not do that unless you absolutely need it.  I have exercises you can start with below.  7. Follow up with Lizbeth García MD and/or Maico Muñoz PA-C on an as needed basis.   Re-x-ray on return: No    BP Readings from Last 1 Encounters:   11/16/18 148/87       Patient to follow up with Primary Care provider regarding elevated blood pressure.     Patient does not use Tobacco products.    Scribed by Maico Muñoz PA-C on 11/16/2018 at 1:50 PM, based on Dr. Lizbeth García's statements to me.    This note was dictated with [employer].    TROY Rojas MD          Again, thank you for allowing me to participate in the care of your patient.        Sincerely,        Lizbeth García MD

## 2018-11-16 NOTE — MR AVS SNAPSHOT
After Visit Summary   11/16/2018    Dion López    MRN: [id number]           Patient Information     Date Of Birth          1950        Visit Information        Provider Department      11/16/2018 1:00 PM Lizbeth García MD [facility]        Today's Diagnoses     Arthropathy of shoulder right    -  1    Lumbosacral spinal stenosis          Care Instructions    Encounter Diagnoses   Name Primary?     Arthropathy of shoulder right Yes     Lumbosacral spinal stenosis      Rest, ice and elevate above heart level as needed for pain control  1.  The cortisone injection that you received on 5/23/2018 you stated it did not help you much.  2.  You would like to work harder on doing physical therapy.  He just joined [employer].  You are doing some exercises for your shoulder but we would like you to do more specific ones.  3.  He also had a lot of questions about your spine.  Unfortunately we do not take care of spine.  I reviewed Dr. Henderson's note and she wanted you to see a spine surgeon.  This would be Dr. Clemens.  We would advise you to see him sometime soon.  4. We ordered Physical Therapy for you.  Our team will call you in 1-2 days to help set this up at a location that is convenient for you.  The physical therapy will be for your right shoulder arthropathy and also your lumbar spine radiculopathy.  5. You can take the exercises they teach you there and work on them in your [employer] program.  6.  If therapy did not work for you then we can always discuss surgery on the shoulder which would be a reverse total shoulder however we do not do that unless you absolutely need it.  I have exercises you can start with below.  7. Follow up with Lizbeth García MD and/or Maico Muñoz PA-C on an as needed basis.     Exercises for Shoulder Flexibility: External Rotation  This stretch can help restore shoulder flexibility and relieve pain over time. When stretching, be sure to  breathe deeply. Follow any special instructions from your doctor or physical therapist:     a doorway. Grasp the doorjamb with the hand on the frozen side. Your arm should be bent.    With the other hand, hold the elbow on the frozen side firmly against your body.    Standing in the same spot, rotate your body away from the doorjamb. Stop when you feel the stretch in the shoulder. At first, try to hold the stretch for 5 seconds.    Work up to doing 3 sets of this stretch, 3 times a day. Work up to holding the stretch for 30 to 60 seconds.  Note: Keep your arms as still as you can. Over time, rotate your body a little more to enhance the stretch. But be careful not to twist your back.        Exercises for Shoulder Flexibility: Internal Rotation    This stretch can help restore shoulder flexibility and relieve pain over time. When stretching, be sure to breathe deeply. Follow any special instructions from your healthcare provider or physical therapist.    While seated, move the arm on the side you want to stretch toward the middle of your back. The palm of your hand should face out.    Cup your other hand under the hand that s behind your back. Gently push your cupped hand upward until you feel the stretch in the shoulder. Try to hold the stretch for 5 seconds.    Work up to doing 3 sets of this stretch, 3 times a day. Work up to holding the stretch for 30 to 60 seconds.  Note: Keep your back straight. It s OK if your hand can t reach the middle of your back. Instead, start the stretch with your hand as close as you can get it to the middle of your back.      Exercises for Shoulder Flexibility: Wall Walk  Improving your flexibility can reduce pain. Stretching exercises also can help increase your range of pain-free motion. Breathe normally when you exercise. Use smooth, fluid movements.  Note: Follow any special instructions you are given. If you feel pain, stop the exercise. If the pain continues after  stopping, call your healthcare provider:    Stand with your shoulder about 2 feet from the wall.    Raise your arm to shoulder level and gently  walk  your fingers up the wall as high as you can.    Hold for a few seconds. Then walk your fingers back down.    Repeat 3 times. Move closer to the wall as you repeat.    Build up to holding each stretch for 30 seconds.  Caution: Do this stretch only if your healthcare provider recommends it. Don t do it when you are first injured.            4523-3761 [employer]. [address] [place] [address]. All rights reserved. This information is not intended as a substitute for professional medical care. Always follow your healthcare professional's instructions.      Shoulder Exercises: External Rotation  Strengthening exercises help make your injured shoulder more stable. To warm up, do flexibility (stretching) exercises first. Your healthcare provider will tell you what size hand weights to use for the strengthening exercise below. If you don t have hand weights, try using cans of soup instead:    Lie on your uninjured side with your head supported by a pillow or your arm. Place a small rolled-up towel under your top elbow.    Grasp a hand weight with your top hand and bend that arm to a right angle, resting your forearm against your stomach.    Keeping your elbow against the towel, slowly lift the weight until your forearm is slightly higher than your elbow. Return to the starting position. Repeat.    Work up to 5 to 15 lifts.    6255-5064 [employer]. [address] [place] [address]. All rights reserved. This information is not intended as a substitute for professional medical care. Always follow your healthcare professional's instructions.        Shoulder Exercises: Internal Rotation    Strengthening exercises help make your injured shoulder more stable. To warm up, do flexibility (stretching) exercises first. Your healthcare  provider will tell you what size hand weights to use for the strengthening exercise below. If you don t have hand weights, try using cans of soup instead:    With knees bent, lie on a firm surface. Using the hand on the same side as your injured shoulder, grasp a weight. Bend that arm to a right angle (90 degrees).    Rest your elbow on the floor.    Keeping your elbow next to your side, lower your forearm toward the floor, away from your body. Do not lower your hand all the way to the floor.    Slowly return your forearm to your side. Repeat.    Work up to 5 to 15 lifts.     Note: Support your head and neck with a pillow.     6610-9080 [employer]. [address] [place] [address]. All rights reserved. This information is not intended as a substitute for professional medical care. Always follow your healthcare professional's instructions.        Shoulder Exercises: Side Raise    This exercise stretches and strengthens your shoulders. Before starting, read through all the instructions. During the exercise, breathe normally and use smooth movements. Stop if you feel any pain. If pain persists, call your healthcare provider.    Stand straight, holding a ____ pound weight in each hand, arms at sides, feet shoulder-width apart.    Slowly extend your arms up and out until weights are at shoulder level. Slowly return to starting position.    Repeat ____ times. Do ____ sets ____ times a day.     CAUTION: Don t swing the weights or raise weights above shoulder level.     4245-4943 [employer]. [address] [place] [address]. All rights reserved. This information is not intended as a substitute for professional medical care. Always follow your healthcare professional's instructions.      [employer] may offer reliable information regarding your diagnosis and treatment plan.    THANK YOU for coming in today. If you receive a survey via [employer] or mail please let us  know if there was anything you especially appreciated today or if there is any way we can improve our clinic. We appreciate your input.    GENERAL INFORMATION:  Our hours are:  Monday :     Clinic 7:30 AM-430 PM ([facility])  Tuesday:      Operating Room All Day ([facility])  Wednesday: Clinic 7:30 AM - 11:15 AM ([facility])             Clinic 1:00 PM - 4:00PM ([facility])  Thursday:     Administrative Day  Friday:          Clinic 7:30 AM - 11:15 AM ([facility])            Clinic 1:00 PM - 4:00 PM ([facility])      [facility] for any issues or concerns: [phone]      We are not in the office Thursdays. Therefore non- urgent calls and medical messages received on Thursday will be addressed when we are back in the office on Wednesday. Urgent matters will be reviewed and addressed by one of our partners in the office as needed.    If lab work was done today as part of your evaluation you will generally be contacted via [employer], mail, or phone with the results within 1-5 days. If there is an alarming result we will contact you by phone. Lab results come back at varying times, I generally wait until all labs are resulted before making comments on results. Please note labs are automatically released to [employer] (if you have signed up for it) once available-at times you may see these prior to my having a chance to review them as well.    If you need refills please contact your pharmacist. They will send a refill request to me to review. Please allow 3 business days for us to process all refill requests. All narcotic refills should be handled in the clinic at the time of your visit.           Follow-ups after your visit        Who to contact     If you have questions or need follow up information about today's clinic visit or your schedule please contact [facility] directly at  "[phone].  Normal or non-critical lab and imaging results will be communicated to you by MyChart, letter or phone within 4 business days after the clinic has received the results. If you do not hear from us within 7 days, please contact the clinic through MyChart or phone. If you have a critical or abnormal lab result, we will notify you by phone as soon as possible.  Submit refill requests through [employer] or call your pharmacy and they will forward the refill request to us. Please allow 3 business days for your refill to be completed.          Additional Information About Your Visit        Care EveryWhere ID     This is your Care EveryWhere ID. This could be used by other organizations to access your [place] medical records  [id number]        Your Vitals Were     Height BMI (Body Mass Index)                1.803 m (5' 11\") 26.78 kg/m2           Blood Pressure from Last 3 Encounters:   11/16/18 148/87   11/05/18 150/86   10/26/18 141/88    Weight from Last 3 Encounters:   11/16/18 87.1 kg (192 lb)   11/05/18 87.4 kg (192 lb 9.6 oz)   10/26/18 87.4 kg (192 lb 9.6 oz)              Today, you had the following     No orders found for display       Primary Care Provider Office Phone # Fax #    [place] Maya Mai, [phone] [address] [place] [address]        Equal Access to Services     [facility]: Hadii aad ku hadasho Soomaali, waaxda luqadaha, qaybta kaalmada adeemeraldyada, desi rascon . So [facility] [phone].    ATENCIÓN: Si habla español, tiene a garcia disposición servicios gratuitos de asistencia lingüística. Llame al [phone].    We comply with applicable federal civil rights laws and [place] laws. We do not discriminate on the basis of race, color, national origin, age, disability, sex, sexual orientation, or gender identity.            Thank you!     Thank you for choosing [facility]  for your care. Our goal is always to provide you " with excellent care. Hearing back from our patients is one way we can continue to improve our services. Please take a few minutes to complete the written survey that you may receive in the mail after your visit with us. Thank you!             Your Updated Medication List - Protect others around you: Learn how to safely use, store and throw away your medicines at www.disposemymeds.org.          This list is accurate as of 11/16/18  1:47 PM.  Always use your most recent med list.                   Brand Name Dispense Instructions for use Diagnosis    aspirin 81 MG EC tablet     90 tablet    Take 1 tablet (81 mg) by mouth daily    Hyperlipidemia LDL goal <70       atorvastatin 20 MG tablet    LIPITOR    90 tablet    Take 1 tablet (20 mg) by mouth daily    Abnormal cardiovascular stress test       calcium carbonate 500 mg (elemental) 500 MG tablet    OS-QUIANA     Take 1 tablet by mouth daily        cinnamon 500 MG Tabs           IRON SUPPLEMENT PO      Take 325 mg by mouth daily        MAGNESIUM OXIDE PO      Take 200 mg by mouth daily        metoprolol tartrate 25 MG tablet    LOPRESSOR    90 tablet    Take 0.5 tablets (12.5 mg) by mouth 2 times daily    HTN (hypertension)       MULTIPLE VITAMIN PO      Take 1 tablet by mouth daily        NAPROXEN PO      Take 250 mg by mouth 2 times daily (with meals)        nitroGLYcerin 0.4 MG sublingual tablet    NITROSTAT    25 tablet    For chest pain place 1 tablet under the tongue every 5 minutes for 3 doses. If symptoms persist 5 minutes after 1st dose call 911.    Atypical chest pain       omega 3 1000 MG Caps     90 capsule    Take 1 g by mouth daily        PROBIOTIC DAILY PO      Take 1 tablet by mouth daily        RANITIDINE HCL PO      Take 150 mg by mouth daily        vitamin b complex w/vitamin C Tabs tablet      Take 1 tablet by mouth daily        VITAMIN C PO      Take 1,000 mg by mouth daily

## 2018-12-13 ENCOUNTER — OFFICE VISIT (OUTPATIENT)
Dept: NEUROSURGERY | Facility: CLINIC | Age: 68
End: 2018-12-13
Payer: COMMERCIAL

## 2018-12-13 VITALS
SYSTOLIC BLOOD PRESSURE: 147 MMHG | BODY MASS INDEX: 26.88 KG/M2 | OXYGEN SATURATION: 97 % | DIASTOLIC BLOOD PRESSURE: 87 MMHG | HEART RATE: 51 BPM | TEMPERATURE: 97 F | WEIGHT: 192 LBS | HEIGHT: 71 IN

## 2018-12-13 DIAGNOSIS — M51.369 LUMBAR DEGENERATIVE DISC DISEASE: Primary | ICD-10-CM

## 2018-12-13 PROCEDURE — 99203 OFFICE O/P NEW LOW 30 MIN: CPT | Performed by: NURSE PRACTITIONER

## 2018-12-13 ASSESSMENT — PAIN SCALES - GENERAL: PAINLEVEL: MILD PAIN (2)

## 2018-12-13 ASSESSMENT — MIFFLIN-ST. JEOR: SCORE: 1658.04

## 2018-12-13 NOTE — PATIENT INSTRUCTIONS
-Schedule lumbar epidural steroid injection (someone will contact you)  -Please contact the clinic if pain persists and no benefit and schedule with Dr. Arellano with updated lumbar MRI at [phone].        Patient to follow up with Primary Care provider regarding elevated blood pressure.
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
## 2018-12-13 NOTE — NURSING NOTE
"Dion López is a 68 year old male who presents for:  Chief Complaint   Patient presents with     Neurologic Problem     referral from Dr. García for lumbrosacral stenosis, bilateral low back pain with bilateral sciatica        Vitals:    Vitals:    12/13/18 1052 12/13/18 1125   BP: (!) 172/91 147/87   BP Location: Right arm Right arm   Patient Position: Sitting Sitting   Cuff Size: Adult Regular Adult Regular   Pulse: 51    Temp: 97  F (36.1  C)    TempSrc: Oral    SpO2: 97%    Weight: 192 lb (87.1 kg)    Height: 5' 11\" (1.803 m)        BMI:  Estimated body mass index is 26.78 kg/m  as calculated from the following:    Height as of this encounter: 5' 11\" (1.803 m).    Weight as of this encounter: 192 lb (87.1 kg).    Pain Score:  Mild Pain (2)        Shirlene Velázquez CMA, AAS      "

## 2019-01-17 ENCOUNTER — TELEPHONE (OUTPATIENT)
Dept: FAMILY MEDICINE | Facility: CLINIC | Age: 69
End: 2019-01-17

## 2019-01-17 ENCOUNTER — HOSPITAL ENCOUNTER (EMERGENCY)
Facility: CLINIC | Age: 69
Discharge: HOME | End: 2019-01-17
Attending: EMERGENCY MEDICINE | Admitting: EMERGENCY MEDICINE
Payer: COMMERCIAL

## 2019-01-17 VITALS
RESPIRATION RATE: 14 BRPM | HEART RATE: 48 BPM | TEMPERATURE: 97.4 F | SYSTOLIC BLOOD PRESSURE: 133 MMHG | DIASTOLIC BLOOD PRESSURE: 83 MMHG | OXYGEN SATURATION: 95 %

## 2019-01-17 DIAGNOSIS — R07.9 CHEST PAIN, UNSPECIFIED TYPE: ICD-10-CM

## 2019-01-17 LAB
ANION GAP SERPL CALCULATED.3IONS-SCNC: 7 MMOL/L (ref 3–14)
BASOPHILS # BLD AUTO: 0.1 10E9/L (ref 0–0.2)
BASOPHILS NFR BLD AUTO: 0.7 %
BUN SERPL-MCNC: 26 MG/DL (ref 7–30)
CALCIUM SERPL-MCNC: 8.5 MG/DL (ref 8.5–10.1)
CHLORIDE SERPL-SCNC: 109 MMOL/L (ref 94–109)
CO2 SERPL-SCNC: 26 MMOL/L (ref 20–32)
CREAT SERPL-MCNC: 0.82 MG/DL (ref 0.66–1.25)
DIFFERENTIAL METHOD BLD: ABNORMAL
EOSINOPHIL NFR BLD AUTO: 2.6 %
ERYTHROCYTE [DISTWIDTH] IN BLOOD BY AUTOMATED COUNT: 12.9 % (ref 10–15)
GFR SERPL CREATININE-BSD FRML MDRD: 90 ML/MIN/{1.73_M2}
GLUCOSE SERPL-MCNC: 107 MG/DL (ref 70–99)
HCT VFR BLD AUTO: 39.9 % (ref 40–53)
HGB BLD-MCNC: 13.8 G/DL (ref 13.3–17.7)
IMM GRANULOCYTES # BLD: 0 10E9/L (ref 0–0.4)
IMM GRANULOCYTES NFR BLD: 0.1 %
LYMPHOCYTES # BLD AUTO: 2.4 10E9/L (ref 0.8–5.3)
LYMPHOCYTES NFR BLD AUTO: 31.3 %
MCH RBC QN AUTO: 30.9 PG (ref 26.5–33)
MCHC RBC AUTO-ENTMCNC: 34.6 G/DL (ref 31.5–36.5)
MCV RBC AUTO: 90 FL (ref 78–100)
MONOCYTES # BLD AUTO: 0.7 10E9/L (ref 0–1.3)
MONOCYTES NFR BLD AUTO: 9 %
NEUTROPHILS # BLD AUTO: 4.3 10E9/L (ref 1.6–8.3)
NEUTROPHILS NFR BLD AUTO: 56.3 %
NRBC # BLD AUTO: 0 10*3/UL
NRBC BLD AUTO-RTO: 0 /100
PLATELET # BLD AUTO: 224 10E9/L (ref 150–450)
POTASSIUM SERPL-SCNC: 4 MMOL/L (ref 3.4–5.3)
RBC # BLD AUTO: 4.46 10E12/L (ref 4.4–5.9)
SODIUM SERPL-SCNC: 142 MMOL/L (ref 133–144)
TROPONIN I SERPL-MCNC: <0.015 UG/L (ref 0–0.04)
WBC # BLD AUTO: 7.6 10E9/L (ref 4–11)

## 2019-01-17 PROCEDURE — 85025 COMPLETE CBC W/AUTO DIFF WBC: CPT | Performed by: EMERGENCY MEDICINE

## 2019-01-17 PROCEDURE — 99284 EMERGENCY DEPT VISIT MOD MDM: CPT | Mod: Z6 | Performed by: EMERGENCY MEDICINE

## 2019-01-17 PROCEDURE — 99284 EMERGENCY DEPT VISIT MOD MDM: CPT | Mod: 25 | Performed by: EMERGENCY MEDICINE

## 2019-01-17 PROCEDURE — 84484 ASSAY OF TROPONIN QUANT: CPT | Performed by: EMERGENCY MEDICINE

## 2019-01-17 PROCEDURE — 93005 ELECTROCARDIOGRAM TRACING: CPT | Performed by: EMERGENCY MEDICINE

## 2019-01-17 PROCEDURE — 80048 BASIC METABOLIC PNL TOTAL CA: CPT | Performed by: EMERGENCY MEDICINE

## 2019-01-17 ASSESSMENT — ENCOUNTER SYMPTOMS
HEMATURIA: 0
CONFUSION: 0
HEADACHES: 0
PALPITATIONS: 0
DIARRHEA: 0
LIGHT-HEADEDNESS: 0
CHILLS: 0
COLOR CHANGE: 0
ACTIVITY CHANGE: 0
SHORTNESS OF BREATH: 0
SORE THROAT: 0
FATIGUE: 0
ABDOMINAL PAIN: 0
DIFFICULTY URINATING: 0
MYALGIAS: 0
VOMITING: 0
ARTHRALGIAS: 0
RHINORRHEA: 0
SLEEP DISTURBANCE: 0
APPETITE CHANGE: 0
ABDOMINAL DISTENTION: 0
BLOOD IN STOOL: 0
DIAPHORESIS: 0
NAUSEA: 0
COUGH: 0
SINUS PRESSURE: 0
SINUS PAIN: 0
APNEA: 0
FEVER: 0
DIZZINESS: 0

## 2019-01-17 NOTE — TELEPHONE ENCOUNTER
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      Please transfer call to Luli Stockton when he calls back.    Luli Stockton, ARTURON, RN

## 2019-01-17 NOTE — TELEPHONE ENCOUNTER
Please triage - with his history of heart dz, recommended to be evaluated today - either clinic or ER.  thanks

## 2019-01-17 NOTE — TELEPHONE ENCOUNTER
Reason for Call:  Same Day Appointment, Requested Provider:  Sharri Bonner M.D.     PCP: Sharri Bonner    Reason for visit: Heart Check     Duration of symptoms: 3 weeks    Have you been treated for this in the past? No    Additional comments: Patient has been triaged and spoke with specialty to Dr Santiago but he is unavailable. Patient stating his chest pain has been waking him at night. Patient would like to be seen tomorrow in clinic if possible. Please call and advise.     Can we leave a detailed message on this number? YES    Phone number patient can be reached at: Home number on file [phone] (home)    Best Time: any    Call taken on 1/17/2019 at 10:40 AM by Damaris Allen

## 2019-01-17 NOTE — TELEPHONE ENCOUNTER
Patient is calling back.  He is informed that writer cannot make him go to the ED, and if he wants to put his own life in danger, that is his choice.  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  He is asked what his plan is.  He states he will call for a substitute  for the afternoon and will wait to get a ride to the ED.  He is informed this is a good decision as we do not want to put other people in danger.    Closing this encounter.  Luli Stockton, ARTURON, RN

## 2019-01-17 NOTE — DISCHARGE INSTRUCTIONS
I am uncertain as to the cause of your chest pain.  I think is most likely related to acid reflux.  I would try not eating for 3 hours prior to going to sleep.  Eat smaller meals.  Avoid greasy and spicy foods.  You may try Prilosec instead of the ranitidine.  If symptoms persist I recommend an EGD/upper endoscopy.  Your tests for your heart as the cause of these symptoms were all normal today.  If your symptoms change return to the emergency department.

## 2019-01-17 NOTE — LETTER
January 17, 2019      To Whom It May Concern:      Dion López was seen in our Emergency Department today, 01/17/19.  I expect his condition to improve over the next day.  He may return to work.    Sincerely,        Liam Bryan MD

## 2019-01-17 NOTE — ED TRIAGE NOTES
Pt presents with chest pain.  Pt states that he has had chest pain for the last 3 weeks.  Pt states that last night (0200) the pain woke him up, pt went back to sleep.  Pt states that his chest pain is a 2/10 at this time.  Pain is located in the epigastric area.  Pt denies shortness of breath or nausea at this time.

## 2019-01-17 NOTE — ED PROVIDER NOTES
History     Chief Complaint   Patient presents with     Chest Pain     HPI  Dion López is a 68 year old male who presents with 3 week history of intermittent epigastric chest pain. 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 He states last night he ate pizza around 8pm, went to bed at 9pm and he had an episode that woke him up at 2AM lasted for few minutes of substernal chest pressure that he also felt in his pectoral muscles. Denies pain radiating to jaw or arms, SOB, diaphoresis or dizziness at that time or in any previous episodes. He recalls having chest pain similar in the past which he was prescribed Nitroglycerin for, however he never used the medication. He has PMH of HTN and GERD and chronic constipation,which he takes lisinopril, rantidine, and stool softeners for. With the exception of last night, he cannot correlate episodes with timing of meals or diet. He denies abdominal pain, changes in bowel habits, blood in stool, decreased energy, fevers, chills, N/V, smoking, EtOH use, illicit drug use,or exposure to sick contacts.     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     Priority: Medium     Essential hypertension 11/24/2017     Priority: Medium     Counseling regarding advanced directives 10/14/2016     Priority: Medium        Past Medical History:    Past Medical History:   Diagnosis Date     NO ACTIVE PROBLEMS        Past Surgical History:    Past Surgical History:   Procedure Laterality Date     AMPUTATION FINGER/THUMB       NO HISTORY OF SURGERY         Family History:    Family History   Problem Relation Age of Onset     Coronary Artery Disease Mother 55     Hypertension Mother      Other Cancer Father         lung cancer     Unknown/Adopted Maternal Grandmother      Unknown/Adopted Maternal Grandfather       Unknown/Adopted Paternal Grandmother      Unknown/Adopted Paternal Grandfather      Substance Abuse Brother         alcoholism     Other Cancer Sister         throat cancer     Seizure Disorder Brother      Hypertension Sister        Social History:  Marital Status:   [5]  Social History     Tobacco Use     Smoking status: Former Smoker     Last attempt to quit: 1978     Years since quittin.3     Smokeless tobacco: Never Used     Tobacco comment: former smoker   Substance Use Topics     Alcohol use: No     Alcohol/week: 0.0 oz     Drug use: No        Medications:      Ascorbic Acid (VITAMIN C PO)   atorvastatin (LIPITOR) 20 MG tablet   B Complex-C (VITAMIN B COMPLEX W/VITAMIN C) TABS tablet   calcium carbonate (OS-QUIANA 500 MG [place]. [place]) 1250 MG tablet   cinnamon 500 MG TABS   Ferrous Sulfate (IRON SUPPLEMENT PO)   LISINOPRIL PO   metoprolol tartrate (LOPRESSOR) 25 MG tablet   MULTIPLE VITAMIN PO   NAPROXEN PO   omega 3 1000 MG CAPS   Probiotic Product (PROBIOTIC DAILY PO)   RANITIDINE HCL PO   nitroGLYcerin (NITROSTAT) 0.4 MG sublingual tablet         Review of Systems   Constitutional: Negative for activity change, appetite change, chills, diaphoresis, fatigue and fever.   HENT: Negative for rhinorrhea, sinus pressure, sinus pain and sore throat.    Eyes: Negative for visual disturbance.   Respiratory: Negative for apnea, cough and shortness of breath.    Cardiovascular: Negative for palpitations and leg swelling.   Gastrointestinal: Negative for abdominal distention, abdominal pain, blood in stool, diarrhea, nausea and vomiting.   Genitourinary: Negative for difficulty urinating and hematuria.   Musculoskeletal: Negative for arthralgias and myalgias.   Skin: Negative for color change and rash.   Neurological: Negative for dizziness, syncope, light-headedness and headaches.   Psychiatric/Behavioral: Negative for confusion and sleep disturbance.       Physical Exam   BP: 162/90  Pulse: 52  Heart Rate:  52  Temp: 97.4  F (36.3  C)  Resp: 18  SpO2: 98 %      Physical Exam   Constitutional: He is oriented to person, place, and time. He appears well-developed and well-nourished. No distress.   HENT:   Head: Normocephalic and atraumatic.   Mouth/Throat: Oropharynx is clear and moist.   Eyes: EOM are normal. Pupils are equal, round, and reactive to light.   Cardiovascular: Regular rhythm and normal heart sounds.   Pulmonary/Chest: Effort normal and breath sounds normal. No respiratory distress. He exhibits no tenderness.   Abdominal: Soft. Bowel sounds are normal. He exhibits no distension and no mass. There is no tenderness. There is no rebound and no guarding. No hernia.   Neurological: He is alert and oriented to person, place, and time.   Skin: Skin is warm and dry. He is not diaphoretic.   Psychiatric: He has a normal mood and affect. His behavior is normal. Judgment and thought content normal.   Vitals reviewed.      ED Course        Procedures                   Results for orders placed or performed during the hospital encounter of 01/17/19 (from the past 24 hour(s))   CBC with platelets differential   Result Value Ref Range    WBC 7.6 4.0 - 11.0 10e9/L    RBC Count 4.46 4.4 - 5.9 10e12/L    Hemoglobin 13.8 13.3 - 17.7 g/dL    Hematocrit 39.9 (L) 40.0 - 53.0 %    MCV 90 78 - 100 fl    MCH 30.9 26.5 - 33.0 pg    MCHC 34.6 31.5 - 36.5 g/dL    RDW 12.9 10.0 - 15.0 %    Platelet Count 224 150 - 450 10e9/L    Diff Method Automated Method     % Neutrophils 56.3 %    % Lymphocytes 31.3 %    % Monocytes 9.0 %    % Eosinophils 2.6 %    % Basophils 0.7 %    % Immature Granulocytes 0.1 %    Nucleated RBCs 0 0 /100    Absolute Neutrophil 4.3 1.6 - 8.3 10e9/L    Absolute Lymphocytes 2.4 0.8 - 5.3 10e9/L    Absolute Monocytes 0.7 0.0 - 1.3 10e9/L    Absolute Basophils 0.1 0.0 - 0.2 10e9/L    Abs Immature Granulocytes 0.0 0 - 0.4 10e9/L    Absolute Nucleated RBC 0.0    Basic metabolic panel   Result Value Ref Range    Sodium  142 133 - 144 mmol/L    Potassium 4.0 3.4 - 5.3 mmol/L    Chloride 109 94 - 109 mmol/L    Carbon Dioxide 26 20 - 32 mmol/L    Anion Gap 7 3 - 14 mmol/L    Glucose 107 (H) 70 - 99 mg/dL    Urea Nitrogen 26 7 - 30 mg/dL    Creatinine 0.82 0.66 - 1.25 mg/dL    GFR Estimate 90 >60 mL/min/[1.73_m2]    GFR Estimate If Black >90 >60 mL/min/[1.73_m2]    Calcium 8.5 8.5 - 10.1 mg/dL   Troponin I   Result Value Ref Range    Troponin I ES <0.015 0.000 - 0.045 ug/L       Medications - No data to display    Assessments & Plan (with Medical Decision Making)  Substernal chest pain with a negative troponin.  This is been going on for 3 weeks and does not have associated symptoms.  It is short-lived.  There is no diaphoresis or nausea.  It does not come on with exertion.  He had a essentially normal angiogram in 2017.  He exercises fairly regularly without symptoms.  I am uncertain as to the etiology of this although it may be GERD or gastritis.  There is no evidence for a PE or other dangerous pathology.  I recommend switching to omeprazole.  Change his diet.  Not eating for 3 hours prior to laying down at night.  Upper endoscopy if these do not improve. The differential diagnosis, treatment options, risks and follow up discussed with a competent patient and/or competent family member who agrees with the plan.       I have reviewed the nursing notes.    I have reviewed the findings, diagnosis, plan and need for follow up with the patient.         Medication List      There are no discharge medications for this visit.         Final diagnoses:   Chest pain, unspecified type       1/17/2019   [facility] EMERGENCY DEPARTMENT     Liam Bryan MD  01/17/19 5775

## 2019-01-17 NOTE — ED AVS SNAPSHOT
[facility] Emergency Department  [address] [place] [address]  Phone:  [phone]  Fax:  [phone]                                    Dion López   MRN: [id number]    Department:  [facility] Emergency Department   Date of Visit:  1/17/2019           After Visit Summary Signature Page    I have received my discharge instructions, and my questions have been answered. I have discussed any challenges I see with this plan with the nurse or doctor.    ..........................................................................................................................................  Patient/Patient Representative Signature      ..........................................................................................................................................  Patient Representative Print Name and Relationship to Patient    ..................................................               ................................................  Date                                   Time    ..........................................................................................................................................  Reviewed by Signature/Title    ...................................................              ..............................................  Date                                               Time          22EPIC Rev 08/18

## 2019-01-17 NOTE — TELEPHONE ENCOUNTER
Dion López is a 68 year old male who calls with chest pain, SOB, arm and shoulder pain. Pt states his chest pain has been happening 3x/day for 3 weeks and lasting 7-10 minutes. He reports pain has recently intensified and woke him up last night. Pt rated pain as 8/10. Pain happens at rest as well as with exertion. Advised pt to be seen in ED. 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 Pt states his symptoms have been going on for 3 weeks, and he is able to call police and ambulance from the bus if needed. I advised pt this is not safe, and he should be seen in the ED, and if not to take off of work until he can be seen in clinic. Pt declines, and is requesting appointment in clinic. Pt has not tried any Nitroglycerin yet. Advised pt to do so as prescribed and report to ED. Huddled with Dr. Shell who states to encourage pt to be seen in ED and to not drive; also stated to put pt on Dr. Bonner's schedule tomorrow if pt declines ED again.     NURSING ASSESSMENT:  Description:  Chest Pain, SOB, Arm pain, shoulder pain  Onset/duration:  3 weeks  Precip. factors:  Heart hx  Associated symptoms:  See above  Improves/worsens symptoms:  No change  Pain scale (0-10)   8/10  Last exam/Treatment:  9/14/17  Allergies: No Known Allergies    MEDICATIONS:   Taking medication(s) as prescribed? No  Any medication side effects? Not Applicable    Any barriers to taking medication(s) as prescribed?  No  Pt reports he will try taking the Nitroglycerin he has.  Medication(s) improving/managing symptoms?  N/A    NURSING PLAN: Huddle with provider, plan includes Strongly recommended ED to pt.  Routing to PCP as FYI.     RECOMMENDED DISPOSITION:  To ED, another person to drive -    Will comply with recommendation: No- Barriers to comply with plan of care Patient requests to be seen in clinic and will not go to ED d/t  cost. 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 Appointment made with Dr. Bonner in clinic tomorrow. .  If further questions/concerns or if symptoms do not improve, worsen or new symptoms develop, call your PCP or [facility] as soon as possible.      Guideline used: Chest Pain  Telephone Triage Protocols for Nurses, Fifth Edition, Rachel Tidwell RN

## 2019-03-29 ENCOUNTER — HOSPITAL ENCOUNTER (EMERGENCY)
Facility: CLINIC | Age: 69
Discharge: HOME | End: 2019-03-29
Attending: FAMILY MEDICINE | Admitting: FAMILY MEDICINE
Payer: COMMERCIAL

## 2019-03-29 VITALS
OXYGEN SATURATION: 99 % | TEMPERATURE: 97.9 F | DIASTOLIC BLOOD PRESSURE: 72 MMHG | HEART RATE: 76 BPM | SYSTOLIC BLOOD PRESSURE: 128 MMHG | RESPIRATION RATE: 16 BRPM

## 2019-03-29 DIAGNOSIS — K21.9 GASTROESOPHAGEAL REFLUX DISEASE, ESOPHAGITIS PRESENCE NOT SPECIFIED: ICD-10-CM

## 2019-03-29 DIAGNOSIS — K27.9 PEPTIC ULCER DISEASE: ICD-10-CM

## 2019-03-29 LAB
ALBUMIN SERPL-MCNC: 3.8 G/DL (ref 3.4–5)
ALP SERPL-CCNC: 114 U/L (ref 40–150)
ALT SERPL W P-5'-P-CCNC: 36 U/L (ref 0–70)
ANION GAP SERPL CALCULATED.3IONS-SCNC: 10 MMOL/L (ref 3–14)
AST SERPL W P-5'-P-CCNC: 41 U/L (ref 0–45)
BASOPHILS # BLD AUTO: 0.1 10E9/L (ref 0–0.2)
BASOPHILS NFR BLD AUTO: 0.6 %
BILIRUB SERPL-MCNC: 0.5 MG/DL (ref 0.2–1.3)
BUN SERPL-MCNC: 19 MG/DL (ref 7–30)
CALCIUM SERPL-MCNC: 8.5 MG/DL (ref 8.5–10.1)
CHLORIDE SERPL-SCNC: 106 MMOL/L (ref 94–109)
CO2 SERPL-SCNC: 26 MMOL/L (ref 20–32)
CREAT SERPL-MCNC: 0.94 MG/DL (ref 0.66–1.25)
CRP SERPL-MCNC: <2.9 MG/L (ref 0–8)
D DIMER PPP FEU-MCNC: 0.4 UG/ML FEU (ref 0–0.5)
DIFFERENTIAL METHOD BLD: NORMAL
EOSINOPHIL NFR BLD AUTO: 2.4 %
ERYTHROCYTE [DISTWIDTH] IN BLOOD BY AUTOMATED COUNT: 12.8 % (ref 10–15)
GFR SERPL CREATININE-BSD FRML MDRD: 82 ML/MIN/{1.73_M2}
GLUCOSE SERPL-MCNC: 93 MG/DL (ref 70–99)
HCT VFR BLD AUTO: 42.5 % (ref 40–53)
HGB BLD-MCNC: 14.8 G/DL (ref 13.3–17.7)
IMM GRANULOCYTES # BLD: 0 10E9/L (ref 0–0.4)
IMM GRANULOCYTES NFR BLD: 0.3 %
LIPASE SERPL-CCNC: 127 U/L (ref 73–393)
LYMPHOCYTES # BLD AUTO: 2.3 10E9/L (ref 0.8–5.3)
LYMPHOCYTES NFR BLD AUTO: 28.8 %
MCH RBC QN AUTO: 31.4 PG (ref 26.5–33)
MCHC RBC AUTO-ENTMCNC: 34.8 G/DL (ref 31.5–36.5)
MCV RBC AUTO: 90 FL (ref 78–100)
MONOCYTES # BLD AUTO: 0.7 10E9/L (ref 0–1.3)
MONOCYTES NFR BLD AUTO: 8.3 %
NEUTROPHILS # BLD AUTO: 4.7 10E9/L (ref 1.6–8.3)
NEUTROPHILS NFR BLD AUTO: 59.6 %
NRBC # BLD AUTO: 0 10*3/UL
NRBC BLD AUTO-RTO: 0 /100
PLATELET # BLD AUTO: 255 10E9/L (ref 150–450)
POTASSIUM SERPL-SCNC: 4.6 MMOL/L (ref 3.4–5.3)
PROT SERPL-MCNC: 7.1 G/DL (ref 6.8–8.8)
RBC # BLD AUTO: 4.71 10E12/L (ref 4.4–5.9)
SODIUM SERPL-SCNC: 142 MMOL/L (ref 133–144)
TROPONIN I SERPL-MCNC: <0.015 UG/L (ref 0–0.04)
WBC # BLD AUTO: 7.9 10E9/L (ref 4–11)

## 2019-03-29 PROCEDURE — 85025 COMPLETE CBC W/AUTO DIFF WBC: CPT | Performed by: FAMILY MEDICINE

## 2019-03-29 PROCEDURE — 93005 ELECTROCARDIOGRAM TRACING: CPT | Performed by: FAMILY MEDICINE

## 2019-03-29 PROCEDURE — 85379 FIBRIN DEGRADATION QUANT: CPT | Performed by: FAMILY MEDICINE

## 2019-03-29 PROCEDURE — 93010 ELECTROCARDIOGRAM REPORT: CPT | Mod: Z6 | Performed by: FAMILY MEDICINE

## 2019-03-29 PROCEDURE — 99284 EMERGENCY DEPT VISIT MOD MDM: CPT | Mod: 25 | Performed by: FAMILY MEDICINE

## 2019-03-29 PROCEDURE — 84484 ASSAY OF TROPONIN QUANT: CPT | Performed by: FAMILY MEDICINE

## 2019-03-29 PROCEDURE — 86140 C-REACTIVE PROTEIN: CPT | Performed by: FAMILY MEDICINE

## 2019-03-29 PROCEDURE — 80053 COMPREHEN METABOLIC PANEL: CPT | Performed by: FAMILY MEDICINE

## 2019-03-29 PROCEDURE — 83690 ASSAY OF LIPASE: CPT | Performed by: FAMILY MEDICINE

## 2019-03-29 ASSESSMENT — ENCOUNTER SYMPTOMS
CONSTIPATION: 1
POLYDIPSIA: 0
BACK PAIN: 0
BLOOD IN STOOL: 0
WHEEZING: 0
VOMITING: 0
COUGH: 0
PALPITATIONS: 0
CHEST TIGHTNESS: 0
WEAKNESS: 0
DIARRHEA: 0
LIGHT-HEADEDNESS: 0
HEMATURIA: 0
CONFUSION: 0
FEVER: 0
DIZZINESS: 0
DYSURIA: 0
EYE REDNESS: 0
NERVOUS/ANXIOUS: 0
ABDOMINAL PAIN: 1
SHORTNESS OF BREATH: 0
NAUSEA: 0
CHILLS: 0

## 2019-03-29 NOTE — ED AVS SNAPSHOT
[facility] Emergency Department  [address] [place] [address]  Phone:  [phone]  Fax:  [phone]                                    Dion López   MRN: [id number]    Department:  [facility] Emergency Department   Date of Visit:  3/29/2019           After Visit Summary Signature Page    I have received my discharge instructions, and my questions have been answered. I have discussed any challenges I see with this plan with the nurse or doctor.    ..........................................................................................................................................  Patient/Patient Representative Signature      ..........................................................................................................................................  Patient Representative Print Name and Relationship to Patient    ..................................................               ................................................  Date                                   Time    ..........................................................................................................................................  Reviewed by Signature/Title    ...................................................              ..............................................  Date                                               Time          22EPIC Rev 08/18

## 2019-03-29 NOTE — ED TRIAGE NOTES
Here with chest pain that he has been having for months. States he was seen in Jan for the chest pain and was to follow up and get a endoscopy. States he did not do it because he thought it would just go away. He also states he drank a bottle of Mag citrate last night for constpation and had good results

## 2019-03-30 NOTE — ED PROVIDER NOTES
History     Chief Complaint   Patient presents with     Chest Pain     HPI  Dion López is a 68 year old male who returns to the emergency room with the reoccurrence of his left upper quadrant epigastric-like abdominal pain.  He describes this as lower chest pain but actually points to his epigastric area.  He was seen back in January for this and it was felt that his symptoms were from GERD and reflux.  An EKG showed no acute changes.  He was also ruled out with a troponin.  He has no exertional symptoms.  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  He has no symptoms with exertion and actually feels quite well if he is exercising.  He has had no fever or chills.  No weight loss.  No cough or congestion.  He has frequent constipation and tried mag citrate yesterday with good results and no change in this abdominal discomfort.  It was recommended that he have an endoscopy but he has decided not to follow-up in regards to this.  It was also recommended that he be on a proton pump inhibitor but he has elected to use ranitidine instead.  He has never had any hematemesis.  He has never had a colonoscopy or endoscopy.  He denies weight loss or night sweats.  He otherwise feels quite well.  He relates having this problem for the last several months but then and further history taking it sounds like this is been in ongoing problem for years.    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     Priority: Medium     Essential hypertension 11/24/2017     Priority: Medium     Counseling regarding advanced directives 10/14/2016     Priority: Medium        Past Medical History:    Past Medical History:   Diagnosis Date     Hypertension      NO ACTIVE PROBLEMS        Past Surgical History:    Past Surgical History:   Procedure Laterality Date     AMPUTATION  FINGER/THUMB       NO HISTORY OF SURGERY         Family History:    Family History   Problem Relation Age of Onset     Coronary Artery Disease Mother 55     Hypertension Mother      Other Cancer Father         lung cancer     Unknown/Adopted Maternal Grandmother      Unknown/Adopted Maternal Grandfather      Unknown/Adopted Paternal Grandmother      Unknown/Adopted Paternal Grandfather      Substance Abuse Brother         alcoholism     Other Cancer Sister         throat cancer     Seizure Disorder Brother      Hypertension Sister        Social History:  Marital Status:   [5]  Social History     Tobacco Use     Smoking status: Former Smoker     Last attempt to quit: 1978     Years since quittin.5     Smokeless tobacco: Never Used     Tobacco comment: former smoker   Substance Use Topics     Alcohol use: No     Alcohol/week: 0.0 oz     Drug use: No        Medications:      omeprazole (PRILOSEC) 20 MG DR capsule   Ascorbic Acid (VITAMIN C PO)   atorvastatin (LIPITOR) 20 MG tablet   B Complex-C (VITAMIN B COMPLEX W/VITAMIN C) TABS tablet   calcium carbonate (OS-QUIANA 500 MG [place]. [place]) 1250 MG tablet   cinnamon 500 MG TABS   Ferrous Sulfate (IRON SUPPLEMENT PO)   LISINOPRIL PO   metoprolol tartrate (LOPRESSOR) 25 MG tablet   MULTIPLE VITAMIN PO   NAPROXEN PO   nitroGLYcerin (NITROSTAT) 0.4 MG sublingual tablet   omega 3 1000 MG CAPS   Probiotic Product (PROBIOTIC DAILY PO)   RANITIDINE HCL PO         Review of Systems   Constitutional: Negative for chills and fever.   HENT: Negative for congestion.    Eyes: Negative for redness.   Respiratory: Negative for cough, chest tightness, shortness of breath and wheezing.    Cardiovascular: Negative for chest pain, palpitations and leg swelling.   Gastrointestinal: Positive for abdominal pain and constipation. Negative for blood in stool, diarrhea, nausea and vomiting.   Endocrine: Negative for polydipsia and polyuria.   Genitourinary: Negative for dysuria and  hematuria.   Musculoskeletal: Negative for back pain.   Skin: Negative for rash.   Neurological: Negative for dizziness, weakness and light-headedness.   Psychiatric/Behavioral: Negative for confusion. The patient is not nervous/anxious.    All other systems reviewed and are negative.      Physical Exam   BP: (!) 139/103  Pulse: 76  Heart Rate: 94  Temp: 98  F (36.7  C)  Resp: 14  SpO2: 100 %      Physical Exam   Constitutional: He is oriented to person, place, and time. He appears well-developed and well-nourished. No distress.   HENT:   Head: Normocephalic and atraumatic.   Right Ear: External ear normal.   Left Ear: External ear normal.   Nose: Nose normal.   Mouth/Throat: Oropharynx is clear and moist.   Eyes: Conjunctivae and EOM are normal. Pupils are equal, round, and reactive to light.   Neck: Normal range of motion. Neck supple.   Cardiovascular: Normal rate, regular rhythm, normal heart sounds and intact distal pulses.   Pulmonary/Chest: Effort normal and breath sounds normal.   Abdominal: Soft. Bowel sounds are normal.   He localizes his discomfort to the epigastric left upper quadrant.  However his exam is completely benign with no discomfort or tenderness on deep palpation to all 4 quadrants.  There is no peritoneal signs of rebound or guarding.  Bowel sounds are active and normal.  No masses or visceromegaly appreciated   Musculoskeletal: Normal range of motion.   Neurological: He is alert and oriented to person, place, and time. He has normal strength. No cranial nerve deficit. GCS eye subscore is 4. GCS verbal subscore is 5. GCS motor subscore is 6.   Skin: Skin is warm and dry. Capillary refill takes less than 2 seconds.   Psychiatric: He has a normal mood and affect.   Nursing note and vitals reviewed.      ED Course              EKG Interpretation:      Interpreted by Kenneth Butler   Symptoms at time of EKG: Abdominal pain  Rhythm: Normal sinus   Rate: Normal  Axis: Normal  Ectopy:  None  Conduction: Left anterior fasciclar block  ST Segments/ T Waves: Non-specific ST-T wave changes and Poor R wave progression  Q Waves: None  Comparison to prior: Unchanged    Clinical Impression: Sinus with poor R wave progression and nonspecific ST changes which are unchanged from previous EKGs.  No acute ST-T changes.  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        Results for orders placed or performed during the hospital encounter of 03/29/19   CBC with platelets differential   Result Value Ref Range    WBC 7.9 4.0 - 11.0 10e9/L    RBC Count 4.71 4.4 - 5.9 10e12/L    Hemoglobin 14.8 13.3 - 17.7 g/dL    Hematocrit 42.5 40.0 - 53.0 %    MCV 90 78 - 100 fl    MCH 31.4 26.5 - 33.0 pg    MCHC 34.8 31.5 - 36.5 g/dL    RDW 12.8 10.0 - 15.0 %    Platelet Count 255 150 - 450 10e9/L    Diff Method Automated Method     % Neutrophils 59.6 %    % Lymphocytes 28.8 %    % Monocytes 8.3 %    % Eosinophils 2.4 %    % Basophils 0.6 %    % Immature Granulocytes 0.3 %    Nucleated RBCs 0 0 /100    Absolute Neutrophil 4.7 1.6 - 8.3 10e9/L    Absolute Lymphocytes 2.3 0.8 - 5.3 10e9/L    Absolute Monocytes 0.7 0.0 - 1.3 10e9/L    Absolute Basophils 0.1 0.0 - 0.2 10e9/L    Abs Immature Granulocytes 0.0 0 - 0.4 10e9/L    Absolute Nucleated RBC 0.0    Comprehensive metabolic panel   Result Value Ref Range    Sodium 142 133 - 144 mmol/L    Potassium 4.6 3.4 - 5.3 mmol/L    Chloride 106 94 - 109 mmol/L    Carbon Dioxide 26 20 - 32 mmol/L    Anion Gap 10 3 - 14 mmol/L    Glucose 93 70 - 99 mg/dL    Urea Nitrogen 19 7 - 30 mg/dL    Creatinine 0.94 0.66 - 1.25 mg/dL    GFR Estimate 82 >60 mL/min/[1.73_m2]    GFR Estimate If Black >90 >60 mL/min/[1.73_m2]    Calcium 8.5 8.5 - 10.1 mg/dL    Bilirubin Total 0.5 0.2 - 1.3 mg/dL    Albumin 3.8 3.4 - 5.0 g/dL    Protein Total 7.1 6.8 - 8.8 g/dL    Alkaline Phosphatase 114 40 - 150 U/L    ALT 36 0 - 70 U/L    AST 41 0 - 45 U/L   Troponin I   Result Value Ref Range    Troponin I ES <0.015 0.000 -  0.045 ug/L   D dimer quantitative   Result Value Ref Range    D Dimer 0.4 0.0 - 0.50 ug/ml FEU   CRP inflammation   Result Value Ref Range    CRP Inflammation <2.9 0.0 - 8.0 mg/L   Lipase   Result Value Ref Range    Lipase 127 73 - 393 U/L       Procedures               Critical Care time:  none               Results for orders placed or performed during the hospital encounter of 03/29/19 (from the past 24 hour(s))   CBC with platelets differential   Result Value Ref Range    WBC 7.9 4.0 - 11.0 10e9/L    RBC Count 4.71 4.4 - 5.9 10e12/L    Hemoglobin 14.8 13.3 - 17.7 g/dL    Hematocrit 42.5 40.0 - 53.0 %    MCV 90 78 - 100 fl    MCH 31.4 26.5 - 33.0 pg    MCHC 34.8 31.5 - 36.5 g/dL    RDW 12.8 10.0 - 15.0 %    Platelet Count 255 150 - 450 10e9/L    Diff Method Automated Method     % Neutrophils 59.6 %    % Lymphocytes 28.8 %    % Monocytes 8.3 %    % Eosinophils 2.4 %    % Basophils 0.6 %    % Immature Granulocytes 0.3 %    Nucleated RBCs 0 0 /100    Absolute Neutrophil 4.7 1.6 - 8.3 10e9/L    Absolute Lymphocytes 2.3 0.8 - 5.3 10e9/L    Absolute Monocytes 0.7 0.0 - 1.3 10e9/L    Absolute Basophils 0.1 0.0 - 0.2 10e9/L    Abs Immature Granulocytes 0.0 0 - 0.4 10e9/L    Absolute Nucleated RBC 0.0    Comprehensive metabolic panel   Result Value Ref Range    Sodium 142 133 - 144 mmol/L    Potassium 4.6 3.4 - 5.3 mmol/L    Chloride 106 94 - 109 mmol/L    Carbon Dioxide 26 20 - 32 mmol/L    Anion Gap 10 3 - 14 mmol/L    Glucose 93 70 - 99 mg/dL    Urea Nitrogen 19 7 - 30 mg/dL    Creatinine 0.94 0.66 - 1.25 mg/dL    GFR Estimate 82 >60 mL/min/[1.73_m2]    GFR Estimate If Black >90 >60 mL/min/[1.73_m2]    Calcium 8.5 8.5 - 10.1 mg/dL    Bilirubin Total 0.5 0.2 - 1.3 mg/dL    Albumin 3.8 3.4 - 5.0 g/dL    Protein Total 7.1 6.8 - 8.8 g/dL    Alkaline Phosphatase 114 40 - 150 U/L    ALT 36 0 - 70 U/L    AST 41 0 - 45 U/L   Troponin I   Result Value Ref Range    Troponin I ES <0.015 0.000 - 0.045 ug/L   D dimer quantitative    Result Value Ref Range    D Dimer 0.4 0.0 - 0.50 ug/ml FEU   CRP inflammation   Result Value Ref Range    CRP Inflammation <2.9 0.0 - 8.0 mg/L   Lipase   Result Value Ref Range    Lipase 127 73 - 393 U/L       Medications - No data to display    Assessments & Plan (with Medical Decision Making)   SHANNON--68-year-old male who presents the emergency room with ongoing epigastric and left upper quadrant discomfort and symptoms of reflux and GERD.  He takes ranitidine sporadically on an as-needed basis.  He was seen in January and it was advised that he be on a proton pump inhibitor and have an endoscopy to evaluate as he has a long history of GERD and similar symptoms.  He was ruled out with that visit with normal troponin and lab work.  He presents today with similar symptoms and again I repeated all his cardiac workup which is all normal.  His lipase liver enzymes are normal.  He is afebrile and has a normal white count.  He has no respiratory symptoms and has a normal d-dimer.  His CRP is normal.  This does not sound cardiac.  His cardiac workup is normal.  He has no symptoms with exertion and exercise.  He does have increased symptoms when he is stressed emotionally.  This sounds GI and again I reiterated my recommendation of following up with a endoscopy.  I also recommend he take omeprazole 20 mg twice daily for the next 2 weeks.  Patient needs to follow-up in the clinic and also follow up for an endoscopy.  Patient confirms and agrees.  Patient discharged in stable condition.  I have reviewed the nursing notes.    I have reviewed the findings, diagnosis, plan and need for follow up with the patient.          Medication List      Started    omeprazole 20 MG DR capsule  Commonly known as:  priLOSEC  20 mg, Oral, 2 TIMES DAILY            Final diagnoses:   Peptic ulcer disease   Gastroesophageal reflux disease, esophagitis presence not specified       3/29/2019   [facility] EMERGENCY DEPARTMENT     Carrie,  Kenneth SHEN MD  03/29/19 9861

## 2019-03-30 NOTE — ED NOTES
Pt disconnected from monitor to go to bathroom at this time, asked to collect a sample and will verify if one is needed

## 2019-04-19 ENCOUNTER — OFFICE VISIT (OUTPATIENT)
Dept: FAMILY MEDICINE | Facility: CLINIC | Age: 69
End: 2019-04-19
Payer: COMMERCIAL

## 2019-04-19 VITALS
OXYGEN SATURATION: 97 % | BODY MASS INDEX: 27.72 KG/M2 | HEIGHT: 71 IN | WEIGHT: 198 LBS | HEART RATE: 60 BPM | DIASTOLIC BLOOD PRESSURE: 80 MMHG | SYSTOLIC BLOOD PRESSURE: 132 MMHG | TEMPERATURE: 97 F | RESPIRATION RATE: 12 BRPM

## 2019-04-19 DIAGNOSIS — M48.07 SPINAL STENOSIS OF LUMBOSACRAL REGION: ICD-10-CM

## 2019-04-19 DIAGNOSIS — G62.9 NEUROPATHY: ICD-10-CM

## 2019-04-19 DIAGNOSIS — Z12.11 COLON CANCER SCREENING: ICD-10-CM

## 2019-04-19 DIAGNOSIS — M25.562 CHRONIC PAIN OF LEFT KNEE: ICD-10-CM

## 2019-04-19 DIAGNOSIS — M47.27 OSTEOARTHRITIS OF SPINE WITH RADICULOPATHY, LUMBOSACRAL REGION: ICD-10-CM

## 2019-04-19 DIAGNOSIS — G89.29 CHRONIC PAIN OF LEFT KNEE: ICD-10-CM

## 2019-04-19 DIAGNOSIS — K21.9 GASTROESOPHAGEAL REFLUX DISEASE WITHOUT ESOPHAGITIS: Primary | ICD-10-CM

## 2019-04-19 PROCEDURE — 99214 OFFICE O/P EST MOD 30 MIN: CPT | Performed by: FAMILY MEDICINE

## 2019-04-19 RX ORDER — GABAPENTIN 100 MG/1
CAPSULE ORAL
Qty: 60 CAPSULE | Refills: 0 | Status: SHIPPED | OUTPATIENT
Start: 2019-04-19

## 2019-04-19 ASSESSMENT — PAIN SCALES - GENERAL: PAINLEVEL: MILD PAIN (3)

## 2019-04-19 ASSESSMENT — MIFFLIN-ST. JEOR: SCORE: 1685.25

## 2019-04-19 NOTE — PROGRESS NOTES
SUBJECTIVE:   Dion López is a 68 year old male who presents to clinic today for the following   health issues:        ED/UC Followup:    Facility:  [facility]  Date of visit: 3/29/2019  Reason for visit: ulcer  Current Status: when on the medication, no problem with ulcer. Needs upper endoscopy     Would like X rays of Right hip, left knee, left foot    Dion is here today for couple concerns.  First is for ER follow-up.  He presented to ER for recurrence of upper left and epigastric abdominal pain about a month ago. Had similar symptom about 3 months earlier and did well with PPI.  He had a negative cardiac workup in the ER and was treated for GERD and reflux with omeprazole twice a day.  He has been taking the medication as prescribed with no side effect.  Stated the pain is gone and is feeling good - no concern about it.  No longer taking ranitidine.  No excessive burping or belching.  No melena or hematochezia.  No coffee-ground emesis.  Stated that he will not be able to get the colonoscopy done until the school is over. He drives school bus.    Also been having the lower back pain that radiated to left hip and leg.  Been there for a while but is getting worse.  Worse with walking, bending or prolonged standing.  Achy pain which becomes sharp with certain movement or above activities.  No weakness.  Also feel a lot of tingling on his left leg.  No leg swelling.  No fever or chills.  He had a lumbar MRI done recently which showed degenerative disease with spinal stenosis and foraminal stenosis.  He was recommended to see spine specialist for possible epidural injections or surgery but has not followed up with the recommendation.  Stated they been seeing a chiropractor for this and it has helped.  He has an appointment with a chiropractor later today.  No problem controlling bowel movement or urination.    Also had a left knee pain for the last couple of months.  It happened when he fell and landed on his  knee.  No swelling or redness.  It hurt when he apply pressure onto his lower part of the knee.  He thinks he may break the knee cap.    Also has the pain on his right foot.  Stated that couple months ago,he slammed his right great toe on an object and since then he has the pain on the top of his foot.  No redness or swelling.  More of an achy pain.  Never been evaluated for this.  No other concerns.      Additional history: as documented    Reviewed  and updated as needed this visit by clinical staff         Reviewed and updated as needed this visit by Provider         Current Outpatient Medications   Medication Sig Dispense Refill     Ascorbic Acid (VITAMIN C PO) Take 1,000 mg by mouth daily       atorvastatin (LIPITOR) 20 MG tablet Take 1 tablet (20 mg) by mouth daily 90 tablet 3     B Complex-C (VITAMIN B COMPLEX W/VITAMIN C) TABS tablet Take 1 tablet by mouth daily       calcium carbonate (OS-QUIANA 500 MG [place]. [place]) 1250 MG tablet Take 1 tablet by mouth daily       cinnamon 500 MG TABS        Ferrous Sulfate (IRON SUPPLEMENT PO) Take 325 mg by mouth daily       gabapentin (NEURONTIN) 100 MG capsule 1 tablet in am and 1-2 tablets at bedtime 60 capsule 0     metoprolol tartrate (LOPRESSOR) 25 MG tablet Take 0.5 tablets (12.5 mg) by mouth 2 times daily 90 tablet 3     MULTIPLE VITAMIN PO Take 1 tablet by mouth daily       NAPROXEN PO Take 250 mg by mouth 2 times daily (with meals)       omega 3 1000 MG CAPS Take 1 g by mouth daily 90 capsule      omeprazole (PRILOSEC) 20 MG DR capsule Take 1 capsule (20 mg) by mouth daily 90 capsule 3     Probiotic Product (PROBIOTIC DAILY PO) Take 1 tablet by mouth daily       nitroGLYcerin (NITROSTAT) 0.4 MG sublingual tablet For chest pain place 1 tablet under the tongue every 5 minutes for 3 doses. If symptoms persist 5 minutes after 1st dose call 911. (Patient not taking: Reported on 4/19/2019) 25 tablet 0     No Known Allergies    ROS:  Constitutional, HEENT, cardiovascular,  "pulmonary, gi and gu systems are negative, except as otherwise noted.    OBJECTIVE:     /80 (Cuff Size: Adult Regular)   Pulse 60   Temp 97  F (36.1  C) (Temporal)   Resp 12   Ht 1.803 m (5' 11\")   Wt 89.8 kg (198 lb)   SpO2 97%   BMI 27.62 kg/m    Body mass index is 27.62 kg/m .  GENERAL: healthy, alert and no distress  RESP: lungs clear to auscultation - no rales, rhonchi or wheezes  CV: regular rate and rhythm, no murmur, no peripheral edema and peripheral pulses strong  ABDOMEN: soft, nontender, no palpable masses organomegaly with normal bowel sounds.  No tender with palpation to the epigastric area.  MS: no gross musculoskeletal defects noted, no edema.  Walk without limping.  Both legs are equally in strength.  Slightly tender with palpation to the right foot medially with no swelling or redness.  Ankle exam was normal.  Knees with no effusion.  Both knees with negative/anterior/posterior drawer test.  Left knee with slight tenderness palpation to the knee joint inferiorly.  Both hip exam was normal.  No pain with internal or external rotations of the hip.  Tender with palpation to lumbar sacral spine and its paraspinous muscle.  Neuro: No focal neurological deficit.  DTRs +2 throughout.  SKIN: no suspicious lesions or rashes.    Diagnostic Test Results:  Results for orders placed or performed during the hospital encounter of 03/29/19   CBC with platelets differential   Result Value Ref Range    WBC 7.9 4.0 - 11.0 10e9/L    RBC Count 4.71 4.4 - 5.9 10e12/L    Hemoglobin 14.8 13.3 - 17.7 g/dL    Hematocrit 42.5 40.0 - 53.0 %    MCV 90 78 - 100 fl    MCH 31.4 26.5 - 33.0 pg    MCHC 34.8 31.5 - 36.5 g/dL    RDW 12.8 10.0 - 15.0 %    Platelet Count 255 150 - 450 10e9/L    Diff Method Automated Method     % Neutrophils 59.6 %    % Lymphocytes 28.8 %    % Monocytes 8.3 %    % Eosinophils 2.4 %    % Basophils 0.6 %    % Immature Granulocytes 0.3 %    Nucleated RBCs 0 0 /100    Absolute Neutrophil 4.7 " 1.6 - 8.3 10e9/L    Absolute Lymphocytes 2.3 0.8 - 5.3 10e9/L    Absolute Monocytes 0.7 0.0 - 1.3 10e9/L    Absolute Basophils 0.1 0.0 - 0.2 10e9/L    Abs Immature Granulocytes 0.0 0 - 0.4 10e9/L    Absolute Nucleated RBC 0.0    Comprehensive metabolic panel   Result Value Ref Range    Sodium 142 133 - 144 mmol/L    Potassium 4.6 3.4 - 5.3 mmol/L    Chloride 106 94 - 109 mmol/L    Carbon Dioxide 26 20 - 32 mmol/L    Anion Gap 10 3 - 14 mmol/L    Glucose 93 70 - 99 mg/dL    Urea Nitrogen 19 7 - 30 mg/dL    Creatinine 0.94 0.66 - 1.25 mg/dL    GFR Estimate 82 >60 mL/min/[1.73_m2]    GFR Estimate If Black >90 >60 mL/min/[1.73_m2]    Calcium 8.5 8.5 - 10.1 mg/dL    Bilirubin Total 0.5 0.2 - 1.3 mg/dL    Albumin 3.8 3.4 - 5.0 g/dL    Protein Total 7.1 6.8 - 8.8 g/dL    Alkaline Phosphatase 114 40 - 150 U/L    ALT 36 0 - 70 U/L    AST 41 0 - 45 U/L   Troponin I   Result Value Ref Range    Troponin I ES <0.015 0.000 - 0.045 ug/L   D dimer quantitative   Result Value Ref Range    D Dimer 0.4 0.0 - 0.50 ug/ml FEU   CRP inflammation   Result Value Ref Range    CRP Inflammation <2.9 0.0 - 8.0 mg/L   Lipase   Result Value Ref Range    Lipase 127 73 - 393 U/L       Exam Information     Exam Date Exam Time Accession # Performing Department Results    7/13/18  9:03 AM [id number] [facility] MRI    PACS Images      Show images for MRI Lumbar spine w/o contrast   Study Result     MRI LUMBAR SPINE WITHOUT CONTRAST   7/13/2018 9:03 AM      HISTORY: Chronic bilateral low back pain, numbness in the left  leg/foot, bilateral sciatica.     TECHNIQUE: Multiplanar multisequence MRI of the lumbar spine without  contrast.     COMPARISON: 4/14/2016 MRI. Radiographs 6/5/2018.     FINDINGS: Previous radiographs show five lumbar-type vertebral bodies.   The distal spinal cord and cauda equina appear normal.     T12-L1:   Normal disc, facet joints, spinal canal and neural foramina.        L1-L2:  The left posterolateral inferiorly  extruded disc herniation  seen previously has resolved. Moderate degenerative disc disease  remains with loss of disc height, circumferential disc bulge and  vertebral endplate osteophytes causing mild spinal canal stenosis.  Normal neural foramina and facet joints.     L2-L3:  Severe degenerative disc disease causing mild spinal canal  stenosis and mild bilateral foraminal stenosis. Normal facet joints.     L3-L4:  Severe degenerative disc disease. Severe spinal canal stenosis  from bulging disc, osteophytes and hypertrophied ligamentum flavum,  unchanged. Mild left foraminal stenosis and moderate foraminal  stenosis from bulging disc.     L4-L5:  Moderate degenerative disc disease, moderate spinal canal  stenosis, worse on the left than on the right, slightly decreased  since the previous exam. Bilateral moderate to severe foraminal  stenosis. Mild bilateral degenerative facet arthropathy.     L5-S1:  Mild circumferential disc bulge. Moderate right foraminal  stenosis and severe left foraminal stenosis. Normal spinal canal and  facet joints.     Paraspinous soft tissues:   Normal.       Bone marrow:   Normal.                                                                       IMPRESSION:    1. Interval resolution of L1-L2 inferiorly extruded disc herniation.  2. Multilevel degenerative disc disease and degenerative facet  arthropathy as described above.  3. Severe spinal canal stenosis at L3-L4, moderate spinal canal  stenosis L4-L5, and mild spinal canal stenosis L1-L2 and L2-L3.  Foraminal stenoses as described above. Slight improvement of spinal  canal stenosis at L4-L5 since previous exam.     DEXTER CEE MD         ASSESSMENT/PLAN:     1. Gastroesophageal reflux disease without esophagitis  Chronic, stable and is now controlled.  Discussed with him about nature of condition.  Emphasize on diet modification especially with avoiding late meals, fatty, greasy and spicy food.  Avoid alcohol or excessive  NSAID intake.  Continue with omeprazole 20 mg but switch to daily instead of bid. Encouraged to call in if the symptoms persists or worse.  He has the symptom for about 6 months and it is now resolved with an PPI.  The ER doctor mentioned about the upper endoscopy.  Discussed with him about it but he wanted to hold it off for now which I think appropriate.  If the symptoms get worse, will consider upper endoscopy.  All of his questions were answered.     - omeprazole (PRILOSEC) 20 MG DR capsule; Take 1 capsule (20 mg) by mouth daily  Dispense: 90 capsule; Refill: 3    2. Osteoarthritis of spine with radiculopathy, lumbosacral region  Been having low back pain with radiculopathy to the right leg.  Recent lumbar spine MRI showed spinal and foraminal stenosis.  No focal neurological deficit on the physical exam today.  Discussed with him about the nature of the condition.  Recommended referral to spine specialist for possible injection or and/or surgery.  He is again declined to offer.  In the meantime continue his normal activities as tolerated but avoid activities that would make the symptoms worse.  He was educated about symptoms to be seen or call in immediately especially if develops fever, bowel or urinary incontinence, focal weakness/neurological deficit or if has any concerns.  He plans to follow-up with a chiropractor for this.  Started gabapentin for neuropathic pain.  Side effect discussed.    - gabapentin (NEURONTIN) 100 MG capsule; 1 tablet in am and 1-2 tablets at bedtime  Dispense: 60 capsule; Refill: 0    3. Spinal stenosis of lumbosacral region  Please see #2 above for further details.  - gabapentin (NEURONTIN) 100 MG capsule; 1 tablet in am and 1-2 tablets at bedtime  Dispense: 60 capsule; Refill: 0    4. Chronic pain of left knee  Has this pain for couple months.  Exam was pretty normal.  Discussed about x-ray.  He is planning not to do anything for now.  In this case, will wait on the x-ray.  Normal  activities as tolerated.  Tylenol or Motrin as needed for pain.    5. Neuropathy  With foot pain that he has been having in the last couple months after he slammed his great right toe on an object.  Most likely arthritis.  Good shoe support discussed.  Again, discussed him about x-ray option but he is planning not doing anything for it and therefore will hold off on the x-ray for now.  Call in or follow-up if it persists or get worse.  Normal activities as tolerated.    6. Colon cancer screening    - Fecal colorectal cancer screen (FIT); Future    Sharri Maya Mai, MD  [facility]

## 2019-04-20 PROBLEM — Z12.11 COLON CANCER SCREENING: Status: ACTIVE | Noted: 2019-04-20

## 2019-04-20 PROBLEM — M48.07 SPINAL STENOSIS OF LUMBOSACRAL REGION: Status: ACTIVE | Noted: 2019-04-20

## 2019-04-20 PROBLEM — M47.27 OSTEOARTHRITIS OF SPINE WITH RADICULOPATHY, LUMBOSACRAL REGION: Status: ACTIVE | Noted: 2019-04-20

## 2019-04-20 PROBLEM — K21.9 GASTROESOPHAGEAL REFLUX DISEASE WITHOUT ESOPHAGITIS: Status: ACTIVE | Noted: 2019-04-20

## 2019-04-22 ENCOUNTER — TELEPHONE (OUTPATIENT)
Dept: FAMILY MEDICINE | Facility: CLINIC | Age: 69
End: 2019-04-22

## 2019-04-22 NOTE — TELEPHONE ENCOUNTER
Sharri Bonner MD [facility]             Please help him to set up apt for physical this summer

## 2019-04-24 ENCOUNTER — TRANSFERRED RECORDS (OUTPATIENT)
Dept: HEALTH INFORMATION MANAGEMENT | Facility: CLINIC | Age: 69
End: 2019-04-24

## 2019-04-26 PROCEDURE — 82274 ASSAY TEST FOR BLOOD FECAL: CPT | Performed by: FAMILY MEDICINE

## 2019-05-04 DIAGNOSIS — Z12.11 COLON CANCER SCREENING: ICD-10-CM

## 2019-05-04 LAB — HEMOCCULT STL QL IA: NEGATIVE

## 2019-05-06 ENCOUNTER — TELEPHONE (OUTPATIENT)
Dept: FAMILY MEDICINE | Facility: CLINIC | Age: 69
End: 2019-05-06

## 2019-05-06 NOTE — TELEPHONE ENCOUNTER
----- Message from Sharri Maya Mai, MD sent at 5/6/2019  6:53 AM CDT -----  Please let patient know that his screening test for blood in the stool was negative.  Repeat in a year.

## 2019-05-06 NOTE — LETTER
[address] [address]  [phone]        May 6, 2019    Dion López  [address] [place] [address]          Dear Dion,    LAB RESULTS:     The results of your recent Fit test were NORMAL Repeat in 1 year .  If you have any further questions or problems, please contact our office at [phone].        Sincerely,        Sharri Bonner M.D.

## 2019-05-06 NOTE — TELEPHONE ENCOUNTER
Tried to reach patient, left message for patient to call the clinic back.    Wendy Bourgeois, [facility]

## 2019-05-21 ENCOUNTER — TELEPHONE (OUTPATIENT)
Dept: FAMILY MEDICINE | Facility: CLINIC | Age: 69
End: 2019-05-21

## 2019-05-21 NOTE — TELEPHONE ENCOUNTER
RN TRIAGE CALL:    Patient Contact    Attempt # 1    Was call answered?  No.  Unable to leave message.    Sondra Jade RN

## 2019-05-21 NOTE — TELEPHONE ENCOUNTER
Reason for call:  Patient reporting a symptom    Symptom or request: horse voice, congestion    Duration (how long have symptoms been present): since last wed    Have you been treated for this before? No    Additional comments: pt states he woke up with this last wed.  He declined appt.  Wants to talk to a nurse in the next 15-20min to see what kind of OTC meds he can take to help with the sx.  Please advise.      Phone Number patient can be reached at:  Home number on file [phone] (home)    Best Time:  any    Can we leave a detailed message on this number:  YES    Call taken on 5/21/2019 at 11:35 AM by Zuleyma Springer

## 2019-05-22 NOTE — TELEPHONE ENCOUNTER
Patient calling back- attempted to reach out to call back line and got transferred over to [facility] scheduling. Patient ended up hanging up before I could get a hold of anyone. Please call back when time permits.

## 2019-05-22 NOTE — TELEPHONE ENCOUNTER
Patient called back and would like a call about 1030 tomorrow morning, he is too busy today to speak with anyone.

## 2019-05-23 NOTE — PROGRESS NOTES
"Subjective     Dion López is a 68 year old male who presents to clinic today for the following health issues:    Healthy Habits:     In general, how would you rate your overall health?  Good    Frequency of exercise:  None    Do you usually eat at least 4 servings of fruit and vegetables a day, include whole grains    & fiber and avoid regularly eating high fat or \"junk\" foods?  No    Taking medications regularly:  Yes    Medication side effects:  Not applicable    Ability to successfully perform activities of daily living:  No assistance needed    Home Safety:  No safety concerns identified    Hearing Impairment:  No hearing concerns    In the past 6 months, have you been bothered by leaking of urine?  No    In general, how would you rate your overall mental or emotional health?  Good      PHQ-2 Total Score: 1    Additional concerns today:  No     Acute Illness   Acute illness concerns:  URI- cough  Onset: 9 days    Fever: no     Chills/Sweats: YES    Headache (location?): no     Sinus Pressure:no    Conjunctivitis:  no    Ear Pain: no    Rhinorrhea: no     Congestion: YES    Sore Throat: no      Cough: YES-productive of yellow sputum, productive of green sputum    Wheeze: YES    Decreased Appetite: no     Nausea: no     Vomiting: no     Diarrhea:  no     Dysuria/Freq.: no     Fatigue/Achiness: YES    Sick/Strep Exposure: no      Therapies Tried and outcome:  Not taking anything -     Worsening hoarse voice the last couple days   No hx of asthma, reactive airway, neb treatments, inhaler use.  No history of recent hospitalizations or pneumonia.   Denies alleriges   Denies SOB or chest pain     Current Outpatient Medications   Medication Sig Dispense Refill     Ascorbic Acid (VITAMIN C PO) Take 1,000 mg by mouth daily       atorvastatin (LIPITOR) 20 MG tablet Take 1 tablet (20 mg) by mouth daily 90 tablet 3     azithromycin (ZITHROMAX) 250 MG tablet Take 2 tablets (500 mg) by mouth daily for 1 day, THEN 1 tablet " (250 mg) daily for 4 days. 6 tablet 0     B Complex-C (VITAMIN B COMPLEX W/VITAMIN C) TABS tablet Take 1 tablet by mouth daily       cinnamon 500 MG TABS        Ferrous Sulfate (IRON SUPPLEMENT PO) Take 325 mg by mouth daily       guaiFENesin (MUCINEX) 600 MG 12 hr tablet Take 2 tablets (1,200 mg) by mouth 2 times daily 20 tablet 0     metoprolol tartrate (LOPRESSOR) 25 MG tablet Take 0.5 tablets (12.5 mg) by mouth 2 times daily 90 tablet 3     MULTIPLE VITAMIN PO Take 1 tablet by mouth daily       NAPROXEN PO Take 250 mg by mouth 2 times daily (with meals)       omega 3 1000 MG CAPS Take 1 g by mouth daily 90 capsule      omeprazole (PRILOSEC) 20 MG DR capsule Take 1 capsule (20 mg) by mouth daily 90 capsule 3     Probiotic Product (PROBIOTIC DAILY PO) Take 1 tablet by mouth daily       calcium carbonate (OS-QUIANA 500 MG [place]. [place]) 1250 MG tablet Take 1 tablet by mouth daily       gabapentin (NEURONTIN) 100 MG capsule 1 tablet in am and 1-2 tablets at bedtime (Patient not taking: Reported on 5/24/2019) 60 capsule 0     nitroGLYcerin (NITROSTAT) 0.4 MG sublingual tablet For chest pain place 1 tablet under the tongue every 5 minutes for 3 doses. If symptoms persist 5 minutes after 1st dose call 911. (Patient not taking: Reported on 4/19/2019) 25 tablet 0       Reviewed and updated as needed this visit by Provider         Review of Systems   Constitutional: Negative for chills and fever.   HENT: Positive for congestion and sore throat. Negative for ear pain and hearing loss.    Eyes: Negative for pain and visual disturbance.   Respiratory: Positive for cough. Negative for shortness of breath.    Cardiovascular: Positive for peripheral edema. Negative for chest pain and palpitations.   Gastrointestinal: Positive for constipation. Negative for abdominal pain, diarrhea, heartburn, hematochezia and nausea.   Genitourinary: Negative for discharge, dysuria, frequency, genital sores, hematuria, impotence and urgency.  "  Musculoskeletal: Positive for arthralgias, joint swelling and myalgias.   Skin: Positive for rash.   Neurological: Negative for dizziness, weakness, headaches and paresthesias.   Psychiatric/Behavioral: Negative for mood changes. The patient is not nervous/anxious.             Objective    /84   Pulse 50   Temp 97.6  F (36.4  C)   Ht 1.803 m (5' 11\")   Wt 90.7 kg (200 lb)   SpO2 98%   BMI 27.89 kg/m    Body mass index is 27.89 kg/m .  Physical Exam       Diagnostic Test Results:  Labs reviewed in Epic  none         Assessment & Plan     1. Acute bronchitis with symptoms > 10 days  - Start Azithromycin and complete entire regimen  - recommend Mucinex, without D component as this can elevated blood pressure.   - Increase fluids to help thin mucous  - Salt water gargles to help soothe throat. - azithromycin (ZITHROMAX) 250 MG tablet; Take 2 tablets (500 mg) by mouth daily for 1 day, THEN 1 tablet (250 mg) daily for 4 days.  Dispense: 6 tablet; Refill: 0  - guaiFENesin (MUCINEX) 600 MG 12 hr tablet; Take 2 tablets (1,200 mg) by mouth 2 times daily  Dispense: 20 tablet; Refill: 0    2. Essential hypertension  - Slightly up today due to acute illness. Monitor and return to clinic if consistently above 140/90  BP Readings from Last 3 Encounters:   05/24/19 142/84   04/19/19 132/80   03/29/19 128/72         3. Skin lesion  - Skin lesion along left eyelid and along the right temporal region, recommend follow up with dermatology given location of left eyelid lesion. May need dermatologist removal or monitoring.   - DERMATOLOGY REFERRAL         The patient indicates understanding of these issues and agrees with the plan.      Return in about 3 days (around 5/27/2019) for If not improved.    DONNA Bustamante [facility]"

## 2019-05-24 ENCOUNTER — OFFICE VISIT (OUTPATIENT)
Dept: FAMILY MEDICINE | Facility: OTHER | Age: 69
End: 2019-05-24
Payer: COMMERCIAL

## 2019-05-24 VITALS
WEIGHT: 200 LBS | BODY MASS INDEX: 28 KG/M2 | HEART RATE: 50 BPM | DIASTOLIC BLOOD PRESSURE: 84 MMHG | SYSTOLIC BLOOD PRESSURE: 142 MMHG | TEMPERATURE: 97.6 F | HEIGHT: 71 IN | OXYGEN SATURATION: 98 %

## 2019-05-24 DIAGNOSIS — I10 ESSENTIAL HYPERTENSION: ICD-10-CM

## 2019-05-24 DIAGNOSIS — L98.9 SKIN LESION: ICD-10-CM

## 2019-05-24 DIAGNOSIS — J20.9 ACUTE BRONCHITIS WITH SYMPTOMS > 10 DAYS: Primary | ICD-10-CM

## 2019-05-24 PROCEDURE — 99214 OFFICE O/P EST MOD 30 MIN: CPT | Performed by: NURSE PRACTITIONER

## 2019-05-24 RX ORDER — GUAIFENESIN 600 MG/1
1200 TABLET, EXTENDED RELEASE ORAL 2 TIMES DAILY
Qty: 20 TABLET | Refills: 0 | Status: SHIPPED | OUTPATIENT
Start: 2019-05-24

## 2019-05-24 RX ORDER — AZITHROMYCIN 250 MG/1
TABLET, FILM COATED ORAL
Qty: 6 TABLET | Refills: 0 | Status: SHIPPED | OUTPATIENT
Start: 2019-05-24 | End: 2019-05-29

## 2019-05-24 ASSESSMENT — PATIENT HEALTH QUESTIONNAIRE - PHQ9
10. IF YOU CHECKED OFF ANY PROBLEMS, HOW DIFFICULT HAVE THESE PROBLEMS MADE IT FOR YOU TO DO YOUR WORK, TAKE CARE OF THINGS AT HOME, OR GET ALONG WITH OTHER PEOPLE: NOT DIFFICULT AT ALL
SUM OF ALL RESPONSES TO PHQ QUESTIONS 1-9: 4
SUM OF ALL RESPONSES TO PHQ QUESTIONS 1-9: 4

## 2019-05-24 ASSESSMENT — ENCOUNTER SYMPTOMS
DIZZINESS: 0
COUGH: 1
ARTHRALGIAS: 1
DIARRHEA: 0
HEMATURIA: 0
ABDOMINAL PAIN: 0
JOINT SWELLING: 1
PARESTHESIAS: 0
WEAKNESS: 0
CHILLS: 0
NAUSEA: 0
FREQUENCY: 0
SHORTNESS OF BREATH: 0
SORE THROAT: 1
HEARTBURN: 0
DYSURIA: 0
MYALGIAS: 1
FEVER: 0
PALPITATIONS: 0
CONSTIPATION: 1
HEMATOCHEZIA: 0
HEADACHES: 0
EYE PAIN: 0
NERVOUS/ANXIOUS: 0

## 2019-05-24 ASSESSMENT — ANXIETY QUESTIONNAIRES
GAD7 TOTAL SCORE: 0
1. FEELING NERVOUS, ANXIOUS, OR ON EDGE: NOT AT ALL
GAD7 TOTAL SCORE: 0
7. FEELING AFRAID AS IF SOMETHING AWFUL MIGHT HAPPEN: NOT AT ALL
3. WORRYING TOO MUCH ABOUT DIFFERENT THINGS: NOT AT ALL
6. BECOMING EASILY ANNOYED OR IRRITABLE: NOT AT ALL
5. BEING SO RESTLESS THAT IT IS HARD TO SIT STILL: NOT AT ALL
7. FEELING AFRAID AS IF SOMETHING AWFUL MIGHT HAPPEN: NOT AT ALL
GAD7 TOTAL SCORE: 0
2. NOT BEING ABLE TO STOP OR CONTROL WORRYING: NOT AT ALL
4. TROUBLE RELAXING: NOT AT ALL

## 2019-05-24 ASSESSMENT — PAIN SCALES - GENERAL: PAINLEVEL: MODERATE PAIN (4)

## 2019-05-24 ASSESSMENT — ACTIVITIES OF DAILY LIVING (ADL): CURRENT_FUNCTION: NO ASSISTANCE NEEDED

## 2019-05-24 ASSESSMENT — MIFFLIN-ST. JEOR: SCORE: 1694.32

## 2019-05-24 NOTE — PATIENT INSTRUCTIONS
- Start Azithromycin and complete entire regimen  - recommend Mucinex, without D component  - Increase fluids to help thin mucous  - Salt water gargles to help soothe throat.   -Follow up with dermatology, I have attached some options.     DONNA Bustamante CNP

## 2019-05-25 ASSESSMENT — ANXIETY QUESTIONNAIRES: GAD7 TOTAL SCORE: 0

## 2019-05-25 ASSESSMENT — PATIENT HEALTH QUESTIONNAIRE - PHQ9: SUM OF ALL RESPONSES TO PHQ QUESTIONS 1-9: 4

## 2019-05-28 NOTE — TELEPHONE ENCOUNTER
Pt was seen on 5/24/19 and Dx with Bronchitis. Will close this encounter from 5/22/19.............................SARI York

## 2019-06-03 DIAGNOSIS — R94.39 ABNORMAL CARDIOVASCULAR STRESS TEST: ICD-10-CM

## 2019-06-03 NOTE — TELEPHONE ENCOUNTER
Patient contacted and notified that he needs to contact his primary pharmacy in order to transfer prescriptions to [facility].   SHAMAR Erazo RN, BSN.

## 2019-06-04 ENCOUNTER — TELEPHONE (OUTPATIENT)
Dept: CARDIOLOGY | Facility: CLINIC | Age: 69
End: 2019-06-04

## 2019-06-04 ENCOUNTER — TELEPHONE (OUTPATIENT)
Dept: FAMILY MEDICINE | Facility: CLINIC | Age: 69
End: 2019-06-04

## 2019-06-04 DIAGNOSIS — R94.39 ABNORMAL CARDIOVASCULAR STRESS TEST: ICD-10-CM

## 2019-06-04 DIAGNOSIS — I10 HTN (HYPERTENSION): ICD-10-CM

## 2019-06-04 NOTE — TELEPHONE ENCOUNTER
Called PCP office and requested that they refill Atorvastatin and Metoprolol under PCP and not Dr. Hernandez since he didn't order any future follow up for the patient.  Patient wants to switch refills to [facility] pharmacy now.  Also, most recent FLP was 12/17.  Message will be sent to PCP refill nurse.  Zackery GUO

## 2019-06-04 NOTE — TELEPHONE ENCOUNTER
Reason for Call:  Other prescription    Detailed comments: Dr Hernandez office calling to state Dion needs to have his refills/meds managed by PCP again.      Dion just requested atorvastatin (LIPITOR) 20 MG tablet and metoprolol tartrate (LOPRESSOR) 25 MG tablet from their office and these will need to be ordered from Dr Bonner.    Phone Number Patient can be reached at: [phone]    Best Time: any    Can we leave a detailed message on this number? YES    Call taken on 6/4/2019 at 3:20 PM by Naa Friedman

## 2019-06-05 DIAGNOSIS — K21.9 GASTROESOPHAGEAL REFLUX DISEASE WITHOUT ESOPHAGITIS: ICD-10-CM

## 2019-06-05 RX ORDER — METOPROLOL TARTRATE 25 MG/1
12.5 TABLET, FILM COATED ORAL 2 TIMES DAILY
Qty: 90 TABLET | Refills: 3 | OUTPATIENT
Start: 2019-06-05

## 2019-06-05 RX ORDER — ATORVASTATIN CALCIUM 20 MG/1
20 TABLET, FILM COATED ORAL DAILY
Qty: 90 TABLET | Refills: 3 | OUTPATIENT
Start: 2019-06-05

## 2019-06-05 NOTE — TELEPHONE ENCOUNTER
"Patient is switching pharmacies.     Omeprazole  Last Written Prescription Date:  04/19/2019  Last Fill Quantity: 90,  # refills: 3   Last office visit: 5/24/2019 with prescribing provider:  Angela   Future Office Visit:   Next 5 appointments (look out 90 days)    Jun 21, 2019 11:00 AM CDT  [facility], [facility] [address] [place] [id number] [phone]      Prescription approved per [facility] Refill Protocol.    Requested Prescriptions   Pending Prescriptions Disp Refills     omeprazole (PRILOSEC) 20 MG DR capsule 90 capsule 3     Sig: Take 1 capsule (20 mg) by mouth daily       PPI Protocol Passed - 6/5/2019 10:32 AM        Passed - Not on Clopidogrel (unless Pantoprazole ordered)        Passed - No diagnosis of osteoporosis on record        Passed - Recent (12 mo) or future (30 days) visit within the authorizing provider's specialty     Patient had office visit in the last 12 months or has a visit in the next 30 days with authorizing provider or within the authorizing provider's specialty.  See \"Patient Info\" tab in inbasket, or \"Choose Columns\" in Meds & Orders section of the refill encounter.          Passed - Medication is active on med list        Passed - Patient is age 18 or older      Feli Bowen RN   "
Patient is changing pharmacies.  omeprazole  Last Written Prescription Date:  04/19/19  Last Fill Quantity: 90,  # refills: 3   Last office visit: 5/24/2019 with prescribing provider:     Future Office Visit:   Next 5 appointments (look out 90 days)    Jun 21, 2019 11:00 AM CDT  PHYSICAL with Sharri Maya Mai, MD  [facility] [address] [place] [id number] [phone]           
No complaints

## 2019-06-20 ENCOUNTER — ANCILLARY PROCEDURE (OUTPATIENT)
Dept: GENERAL RADIOLOGY | Facility: OTHER | Age: 69
End: 2019-06-20
Attending: PHYSICAL MEDICINE & REHABILITATION
Payer: COMMERCIAL

## 2019-06-20 ENCOUNTER — OFFICE VISIT (OUTPATIENT)
Dept: ORTHOPEDICS | Facility: OTHER | Age: 69
End: 2019-06-20
Payer: COMMERCIAL

## 2019-06-20 VITALS
WEIGHT: 200 LBS | DIASTOLIC BLOOD PRESSURE: 72 MMHG | SYSTOLIC BLOOD PRESSURE: 108 MMHG | BODY MASS INDEX: 28 KG/M2 | HEIGHT: 71 IN

## 2019-06-20 DIAGNOSIS — G89.29 CHRONIC BILATERAL LOW BACK PAIN WITH BILATERAL SCIATICA: Primary | ICD-10-CM

## 2019-06-20 DIAGNOSIS — M25.551 HIP PAIN, RIGHT: ICD-10-CM

## 2019-06-20 DIAGNOSIS — M79.672 FOOT PAIN, LEFT: ICD-10-CM

## 2019-06-20 DIAGNOSIS — M51.369 LUMBAR DEGENERATIVE DISC DISEASE: ICD-10-CM

## 2019-06-20 DIAGNOSIS — M54.42 CHRONIC BILATERAL LOW BACK PAIN WITH BILATERAL SCIATICA: Primary | ICD-10-CM

## 2019-06-20 DIAGNOSIS — M54.41 CHRONIC BILATERAL LOW BACK PAIN WITH BILATERAL SCIATICA: Primary | ICD-10-CM

## 2019-06-20 PROCEDURE — 73522 X-RAY EXAM HIPS BI 3-4 VIEWS: CPT

## 2019-06-20 PROCEDURE — 99213 OFFICE O/P EST LOW 20 MIN: CPT | Performed by: PHYSICAL MEDICINE & REHABILITATION

## 2019-06-20 PROCEDURE — 73630 X-RAY EXAM OF FOOT: CPT | Mod: LT

## 2019-06-20 ASSESSMENT — MIFFLIN-ST. JEOR: SCORE: 1694.32

## 2019-06-20 NOTE — LETTER
6/20/2019         RE: Dion López  [address] [place] [address]        Dear Colleague,    Thank you for referring your patient, Dion López, to the [facility]. Please see a copy of my visit note below.    Sports Medicine Clinic Visit - Interim History June 20, 2019    Initial Visit Date 6/5/18      PCP: Sharri Bonner    Dion López is a 68 year old male who is seen in follow up for his low back pain. Since last visit on 11/5/18 patient has noticed worsening anterior right hip and upper leg pain; as well as low back pain with radiating symptoms to his lateral leg all the way to his left foot.  Today he is concerned that his big toe could be broken due an injury he had a year and a half ago after jamming his toe on a steel pole. He rates the pain at a 6/10 currently. He reports going to the gym and working on core strength has helped him, but he has had to stop due to time constraints.   Symptoms are relieved with sitting.  Symptoms are worsened by standing and walking.  He is not interested in spine surgery.  Epidural steroid injection was not helpful.      Review of Systems  Musculoskeletal: as above  Remainder of review of systems is negative including constitutional, eyes, ENT, CV, pulmonary, GI, , endocrine, skin, hematologic, and neurologic except as noted in HPI or medical history.    History reviewed. No pertinent past surgical/medical/family/social history other than as mentioned in HPI.    Patient Active Problem List   Diagnosis     Counseling regarding advanced directives     Essential hypertension     Arthropathy of shoulder right     Osteoarthritis of spine with radiculopathy, lumbosacral region     Gastroesophageal reflux disease without esophagitis     Spinal stenosis of lumbosacral region     Past Medical History:   Diagnosis Date     Hypertension      NO ACTIVE PROBLEMS      Past Surgical History:   Procedure Laterality Date     AMPUTATION FINGER/THUMB       NO HISTORY  OF SURGERY       Family History   Problem Relation Age of Onset     Coronary Artery Disease Mother 55     Hypertension Mother      Other Cancer Father         lung cancer     Unknown/Adopted Maternal Grandmother      Unknown/Adopted Maternal Grandfather      Unknown/Adopted Paternal Grandmother      Unknown/Adopted Paternal Grandfather      Substance Abuse Brother         alcoholism     Other Cancer Sister         throat cancer     Seizure Disorder Brother      Hypertension Sister      Social History     Socioeconomic History     Marital status:      Spouse name: Not on file     Number of children: Not on file     Years of education: Not on file     Highest education level: Not on file   Occupational History     Not on file   Social Needs     Financial resource strain: Not on file     Food insecurity:     Worry: Not on file     Inability: Not on file     Transportation needs:     Medical: Not on file     Non-medical: Not on file   Tobacco Use     Smoking status: Former Smoker     Last attempt to quit: 1978     Years since quittin.7     Smokeless tobacco: Never Used     Tobacco comment: former smoker   Substance and Sexual Activity     Alcohol use: No     Alcohol/week: 0.0 oz     Drug use: No     Sexual activity: Not Currently     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   Lifestyle     Physical activity:     Days per week: Not on file     Minutes per session: Not on file     Stress: Not on file   Relationships     Social connections:     Talks on phone: Not on file     Gets together: Not on file     Attends Oriental orthodox service: Not on file     Active member of club or organization: Not on file     Attends meetings of clubs or organizations: Not on file     Relationship status: Not on file     Intimate partner violence:     Fear of current or ex partner: Not on file     Emotionally abused: Not on file     Physically abused: Not on file     Forced sexual activity: Not on file   Other Topics  "Concern     Parent/sibling w/ CABG, MI or angioplasty before 65F 55M? Not Asked   Social History Narrative     Not on file       He works as a     Current Outpatient Medications   Medication     Ascorbic Acid (VITAMIN C PO)     atorvastatin (LIPITOR) 20 MG tablet     B Complex-C (VITAMIN B COMPLEX W/VITAMIN C) TABS tablet     calcium carbonate (OS-QUIANA 500 MG [place]. [place]) 1250 MG tablet     cinnamon 500 MG TABS     Ferrous Sulfate (IRON SUPPLEMENT PO)     guaiFENesin (MUCINEX) 600 MG 12 hr tablet     metoprolol tartrate (LOPRESSOR) 25 MG tablet     MULTIPLE VITAMIN PO     NAPROXEN PO     omega 3 1000 MG CAPS     omeprazole (PRILOSEC) 20 MG DR capsule     Probiotic Product (PROBIOTIC DAILY PO)     gabapentin (NEURONTIN) 100 MG capsule     nitroGLYcerin (NITROSTAT) 0.4 MG sublingual tablet     No current facility-administered medications for this visit.      No Known Allergies      Objective:  /72   Ht 1.803 m (5' 11\")   Wt 90.7 kg (200 lb)   BMI 27.89 kg/m       General: Alert and in no distress    Head: Normocephalic, atraumatic  Eyes: no scleral icterus or conjunctival erythema   Oropharynx:  Mucous membranes moist  Skin: no erythema, petechiae, or jaundice  CV: regular rhythm by palpation, 2+ distal pulses  Resp: normal respiratory effort without conversational dyspnea   Psych: normal mood and affect    Gait: Non-antalgic, appropriate coordination and balance   Neuro: Motor strength and sensation as noted below    Musculoskeletal:  -Foot:  No erythema or ecchymosis.  Tender over the medial dorsal foot.      Hip and Low back exam:    Inspection:     no visible deformity in the low back       normal skin       normal vascular       normal lymphatic       no asymmetry    Palpation:  Tender over the lumbar spine    Lumbar ROM: Full lumbar flexion, extension, rotation, and lateral flexion.  Reports stiffness with forward flexion.  Extension and right lateral flexion cause pain.      Hip ROM:  " Right hip ROM causes anterior right hip pain.      Strength:  5/5 hip flexion/abduction/adduction, knee flexion/extension, ankle dorsiflexion/plantarflexion, great toe extension, toe flexion    Sensation: Altered sensation to light touch over the left lateral leg and dorsal foot/toes.    Radiology:  X-rays ordered and independent visualization of images performed.  No acute osseous abnormality seen.   Full radiology report to follow.      MRI LUMBAR SPINE WITHOUT CONTRAST   7/13/2018 9:03 AM      HISTORY: Chronic bilateral low back pain, numbness in the left  leg/foot, bilateral sciatica.     TECHNIQUE: Multiplanar multisequence MRI of the lumbar spine without  contrast.     COMPARISON: 4/14/2016 MRI. Radiographs 6/5/2018.     FINDINGS: Previous radiographs show five lumbar-type vertebral bodies.   The distal spinal cord and cauda equina appear normal.     T12-L1:   Normal disc, facet joints, spinal canal and neural foramina.     L1-L2:  The left posterolateral inferiorly extruded disc herniation  seen previously has resolved. Moderate degenerative disc disease  remains with loss of disc height, circumferential disc bulge and  vertebral endplate osteophytes causing mild spinal canal stenosis.  Normal neural foramina and facet joints.     L2-L3:  Severe degenerative disc disease causing mild spinal canal  stenosis and mild bilateral foraminal stenosis. Normal facet joints.     L3-L4:  Severe degenerative disc disease. Severe spinal canal stenosis  from bulging disc, osteophytes and hypertrophied ligamentum flavum,  unchanged. Mild left foraminal stenosis and moderate foraminal  stenosis from bulging disc.     L4-L5:  Moderate degenerative disc disease, moderate spinal canal  stenosis, worse on the left than on the right, slightly decreased  since the previous exam. Bilateral moderate to severe foraminal  stenosis. Mild bilateral degenerative facet arthropathy.     L5-S1:  Mild circumferential disc bulge. Moderate  right foraminal  stenosis and severe left foraminal stenosis. Normal spinal canal and  facet joints.     Paraspinous soft tissues:   Normal.       Bone marrow:   Normal.                                                                       IMPRESSION:    1. Interval resolution of L1-L2 inferiorly extruded disc herniation.  2. Multilevel degenerative disc disease and degenerative facet  arthropathy as described above.  3. Severe spinal canal stenosis at L3-L4, moderate spinal canal  stenosis L4-L5, and mild spinal canal stenosis L1-L2 and L2-L3.  Foraminal stenoses as described above. Slight improvement of spinal  canal stenosis at L4-L5 since previous exam.     DEXTER CEE MD    Assessment:  1. Chronic bilateral low back pain with bilateral sciatica    2. Lumbar degenerative disc disease    3. Hip pain, right    4. Foot pain, left        Plan:  Discussed the assessment with the patient and developed a plan together:  -Physical therapy ordered at the [facility].  Please do 5-6 days of exercises per week between formal sessions and the home exercises they provide.  Please call [phone] to schedule.    -Ice or heat 15-20 minutes as needed (Avoid sleeping on a heating pad or ice).  Recommend heat for 15-20 minutes before activity and ice for 15-20 minutes after activity.  -Patient's preferred over the counter medication as directed on packaging as needed for pain or soreness.    -Follow up as needed if symptoms fail to improve or worsen.  Please call with questions or concerns.        Fidelia Henderson MD, [facility] Sports Medicine  [facility]        Again, thank you for allowing me to participate in the care of your patient.        Sincerely,        Viviane Henderson MD

## 2019-06-20 NOTE — PATIENT INSTRUCTIONS
-Physical therapy ordered at the [facility].  Please do 5-6 days of exercises per week between formal sessions and the home exercises they provide.  Please call [phone] to schedule.    -Ice or heat 15-20 minutes as needed (Avoid sleeping on a heating pad or ice).  Recommend heat for 15-20 minutes before activity and ice for 15-20 minutes after activity.  -Patient's preferred over the counter medication as directed on packaging as needed for pain or soreness.    -Follow up as needed if symptoms fail to improve or worsen.  Please call with questions or concerns.

## 2019-06-24 ENCOUNTER — TELEPHONE (OUTPATIENT)
Dept: FAMILY MEDICINE | Facility: CLINIC | Age: 69
End: 2019-06-24

## 2019-06-24 NOTE — TELEPHONE ENCOUNTER
Patient called back. Emily did a health assessment on 5/8/19 and it was scanned in his chart on 5/30/19
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
## 2019-11-18 ENCOUNTER — TELEPHONE (OUTPATIENT)
Dept: CARDIOLOGY | Facility: CLINIC | Age: 69
End: 2019-11-18

## 2019-11-18 DIAGNOSIS — R94.39 ABNORMAL CARDIOVASCULAR STRESS TEST: ICD-10-CM

## 2019-11-18 DIAGNOSIS — I10 HTN (HYPERTENSION): ICD-10-CM

## 2019-11-18 RX ORDER — ATORVASTATIN CALCIUM 20 MG/1
20 TABLET, FILM COATED ORAL DAILY
Qty: 90 TABLET | Refills: 3 | Status: SHIPPED | OUTPATIENT
Start: 2019-11-18

## 2019-11-18 RX ORDER — METOPROLOL TARTRATE 25 MG/1
12.5 TABLET, FILM COATED ORAL 2 TIMES DAILY
Qty: 90 TABLET | Refills: 3 | Status: SHIPPED | OUTPATIENT
Start: 2019-11-18

## 2019-12-27 ENCOUNTER — TELEPHONE (OUTPATIENT)
Dept: FAMILY MEDICINE | Facility: CLINIC | Age: 69
End: 2019-12-27

## 2019-12-27 NOTE — TELEPHONE ENCOUNTER
Reason for call:  Patient reporting a symptom    Symptom or request: patient thinks he has a hernia    Duration (how long have symptoms been present): about 6 weeks    Have you been treated for this before? No    Additional comments: patient declined making a appt, he wants to know what Dr Bonner thinks he should do.     Phone Number patient can be reached at:  [phone]    Best Time:      Can we leave a detailed message on this number:  NO    Call taken on 12/27/2019 at 9:13 AM by Zuleyma Gonsalez

## 2019-12-27 NOTE — TELEPHONE ENCOUNTER
Called patient and relayed information.  NO further action is needed as of right now.    Marie Kaufman, CMA

## 2019-12-27 NOTE — TELEPHONE ENCOUNTER
Dion returns call and symptoms message is relayed.    Dion states he is not having any pain, but he can see lump on his right side and if he coughs he can feel a pulling sensation.    Dion also asks if there are any ramifications if he waits to do anything about it if it is a hernia.

## 2019-12-27 NOTE — TELEPHONE ENCOUNTER
Please let patient know that he does not have to be seen right away for the hernia unless it becomes painful or change in skin color at the hernia area.  Try to avoid activities that would make the symptoms worse.  If become more persistent painful or uncomfortable, then follow-up to discuss about treatment options.

## 2020-02-14 NOTE — PROGRESS NOTES
Sports Medicine Clinic Visit - Interim History June 20, 2019    Initial Visit Date 6/5/18      PCP: Sharri Bonner Rene is a 68 year old male who is seen in follow up for his low back pain. Since last visit on 11/5/18 patient has noticed worsening anterior right hip and upper leg pain; as well as low back pain with radiating symptoms to his lateral leg all the way to his left foot.  Today he is concerned that his big toe could be broken due an injury he had a year and a half ago after jamming his toe on a steel pole. He rates the pain at a 6/10 currently. He reports going to the gym and working on core strength has helped him, but he has had to stop due to time constraints.   Symptoms are relieved with sitting.  Symptoms are worsened by standing and walking.  He is not interested in spine surgery.  Epidural steroid injection was not helpful.      Review of Systems  Musculoskeletal: as above  Remainder of review of systems is negative including constitutional, eyes, ENT, CV, pulmonary, GI, , endocrine, skin, hematologic, and neurologic except as noted in HPI or medical history.    History reviewed. No pertinent past surgical/medical/family/social history other than as mentioned in HPI.    Patient Active Problem List   Diagnosis     Counseling regarding advanced directives     Essential hypertension     Arthropathy of shoulder right     Osteoarthritis of spine with radiculopathy, lumbosacral region     Gastroesophageal reflux disease without esophagitis     Spinal stenosis of lumbosacral region     Past Medical History:   Diagnosis Date     Hypertension      NO ACTIVE PROBLEMS      Past Surgical History:   Procedure Laterality Date     AMPUTATION FINGER/THUMB       NO HISTORY OF SURGERY       Family History   Problem Relation Age of Onset     Coronary Artery Disease Mother 55     Hypertension Mother      Other Cancer Father         lung cancer     Unknown/Adopted Maternal Grandmother      Unknown/Adopted  Maternal Grandfather      Unknown/Adopted Paternal Grandmother      Unknown/Adopted Paternal Grandfather      Substance Abuse Brother         alcoholism     Other Cancer Sister         throat cancer     Seizure Disorder Brother      Hypertension Sister      Social History     Socioeconomic History     Marital status:      Spouse name: Not on file     Number of children: Not on file     Years of education: Not on file     Highest education level: Not on file   Occupational History     Not on file   Social Needs     Financial resource strain: Not on file     Food insecurity:     Worry: Not on file     Inability: Not on file     Transportation needs:     Medical: Not on file     Non-medical: Not on file   Tobacco Use     Smoking status: Former Smoker     Last attempt to quit: 1978     Years since quittin.7     Smokeless tobacco: Never Used     Tobacco comment: former smoker   Substance and Sexual Activity     Alcohol use: No     Alcohol/week: 0.0 oz     Drug use: No     Sexual activity: Not Currently     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   Lifestyle     Physical activity:     Days per week: Not on file     Minutes per session: Not on file     Stress: Not on file   Relationships     Social connections:     Talks on phone: Not on file     Gets together: Not on file     Attends Taoism service: Not on file     Active member of club or organization: Not on file     Attends meetings of clubs or organizations: Not on file     Relationship status: Not on file     Intimate partner violence:     Fear of current or ex partner: Not on file     Emotionally abused: Not on file     Physically abused: Not on file     Forced sexual activity: Not on file   Other Topics Concern     Parent/sibling w/ CABG, MI or angioplasty before 65F 55M? Not Asked   Social History Narrative     Not on file       He works as a     Current Outpatient Medications   Medication     Ascorbic Acid (VITAMIN C  "PO)     atorvastatin (LIPITOR) 20 MG tablet     B Complex-C (VITAMIN B COMPLEX W/VITAMIN C) TABS tablet     calcium carbonate (OS-QUIANA 500 MG [place]. [place]) 1250 MG tablet     cinnamon 500 MG TABS     Ferrous Sulfate (IRON SUPPLEMENT PO)     guaiFENesin (MUCINEX) 600 MG 12 hr tablet     metoprolol tartrate (LOPRESSOR) 25 MG tablet     MULTIPLE VITAMIN PO     NAPROXEN PO     omega 3 1000 MG CAPS     omeprazole (PRILOSEC) 20 MG DR capsule     Probiotic Product (PROBIOTIC DAILY PO)     gabapentin (NEURONTIN) 100 MG capsule     nitroGLYcerin (NITROSTAT) 0.4 MG sublingual tablet     No current facility-administered medications for this visit.      No Known Allergies      Objective:  /72   Ht 1.803 m (5' 11\")   Wt 90.7 kg (200 lb)   BMI 27.89 kg/m      General: Alert and in no distress    Head: Normocephalic, atraumatic  Eyes: no scleral icterus or conjunctival erythema   Oropharynx:  Mucous membranes moist  Skin: no erythema, petechiae, or jaundice  CV: regular rhythm by palpation, 2+ distal pulses  Resp: normal respiratory effort without conversational dyspnea   Psych: normal mood and affect    Gait: Non-antalgic, appropriate coordination and balance   Neuro: Motor strength and sensation as noted below    Musculoskeletal:  -Foot:  No erythema or ecchymosis.  Tender over the medial dorsal foot.      Hip and Low back exam:    Inspection:     no visible deformity in the low back       normal skin       normal vascular       normal lymphatic       no asymmetry    Palpation:  Tender over the lumbar spine    Lumbar ROM: Full lumbar flexion, extension, rotation, and lateral flexion.  Reports stiffness with forward flexion.  Extension and right lateral flexion cause pain.      Hip ROM:  Right hip ROM causes anterior right hip pain.      Strength:  5/5 hip flexion/abduction/adduction, knee flexion/extension, ankle dorsiflexion/plantarflexion, great toe extension, toe flexion    Sensation: Altered sensation to light touch over " the left lateral leg and dorsal foot/toes.    Radiology:  X-rays ordered and independent visualization of images performed.  No acute osseous abnormality seen.   Full radiology report to follow.      MRI LUMBAR SPINE WITHOUT CONTRAST   7/13/2018 9:03 AM      HISTORY: Chronic bilateral low back pain, numbness in the left  leg/foot, bilateral sciatica.     TECHNIQUE: Multiplanar multisequence MRI of the lumbar spine without  contrast.     COMPARISON: 4/14/2016 MRI. Radiographs 6/5/2018.     FINDINGS: Previous radiographs show five lumbar-type vertebral bodies.   The distal spinal cord and cauda equina appear normal.     T12-L1:   Normal disc, facet joints, spinal canal and neural foramina.     L1-L2:  The left posterolateral inferiorly extruded disc herniation  seen previously has resolved. Moderate degenerative disc disease  remains with loss of disc height, circumferential disc bulge and  vertebral endplate osteophytes causing mild spinal canal stenosis.  Normal neural foramina and facet joints.     L2-L3:  Severe degenerative disc disease causing mild spinal canal  stenosis and mild bilateral foraminal stenosis. Normal facet joints.     L3-L4:  Severe degenerative disc disease. Severe spinal canal stenosis  from bulging disc, osteophytes and hypertrophied ligamentum flavum,  unchanged. Mild left foraminal stenosis and moderate foraminal  stenosis from bulging disc.     L4-L5:  Moderate degenerative disc disease, moderate spinal canal  stenosis, worse on the left than on the right, slightly decreased  since the previous exam. Bilateral moderate to severe foraminal  stenosis. Mild bilateral degenerative facet arthropathy.     L5-S1:  Mild circumferential disc bulge. Moderate right foraminal  stenosis and severe left foraminal stenosis. Normal spinal canal and  facet joints.     Paraspinous soft tissues:   Normal.       Bone marrow:   Normal.                                                                        IMPRESSION:    1. Interval resolution of L1-L2 inferiorly extruded disc herniation.  2. Multilevel degenerative disc disease and degenerative facet  arthropathy as described above.  3. Severe spinal canal stenosis at L3-L4, moderate spinal canal  stenosis L4-L5, and mild spinal canal stenosis L1-L2 and L2-L3.  Foraminal stenoses as described above. Slight improvement of spinal  canal stenosis at L4-L5 since previous exam.     DEXTER CEE MD    Assessment:  1. Chronic bilateral low back pain with bilateral sciatica    2. Lumbar degenerative disc disease    3. Hip pain, right    4. Foot pain, left        Plan:  Discussed the assessment with the patient and developed a plan together:  -Physical therapy ordered at the [facility].  Please do 5-6 days of exercises per week between formal sessions and the home exercises they provide.  Please call [phone] to schedule.    -Ice or heat 15-20 minutes as needed (Avoid sleeping on a heating pad or ice).  Recommend heat for 15-20 minutes before activity and ice for 15-20 minutes after activity.  -Patient's preferred over the counter medication as directed on packaging as needed for pain or soreness.    -Follow up as needed if symptoms fail to improve or worsen.  Please call with questions or concerns.        Fidelia Henderson MD, [facility]  [facility]       Clear bilaterally, pupils equal, round and reactive to light.

## 2021-02-02 NOTE — TELEPHONE ENCOUNTER
Results for orders placed or performed during the hospital encounter of 07/13/18   MRI Lumbar spine w/o contrast    Narrative    MRI LUMBAR SPINE WITHOUT CONTRAST   7/13/2018 9:03 AM     HISTORY: Chronic bilateral low back pain, numbness in the left  leg/foot, bilateral sciatica.    TECHNIQUE: Multiplanar multisequence MRI of the lumbar spine without  contrast.    COMPARISON: 4/14/2016 MRI. Radiographs 6/5/2018.    FINDINGS: Previous radiographs show five lumbar-type vertebral bodies.   The distal spinal cord and cauda equina appear normal.    T12-L1:   Normal disc, facet joints, spinal canal and neural foramina.       L1-L2:  The left posterolateral inferiorly extruded disc herniation  seen previously has resolved. Moderate degenerative disc disease  remains with loss of disc height, circumferential disc bulge and  vertebral endplate osteophytes causing mild spinal canal stenosis.  Normal neural foramina and facet joints.    L2-L3:  Severe degenerative disc disease causing mild spinal canal  stenosis and mild bilateral foraminal stenosis. Normal facet joints.    L3-L4:  Severe degenerative disc disease. Severe spinal canal stenosis  from bulging disc, osteophytes and hypertrophied ligamentum flavum,  unchanged. Mild left foraminal stenosis and moderate foraminal  stenosis from bulging disc.    L4-L5:  Moderate degenerative disc disease, moderate spinal canal  stenosis, worse on the left than on the right, slightly decreased  since the previous exam. Bilateral moderate to severe foraminal  stenosis. Mild bilateral degenerative facet arthropathy.    L5-S1:  Mild circumferential disc bulge. Moderate right foraminal  stenosis and severe left foraminal stenosis. Normal spinal canal and  facet joints.    Paraspinous soft tissues:   Normal.      Bone marrow:   Normal.       Impression    IMPRESSION:    1. Interval resolution of L1-L2 inferiorly extruded disc herniation.  2. Multilevel degenerative disc disease and  degenerative facet  arthropathy as described above.  3. Severe spinal canal stenosis at L3-L4, moderate spinal canal  stenosis L4-L5, and mild spinal canal stenosis L1-L2 and L2-L3.  Foraminal stenoses as described above. Slight improvement of spinal  canal stenosis at L4-L5 since previous exam.    DEXTER CEE MD        Alternatives Discussed Intro (Do Not Add Period): I discussed alternative treatments to Mohs surgery and specifically discussed the risks and benefits of

## 2021-09-26 NOTE — MR AVS SNAPSHOT
"              After Visit Summary   2/2/2017    Dion López    MRN: [id number]           Patient Information     Date Of Birth          1950        Visit Information        Provider Department      2/2/2017 10:30 AM Angi Garcia APRN CNP [facility]        Today's Diagnoses     Foot injury, right, initial encounter    -  1     Contusion of foot, right            Follow-ups after your visit        Future tests that were ordered for you today     Open Future Orders        Priority Expected Expires Ordered    XR Foot Right G/E 3 Views Routine 2/2/2017 2/2/2018 2/2/2017            Who to contact     If you have questions or need follow up information about today's clinic visit or your schedule please contact [facility] directly at [phone].  Normal or non-critical lab and imaging results will be communicated to you by MyChart, letter or phone within 4 business days after the clinic has received the results. If you do not hear from us within 7 days, please contact the clinic through [employer] or phone. If you have a critical or abnormal lab result, we will notify you by phone as soon as possible.  Submit refill requests through [employer] or call your pharmacy and they will forward the refill request to us. Please allow 3 business days for your refill to be completed.          Additional Information About Your Visit        MyChart Information     [employer] lets you send messages to your doctor, view your test results, renew your prescriptions, schedule appointments and more. To sign up, go to [email or web address] . Click on \"Log in\" on the left side of the screen, which will take you to the Welcome page. Then click on \"Sign up Now\" on the right side of the page.     You will be asked to enter the access code listed below, as well as some personal information. Please follow the directions to create your username and password.     Your access code is: [email or web address]  Expires: " SAFETY PLAN    A suicide Safety Plan is a document that supports someone when they are having thoughts of suicide. Warning Signs that indicate a suicidal crisis may be developing: What (situations, thoughts, feelings, body sensations, behaviors, etc.) do you experience that lets you know you are beginning to think about suicide? 1. angry  2. Complete isolation  3. Body twitches    Internal Coping Strategies:  What things can I do (relaxation techniques, hobbies, physical activities, etc.) to take my mind off my problems without contacting another person? 1. Sing write draw  2.  3.     People and social settings that provide distraction: Who can I call or where can I go to distract me? 1. Name: Katie Castaneda  Phone: her office  2. Name: Gee Ratliff  Phone: at the house   3. Place:             4. Place:     People whom I can ask for help: Who can I call when I need help - for example, friends, family, clergy, someone else? 1. Name: Liz Karol                Phone:   2. Name: [employer]  Phone:   3. Name: [employer]  Phone:     Professionals or [facility] agencies I can contact during a crisis: Who can I call for help - for example, my doctor, my psychiatrist, my psychologist, a mental health provider, a suicide hotline? 1. Clinician Name: Dr Priyanka Frazier   Phone:       Clinician Pager or Emergency Contact #:     2. Clinician Name: Donal Jean   Phone:       Clinician Pager or Emergency Contact #:     3. Suicide Prevention Lifeline: 7-001-966-TALK (5656)    4. [address] [place] [address] Emergency Services -  for example, [facility] suicide hotline, [facility] Hotline:       Emergency Services Address:       Emergency Services Phone:     Making the environment safe: How can I make my environment (house/apartment/living space) safer? For example, can I remove guns, medications, and other items? 1. Remove scissors  2.  Remove razors "5/3/2017  2:01 PM     Your access code will  in 90 days. If you need help or a new code, please call your [facility] or [phone].        Care EveryWhere ID     This is your Care EveryWhere ID. This could be used by other organizations to access your [place] medical records  [id number]        Your Vitals Were     Pulse Temperature Height BMI (Body Mass Index)          80 97.2  F (36.2  C) (Tympanic) 5' 11\" (1.803 m) 25.26 kg/m2         Blood Pressure from Last 3 Encounters:   17 130/76   10/14/16 114/72   16 128/80    Weight from Last 3 Encounters:   17 181 lb (82.101 kg)   10/14/16 188 lb (85.276 kg)   16 184 lb (83.462 kg)               Primary Care Provider Office Phone #    [facility] [phone]       No address on file        Thank you!     Thank you for choosing [facility]  for your care. Our goal is always to provide you with excellent care. Hearing back from our patients is one way we can continue to improve our services. Please take a few minutes to complete the written survey that you may receive in the mail after your visit with us. Thank you!             Your Updated Medication List - Protect others around you: Learn how to safely use, store and throw away your medicines at www.disposemymeds.org.          This list is accurate as of: 17  2:01 PM.  Always use your most recent med list.                   Brand Name Dispense Instructions for use    cinnamon 500 MG Tabs          NAPROXEN PO      Take 250 mg by mouth 2 times daily (with meals)       omega 3 1000 MG Caps     90 capsule    Take 1 g by mouth daily       RANITIDINE HCL PO      Take 150 mg by mouth daily         "

## 2022-05-09 NOTE — NURSING NOTE
"Chief Complaint   Patient presents with     Hospital F/U       Initial /78 (BP Location: Left arm, Patient Position: Chair, Cuff Size: Adult Regular)  Pulse 68  Temp 97.2  F (36.2  C) (Temporal)  Resp 16  Wt 184 lb (83.5 kg)  SpO2 95%  BMI 25.66 kg/m2 Estimated body mass index is 25.66 kg/(m^2) as calculated from the following:    Height as of 2/2/17: 5' 11\" (1.803 m).    Weight as of this encounter: 184 lb (83.5 kg).  Medication Reconciliation: complete     Health Maintenance Due   Topic Date Due     TETANUS IMMUNIZATION (SYSTEM ASSIGNED)  09/06/1967     HEPATITIS C SCREENING  09/06/1967     LIPID SCREEN Q5 YR MALE (SYSTEM ASSIGNED)  09/06/1984     COLON CANCER SCREEN (SYSTEM ASSIGNED)  09/06/1999     PNEUMOCOCCAL (1 of 2 - PCV13) 09/06/2014     INFLUENZA VACCINE (SYSTEM ASSIGNED)  09/01/2017     FALL RISK ASSESSMENT  10/13/2017     Wendy Bourgeois CMA      " ----- Message from Carrie Hogan R.N. sent at 5/9/2022  9:15 AM PDT -----  Please notify pt of WNL result

## 2023-08-21 NOTE — TELEPHONE ENCOUNTER
Called patient and left a voicemail to call the clinic back, when he calls back please ask him if he is having any symptoms.    Marie Kaufman, CMA    [employer]

## (undated) RX ORDER — FENTANYL CITRATE 50 UG/ML
INJECTION, SOLUTION INTRAMUSCULAR; INTRAVENOUS
Status: DISPENSED
Start: 2017-10-20

## (undated) RX ORDER — HEPARIN SODIUM 1000 [USP'U]/ML
INJECTION, SOLUTION INTRAVENOUS; SUBCUTANEOUS
Status: DISPENSED
Start: 2017-10-20

## (undated) RX ORDER — VERAPAMIL HYDROCHLORIDE 2.5 MG/ML
INJECTION, SOLUTION INTRAVENOUS
Status: DISPENSED
Start: 2017-10-20

## (undated) RX ORDER — LIDOCAINE HYDROCHLORIDE 10 MG/ML
INJECTION, SOLUTION EPIDURAL; INFILTRATION; INTRACAUDAL; PERINEURAL
Status: DISPENSED
Start: 2017-10-20

## (undated) RX ORDER — NITROGLYCERIN 5 MG/ML
VIAL (ML) INTRAVENOUS
Status: DISPENSED
Start: 2017-10-20

## (undated) RX ORDER — LIDOCAINE HYDROCHLORIDE 20 MG/ML
INJECTION, SOLUTION EPIDURAL; INFILTRATION; INTRACAUDAL; PERINEURAL
Status: DISPENSED
Start: 2017-10-20